# Patient Record
Sex: FEMALE | Race: WHITE | NOT HISPANIC OR LATINO
[De-identification: names, ages, dates, MRNs, and addresses within clinical notes are randomized per-mention and may not be internally consistent; named-entity substitution may affect disease eponyms.]

---

## 2017-04-12 ENCOUNTER — MEDICATION RENEWAL (OUTPATIENT)
Age: 82
End: 2017-04-12

## 2017-04-27 ENCOUNTER — APPOINTMENT (OUTPATIENT)
Dept: INTERNAL MEDICINE | Facility: CLINIC | Age: 82
End: 2017-04-27

## 2017-04-28 ENCOUNTER — INPATIENT (INPATIENT)
Facility: HOSPITAL | Age: 82
LOS: 2 days | Discharge: HOME | DRG: 690 | End: 2017-05-01
Attending: FAMILY MEDICINE | Admitting: HOSPITALIST
Payer: MEDICARE

## 2017-04-28 ENCOUNTER — APPOINTMENT (OUTPATIENT)
Dept: INTERNAL MEDICINE | Facility: CLINIC | Age: 82
End: 2017-04-28

## 2017-04-28 VITALS
OXYGEN SATURATION: 100 % | DIASTOLIC BLOOD PRESSURE: 68 MMHG | RESPIRATION RATE: 16 BRPM | WEIGHT: 119.93 LBS | TEMPERATURE: 97 F | SYSTOLIC BLOOD PRESSURE: 120 MMHG | HEART RATE: 88 BPM

## 2017-04-28 LAB
ALBUMIN SERPL ELPH-MCNC: 3.4 G/DL — SIGNIFICANT CHANGE UP (ref 3.3–5)
ALP SERPL-CCNC: 94 U/L — SIGNIFICANT CHANGE UP (ref 40–120)
ALT FLD-CCNC: 17 U/L — SIGNIFICANT CHANGE UP (ref 12–78)
AMYLASE P1 CFR SERPL: 71 U/L — SIGNIFICANT CHANGE UP (ref 25–115)
ANION GAP SERPL CALC-SCNC: 11 MMOL/L — SIGNIFICANT CHANGE UP (ref 5–17)
APPEARANCE UR: ABNORMAL
AST SERPL-CCNC: 19 U/L — SIGNIFICANT CHANGE UP (ref 15–37)
BASOPHILS # BLD AUTO: 0.1 K/UL — SIGNIFICANT CHANGE UP (ref 0–0.2)
BASOPHILS NFR BLD AUTO: 0.9 % — SIGNIFICANT CHANGE UP (ref 0–2)
BILIRUB SERPL-MCNC: 0.6 MG/DL — SIGNIFICANT CHANGE UP (ref 0.2–1.2)
BILIRUB UR-MCNC: ABNORMAL
BUN SERPL-MCNC: 44 MG/DL — HIGH (ref 7–23)
CALCIUM SERPL-MCNC: 9.3 MG/DL — SIGNIFICANT CHANGE UP (ref 8.5–10.1)
CHLORIDE SERPL-SCNC: 100 MMOL/L — SIGNIFICANT CHANGE UP (ref 96–108)
CO2 SERPL-SCNC: 23 MMOL/L — SIGNIFICANT CHANGE UP (ref 22–31)
COLOR SPEC: YELLOW — SIGNIFICANT CHANGE UP
CREAT SERPL-MCNC: 2.6 MG/DL — HIGH (ref 0.5–1.3)
DIFF PNL FLD: ABNORMAL
EOSINOPHIL # BLD AUTO: 0 K/UL — SIGNIFICANT CHANGE UP (ref 0–0.5)
EOSINOPHIL NFR BLD AUTO: 0.3 % — SIGNIFICANT CHANGE UP (ref 0–6)
GLUCOSE SERPL-MCNC: 117 MG/DL — HIGH (ref 70–99)
GLUCOSE UR QL: NEGATIVE — SIGNIFICANT CHANGE UP
HCT VFR BLD CALC: 45.7 % — HIGH (ref 34.5–45)
HGB BLD-MCNC: 14.8 G/DL — SIGNIFICANT CHANGE UP (ref 11.5–15.5)
KETONES UR-MCNC: ABNORMAL
LEUKOCYTE ESTERASE UR-ACNC: ABNORMAL
LIDOCAIN IGE QN: 220 U/L — SIGNIFICANT CHANGE UP (ref 73–393)
LYMPHOCYTES # BLD AUTO: 2.1 K/UL — SIGNIFICANT CHANGE UP (ref 1–3.3)
LYMPHOCYTES # BLD AUTO: 28.4 % — SIGNIFICANT CHANGE UP (ref 13–44)
MCHC RBC-ENTMCNC: 31.5 PG — SIGNIFICANT CHANGE UP (ref 27–34)
MCHC RBC-ENTMCNC: 32.4 GM/DL — SIGNIFICANT CHANGE UP (ref 32–36)
MCV RBC AUTO: 97.5 FL — SIGNIFICANT CHANGE UP (ref 80–100)
MONOCYTES # BLD AUTO: 0.6 K/UL — SIGNIFICANT CHANGE UP (ref 0–0.9)
MONOCYTES NFR BLD AUTO: 8.4 % — SIGNIFICANT CHANGE UP (ref 1–9)
NEUTROPHILS # BLD AUTO: 4.6 K/UL — SIGNIFICANT CHANGE UP (ref 1.8–7.4)
NEUTROPHILS NFR BLD AUTO: 62 % — SIGNIFICANT CHANGE UP (ref 43–77)
NITRITE UR-MCNC: NEGATIVE — SIGNIFICANT CHANGE UP
PH UR: 6.5 — SIGNIFICANT CHANGE UP (ref 5–8)
PLATELET # BLD AUTO: 197 K/UL — SIGNIFICANT CHANGE UP (ref 150–400)
POTASSIUM SERPL-MCNC: 5.8 MMOL/L — HIGH (ref 3.5–5.3)
POTASSIUM SERPL-SCNC: 5.8 MMOL/L — HIGH (ref 3.5–5.3)
PROT SERPL-MCNC: 6.8 G/DL — SIGNIFICANT CHANGE UP (ref 6–8.3)
PROT UR-MCNC: 25 MG/DL
RBC # BLD: 4.69 M/UL — SIGNIFICANT CHANGE UP (ref 3.8–5.2)
RBC # FLD: 12.7 % — SIGNIFICANT CHANGE UP (ref 10.3–14.5)
SODIUM SERPL-SCNC: 134 MMOL/L — LOW (ref 135–145)
SP GR SPEC: 1.01 — SIGNIFICANT CHANGE UP (ref 1.01–1.02)
TSH SERPL-MCNC: 2.22 UIU/ML — SIGNIFICANT CHANGE UP (ref 0.36–3.74)
UROBILINOGEN FLD QL: NEGATIVE — SIGNIFICANT CHANGE UP
WBC # BLD: 7.5 K/UL — SIGNIFICANT CHANGE UP (ref 3.8–10.5)
WBC # FLD AUTO: 7.5 K/UL — SIGNIFICANT CHANGE UP (ref 3.8–10.5)

## 2017-04-28 PROCEDURE — 72125 CT NECK SPINE W/O DYE: CPT | Mod: 26

## 2017-04-28 PROCEDURE — 70450 CT HEAD/BRAIN W/O DYE: CPT | Mod: 26

## 2017-04-28 PROCEDURE — 72131 CT LUMBAR SPINE W/O DYE: CPT | Mod: 26

## 2017-04-28 PROCEDURE — 72128 CT CHEST SPINE W/O DYE: CPT | Mod: 26

## 2017-04-28 RX ORDER — SODIUM CHLORIDE 9 MG/ML
3 INJECTION INTRAMUSCULAR; INTRAVENOUS; SUBCUTANEOUS ONCE
Qty: 0 | Refills: 0 | Status: COMPLETED | OUTPATIENT
Start: 2017-04-28 | End: 2017-04-28

## 2017-04-28 RX ORDER — SODIUM CHLORIDE 9 MG/ML
1000 INJECTION INTRAMUSCULAR; INTRAVENOUS; SUBCUTANEOUS ONCE
Qty: 0 | Refills: 0 | Status: COMPLETED | OUTPATIENT
Start: 2017-04-28 | End: 2017-04-28

## 2017-04-28 RX ORDER — CEFTRIAXONE 500 MG/1
1 INJECTION, POWDER, FOR SOLUTION INTRAMUSCULAR; INTRAVENOUS ONCE
Qty: 0 | Refills: 0 | Status: COMPLETED | OUTPATIENT
Start: 2017-04-28 | End: 2017-04-28

## 2017-04-28 RX ADMIN — SODIUM CHLORIDE 1000 MILLILITER(S): 9 INJECTION INTRAMUSCULAR; INTRAVENOUS; SUBCUTANEOUS at 23:14

## 2017-04-28 RX ADMIN — SODIUM CHLORIDE 3 MILLILITER(S): 9 INJECTION INTRAMUSCULAR; INTRAVENOUS; SUBCUTANEOUS at 21:08

## 2017-04-28 NOTE — ED ADULT NURSE NOTE - OBJECTIVE STATEMENT
Pt alert, Disoriented, labs drawn and sent, brought to ED by family for ams, advised on plan of care/hand hygiene, verbalized understanding, awaiting dispo.

## 2017-04-28 NOTE — ED ADULT TRIAGE NOTE - CHIEF COMPLAINT QUOTE
pt fell one week ago and c/o lower back pain  pt from PMD's office MD requests CT to r/o bleed, blood work to check Na+. and was hypotensive at office 90/60 wants a CBC pt fell one week ago and c/o lower back pain  pt from PMD's office MD requests CT to r/o bleed, blood work to check Na+. and was hypotensive at office 90/60 wants a CBC pt able to ambulate without difficulty, no contusions noted, pt normotensive in triage

## 2017-04-28 NOTE — ED ADULT NURSE NOTE - PMH
Arthritis    Damian esophagus    GERD (gastroesophageal reflux disease)    HTN - Hypertension    Hypercholesterolemia    Low sodium levels    TIA (transient ischemic attack)

## 2017-04-28 NOTE — ED ADULT NURSE NOTE - CHIEF COMPLAINT QUOTE
pt fell one week ago and c/o upper mid  back pain  pt from PMD's office MD requests CT to r/o bleed, blood work to check Na+. and was hypotensive at office 90/60 wants a CBC

## 2017-04-29 VITALS
OXYGEN SATURATION: 94 % | HEART RATE: 84 BPM | RESPIRATION RATE: 14 BRPM | DIASTOLIC BLOOD PRESSURE: 60 MMHG | SYSTOLIC BLOOD PRESSURE: 90 MMHG

## 2017-04-29 DIAGNOSIS — N39.0 URINARY TRACT INFECTION, SITE NOT SPECIFIED: ICD-10-CM

## 2017-04-29 DIAGNOSIS — R41.82 ALTERED MENTAL STATUS, UNSPECIFIED: ICD-10-CM

## 2017-04-29 DIAGNOSIS — I10 ESSENTIAL (PRIMARY) HYPERTENSION: ICD-10-CM

## 2017-04-29 DIAGNOSIS — Z29.9 ENCOUNTER FOR PROPHYLACTIC MEASURES, UNSPECIFIED: ICD-10-CM

## 2017-04-29 DIAGNOSIS — E03.9 HYPOTHYROIDISM, UNSPECIFIED: ICD-10-CM

## 2017-04-29 DIAGNOSIS — G45.9 TRANSIENT CEREBRAL ISCHEMIC ATTACK, UNSPECIFIED: ICD-10-CM

## 2017-04-29 DIAGNOSIS — N28.9 DISORDER OF KIDNEY AND URETER, UNSPECIFIED: ICD-10-CM

## 2017-04-29 LAB
ALBUMIN SERPL ELPH-MCNC: 3 G/DL — LOW (ref 3.3–5)
ALP SERPL-CCNC: 85 U/L — SIGNIFICANT CHANGE UP (ref 40–120)
ALT FLD-CCNC: 15 U/L — SIGNIFICANT CHANGE UP (ref 12–78)
ANION GAP SERPL CALC-SCNC: 9 MMOL/L — SIGNIFICANT CHANGE UP (ref 5–17)
AST SERPL-CCNC: 15 U/L — SIGNIFICANT CHANGE UP (ref 15–37)
BILIRUB SERPL-MCNC: 0.5 MG/DL — SIGNIFICANT CHANGE UP (ref 0.2–1.2)
BUN SERPL-MCNC: 41 MG/DL — HIGH (ref 7–23)
CALCIUM SERPL-MCNC: 8.6 MG/DL — SIGNIFICANT CHANGE UP (ref 8.5–10.1)
CHLORIDE SERPL-SCNC: 104 MMOL/L — SIGNIFICANT CHANGE UP (ref 96–108)
CO2 SERPL-SCNC: 25 MMOL/L — SIGNIFICANT CHANGE UP (ref 22–31)
CREAT SERPL-MCNC: 2.2 MG/DL — HIGH (ref 0.5–1.3)
GLUCOSE SERPL-MCNC: 121 MG/DL — HIGH (ref 70–99)
POTASSIUM SERPL-MCNC: 4.8 MMOL/L — SIGNIFICANT CHANGE UP (ref 3.5–5.3)
POTASSIUM SERPL-SCNC: 4.8 MMOL/L — SIGNIFICANT CHANGE UP (ref 3.5–5.3)
PROT SERPL-MCNC: 6.1 G/DL — SIGNIFICANT CHANGE UP (ref 6–8.3)
SODIUM SERPL-SCNC: 138 MMOL/L — SIGNIFICANT CHANGE UP (ref 135–145)

## 2017-04-29 PROCEDURE — 71010: CPT | Mod: 26

## 2017-04-29 PROCEDURE — 76775 US EXAM ABDO BACK WALL LIM: CPT | Mod: 26

## 2017-04-29 PROCEDURE — 93010 ELECTROCARDIOGRAM REPORT: CPT

## 2017-04-29 PROCEDURE — 99223 1ST HOSP IP/OBS HIGH 75: CPT | Mod: AI,GC

## 2017-04-29 PROCEDURE — 99285 EMERGENCY DEPT VISIT HI MDM: CPT

## 2017-04-29 RX ORDER — SODIUM CHLORIDE 9 MG/ML
1000 INJECTION INTRAMUSCULAR; INTRAVENOUS; SUBCUTANEOUS
Qty: 0 | Refills: 0 | Status: DISCONTINUED | OUTPATIENT
Start: 2017-04-29 | End: 2017-04-30

## 2017-04-29 RX ORDER — METOPROLOL TARTRATE 50 MG
12.5 TABLET ORAL DAILY
Qty: 0 | Refills: 0 | Status: DISCONTINUED | OUTPATIENT
Start: 2017-04-29 | End: 2017-05-01

## 2017-04-29 RX ORDER — FUROSEMIDE 40 MG
0 TABLET ORAL
Qty: 0 | Refills: 0 | COMMUNITY

## 2017-04-29 RX ORDER — LEVOTHYROXINE SODIUM 125 MCG
75 TABLET ORAL DAILY
Qty: 0 | Refills: 0 | Status: DISCONTINUED | OUTPATIENT
Start: 2017-04-29 | End: 2017-05-01

## 2017-04-29 RX ORDER — PANTOPRAZOLE SODIUM 20 MG/1
40 TABLET, DELAYED RELEASE ORAL DAILY
Qty: 0 | Refills: 0 | Status: DISCONTINUED | OUTPATIENT
Start: 2017-04-29 | End: 2017-05-01

## 2017-04-29 RX ORDER — LOSARTAN POTASSIUM 100 MG/1
0 TABLET, FILM COATED ORAL
Qty: 0 | Refills: 0 | COMMUNITY

## 2017-04-29 RX ORDER — EZETIMIBE 10 MG/1
0 TABLET ORAL
Qty: 0 | Refills: 0 | COMMUNITY

## 2017-04-29 RX ORDER — ACETAMINOPHEN 500 MG
650 TABLET ORAL EVERY 6 HOURS
Qty: 0 | Refills: 0 | Status: DISCONTINUED | OUTPATIENT
Start: 2017-04-29 | End: 2017-05-01

## 2017-04-29 RX ORDER — HEPARIN SODIUM 5000 [USP'U]/ML
5000 INJECTION INTRAVENOUS; SUBCUTANEOUS EVERY 12 HOURS
Qty: 0 | Refills: 0 | Status: DISCONTINUED | OUTPATIENT
Start: 2017-04-29 | End: 2017-05-01

## 2017-04-29 RX ORDER — LEVOTHYROXINE SODIUM 125 MCG
0 TABLET ORAL
Qty: 0 | Refills: 0 | COMMUNITY

## 2017-04-29 RX ORDER — CEFTRIAXONE 500 MG/1
1 INJECTION, POWDER, FOR SOLUTION INTRAMUSCULAR; INTRAVENOUS EVERY 24 HOURS
Qty: 0 | Refills: 0 | Status: DISCONTINUED | OUTPATIENT
Start: 2017-04-29 | End: 2017-04-30

## 2017-04-29 RX ORDER — SODIUM CHLORIDE 9 MG/ML
1000 INJECTION INTRAMUSCULAR; INTRAVENOUS; SUBCUTANEOUS
Qty: 0 | Refills: 0 | Status: DISCONTINUED | OUTPATIENT
Start: 2017-04-29 | End: 2017-04-29

## 2017-04-29 RX ADMIN — PANTOPRAZOLE SODIUM 40 MILLIGRAM(S): 20 TABLET, DELAYED RELEASE ORAL at 22:47

## 2017-04-29 RX ADMIN — HEPARIN SODIUM 5000 UNIT(S): 5000 INJECTION INTRAVENOUS; SUBCUTANEOUS at 18:23

## 2017-04-29 RX ADMIN — CEFTRIAXONE 100 GRAM(S): 500 INJECTION, POWDER, FOR SOLUTION INTRAMUSCULAR; INTRAVENOUS at 01:21

## 2017-04-29 RX ADMIN — SODIUM CHLORIDE 75 MILLILITER(S): 9 INJECTION INTRAMUSCULAR; INTRAVENOUS; SUBCUTANEOUS at 07:45

## 2017-04-29 RX ADMIN — HEPARIN SODIUM 5000 UNIT(S): 5000 INJECTION INTRAVENOUS; SUBCUTANEOUS at 07:45

## 2017-04-29 NOTE — PROGRESS NOTE ADULT - PROBLEM SELECTOR PLAN 3
-SARA with Cr improving with IVF (baseline Cr 0.9)  -Renal US to no obstruction  -Consulted Nephro Dr. Carranza-to see pt in AM-f/u recs

## 2017-04-29 NOTE — H&P ADULT - PROBLEM SELECTOR PLAN 3
-SARA with Cr improving with IVF (baseline Cr 0.9)  -f/u Renal US to r/o obstruction  -Consulted Nephro Dr. Carranza-to see pt in AM-f/u recs

## 2017-04-29 NOTE — ED PROVIDER NOTE - OBJECTIVE STATEMENT
Pt is a 89 yo female who presents to the ED with a cc of low back pain and increased confusion.  Pt reports that she suffered a mechanical fall approx 1 week ago.  The events surrounding the fall are unclear.  Daughter reports that pt suffers from chronic back pain.  Just prior to the fall her PMD had placed her on Tramadol.  She is unsure if this is what lead to the fall.  Pt reports that she does remember that she fell on her stone floor and struck her back and head.  Pt cannot recall why she fell.  She did not seek medical attention at the time.  Today they followed up with her PMD for continued back pain and increasing confusion.  In the office the pt BP was noted to be 90/60 per report and she was more confused then normal.  Pt was sent to the ED for further elv.  Does have a history of hyponatremia in the past.

## 2017-04-29 NOTE — H&P ADULT - PROBLEM SELECTOR PLAN 1
-possible likley 2/2 UTI  -CT head negative  -cont IVF NS @ 75cc/hour x 12 hours-reassess after for fluid status  -fall precautions  -f/u AM labs

## 2017-04-29 NOTE — PROGRESS NOTE ADULT - SUBJECTIVE AND OBJECTIVE BOX
Patient is a 89y old  Female who presents with a chief complaint of Change in mental status/uti/renal insufficiency (2017 08:09)      HPI:  This is a 89 y/o female with PMHx HTN, HLD, TIA, GERD, Barretts esophagus, arthritis p/w AMS, confusion s/p fall 1 week ago. Pt was sent by her PCP Dr. Mendoza who noticed she was obviously confused. History limited as patient is A+Ox3 but a poor historian. History obtained from ED attending Dr. George. Pt had a fall last week and was taken off certain pain meds such as tramadol and motrin. Per pt's daughter, pt was on the floor, saw she urinated on the floor and she is not normally incontinent. Pt denies seizure history. Pt denies LOC, HA, CP, SOB, abd pain or ext numb/tingling. Pt denies fevers/chills. Pt admits to dark urine. Pt denies hematuria/dysuria. Pt baseline Cr 0.9 per daughter , pt alverto sec to multi drug induce / dehydration , was on Lasix and  losartan in home.        03:05)      INTERVAL HPI/OVERNIGHT EVENTS:  T(C): 36.2, Max: 37.1 (- @ 00:16)  HR: 86 (77 - 88)  BP: 156/80 (120/68 - 156/80)  RR: 16 (16 - 16)  SpO2: 97% (97% - 100%)  Wt(kg): --  I&O's Summary    I & Os for current day (as of 2017 13:20)  =============================================  IN: 690 ml / OUT: 0 ml / NET: 690 ml      REVIEW OF SYSTEMS:  CONSTITUTIONAL: weakness   EYES: No eye pain,   ENMT:  difficult  hearing present ,   NECK: No pain or stiffness  RESPIRATORY: No cough, wheezing, chills or hemoptysis; No shortness of breath  CARDIOVASCULAR: No chest pain, palpitations, dizziness, or leg swelling  GASTROINTESTINAL: No abdominal or epigastric pain.  GENITOURINARY: No dysuria, frequency, hematuria,   NEUROLOGICAL: No headaches,   SKIN: No itching, burning, rashes, or lesions   LYMPH NODES: No enlarged glands  MUSCULOSKELETAL: back pain       PHYSICAL EXAM:  GENERAL: NAD, weakness present   HEAD:  Atraumatic, Normocephalic  EYES: EOMI, PERRLA,   ENMT:; Moist mucous membranes present  NECK: Supple,   NERVOUS SYSTEM:  Alert & Oriented X2 ,   CHEST/LUNG: Clear to percussion bilaterally; No rales, rhonchi,  HEART: Regular rate and rhythm , no tachy ;   ABDOMEN: Soft, Nontender, Nondistended; Bowel sounds present  EXTREMITIES:  2+ Peripheral Pulses, no tenderness   SKIN: No rashes or lesions    MEDICATIONS  (STANDING):  heparin  Injectable 5000Unit(s) SubCutaneous every 12 hours  sodium chloride 0.9%. 1000milliLiter(s) IV Continuous <Continuous>  cefTRIAXone   IVPB 1Gram(s) IV Intermittent every 24 hours    MEDICATIONS  (PRN):  acetaminophen   Tablet. 650milliGRAM(s) Oral every 6 hours PRN Mild Pain (1 - 3)      LABS:                        14.8   7.5   )-----------( 197      ( 2017 21:28 )             45.7         138  |  104  |  41<H>  ----------------------------<  121<H>  4.8   |  25  |  2.20<H>    Ca    8.6      2017 02:27    TPro  6.1  /  Alb  3.0<L>  /  TBili  0.5  /  DBili  x   /  AST  15  /  ALT  15  /  AlkPhos  85        Urinalysis Basic - ( 2017 23:39 )    Color: Yellow / Appearance: Slightly Turbid / S.010 / pH: x  Gluc: x / Ketone: Trace  / Bili: Small / Urobili: Negative   Blood: x / Protein: 25 mg/dL / Nitrite: Negative   Leuk Esterase: Moderate / RBC: 0-2 /HPF / WBC >50   Sq Epi: x / Non Sq Epi: Few / Bacteria: TNTC              RADIOLOGY & ADDITIONAL TESTS:    Imaging Personally Reviewed: xray chest no infiltrate , renal sono no hydronephrosis .      Advance Directives: full code

## 2017-04-29 NOTE — PROGRESS NOTE ADULT - ASSESSMENT
This is a 89 y/o female with PMHx HTN, HLD, TIA, GERD, Barretts esophagus, arthritis p/w AMS, confusion s/p fall 1 week ago a/w AMS, UTI and SARA.

## 2017-04-29 NOTE — H&P ADULT - NSHPLABSRESULTS_GEN_ALL_CORE
14.8   7.5   )-----------( 197      ( 2017 21:28 )             45.7     2017 02:27    138    |  104    |  41     ----------------------------<  121    4.8     |  25     |  2.20     Ca    8.6        2017 02:27    TPro  6.1    /  Alb  3.0    /  TBili  0.5    /  DBili  x      /  AST  15     /  ALT  15     /  AlkPhos  85     2017 02:27    LIVER FUNCTIONS - ( 2017 02:27 )  Alb: 3.0 g/dL / Pro: 6.1 g/dL / ALK PHOS: 85 U/L / ALT: 15 U/L / AST: 15 U/L / GGT: x             CAPILLARY BLOOD GLUCOSE        Urinalysis Basic - ( 2017 23:39 )    Color: Yellow / Appearance: Slightly Turbid / S.010 / pH: x  Gluc: x / Ketone: Trace  / Bili: Small / Urobili: Negative   Blood: x / Protein: 25 mg/dL / Nitrite: Negative   Leuk Esterase: Moderate / RBC: 0-2 /HPF / WBC >50   Sq Epi: x / Non Sq Epi: Few / Bacteria: TNTC

## 2017-04-29 NOTE — H&P ADULT - ASSESSMENT
89yo F with PMHx HTN, HLD, TIA, GERD, Barretts esophagus, arthritis presented to ED This is a 89 y/o female with PMHx HTN, HLD, TIA, GERD, Barretts esophagus, arthritis p/w AMS, confusion s/p fall 1 week ago a/w AMS, UTI and SARA.

## 2017-04-29 NOTE — H&P ADULT - HISTORY OF PRESENT ILLNESS
89yo F with PMHx HTN, HLD, TIA, GERD, Barretts esophagus, arthritis presented to ED       In the ED patient received 1L NS, 1 dose rocephin. CBC, CMP, lipase, amylase, TSH, UA urine culture, renal US, CT head/c-spine, CT thoracic spine, CT lumbar spine, CXR and EKG ordered.   Significant for Cr 2.2  UA: moderate leuk esterase    Imaging:  CT Head/Cervical Spine: Impression: Head CT: Motion degraded study. No acute intracranial hemorrhage, extra axial fluid collection or displaced skull fracture, given the extent of artifact. If clinically indicated and when patient is able to cooperate, a short-term follow-up or an MRI may be obtained for further evaluation.     Cervical spine CT: Motion degraded study. Diffuse osteopenia without obvious fracture or traumatic malalignment in the cervical spine, given the extent of artifact. If clinically indicated, an MRI may be obtained for further evaluation. Multilevel degenerative changes of the cervical spine.  Heterogeneous thyroid gland, which can be further characterized on a nonemergent ultrasound.    CT Thoracic/Lumbar Spine: Impression: Osteopenia. Stable age-indeterminate T8 superior endplate compression fracture. No new fracture or traumatic malalignment in the thoracic or lumbar spine. If there is continued clinical suspicion for acute fracture, an MRI may be obtained for further evaluation.     CXR:     Renal US pending.    Vitals in ED:   /78 HR 77 T 98.7 RR 16 O2 Sat 100%    Patient admitted to [facility]. This is a 89 y/o female with PMHx HTN, HLD, TIA, GERD, Barretts esophagus, arthritis p/w AMS, confusion s/p fall 1 week ago. Pt was sent by her PCP Dr. Mendoza who noticed she was obviously confused. History limited as patient is A+Ox3 but a poor historian. History obtained from ED attending Dr. George. Pt had a fall last week and was taken off certain pain meds such as tramadol and motrin. Per pt's daughter, pt was on the floor, saw she urinated on the floor and she is not normally incontinent. Pt denies seizure history. Pt denies LOC, HA, CP, SOB, abd pain or ext numb/tingling. Pt denies fevers/chills. Pt admits to dark urine. Pt denies hematuria/dysuria. Pt baseline Cr 0.9 per daughter. Unable to get a medication list from patient.       In the ED, VSS. Labs significant for initial Cr of 2.6, K+ 5.8, and UA+. In the ED, patient received 1L NS, Rocephin 1g IV x1. Renal US pending. CT head negative. CT cervical, thoracic, lumbar spine negative for acute fx.    Imaging:  CT Head/Cervical Spine: Impression: Head CT: Motion degraded study. No acute intracranial hemorrhage, extra axial fluid collection or displaced skull fracture, given the extent of artifact. If clinically indicated and when patient is able to cooperate, a short-term follow-up or an MRI may be obtained for further evaluation.     Cervical spine CT: Motion degraded study. Diffuse osteopenia without obvious fracture or traumatic malalignment in the cervical spine, given the extent of artifact. If clinically indicated, an MRI may be obtained for further evaluation. Multilevel degenerative changes of the cervical spine.  Heterogeneous thyroid gland, which can be further characterized on a nonemergent ultrasound.    CT Thoracic/Lumbar Spine: Impression: Osteopenia. Stable age-indeterminate T8 superior endplate compression fracture. No new fracture or traumatic malalignment in the thoracic or lumbar spine. If there is continued clinical suspicion for acute fracture, an MRI may be obtained for further evaluation.

## 2017-04-29 NOTE — ED PROVIDER NOTE - CONDUCTED A DETAILED DISCUSSION WITH PATIENT AND/OR GUARDIAN REGARDING, MDM
return to ED if symptoms worsen, persist or questions arise/need to admit/lab results/radiology results

## 2017-04-29 NOTE — ED PROVIDER NOTE - CARE PLAN
Principal Discharge DX:	Change in mental status  Instructions for follow-up, activity and diet:	admit  Secondary Diagnosis:	UTI (urinary tract infection)  Secondary Diagnosis:	Renal insufficiency

## 2017-04-29 NOTE — ED PROVIDER NOTE - MEDICAL DECISION MAKING DETAILS
cbc, cmp, amylase, lipase, TSH, UA, urine culture, EKG, chest x-ray, CT head, cervical spine, thoracic spine, and lumbar spine, observation

## 2017-04-30 DIAGNOSIS — I10 ESSENTIAL (PRIMARY) HYPERTENSION: ICD-10-CM

## 2017-04-30 DIAGNOSIS — N17.9 ACUTE KIDNEY FAILURE, UNSPECIFIED: ICD-10-CM

## 2017-04-30 DIAGNOSIS — Z71.89 OTHER SPECIFIED COUNSELING: ICD-10-CM

## 2017-04-30 DIAGNOSIS — N39.0 URINARY TRACT INFECTION, SITE NOT SPECIFIED: ICD-10-CM

## 2017-04-30 LAB
ANION GAP SERPL CALC-SCNC: 9 MMOL/L — SIGNIFICANT CHANGE UP (ref 5–17)
BASOPHILS # BLD AUTO: 0.1 K/UL — SIGNIFICANT CHANGE UP (ref 0–0.2)
BASOPHILS NFR BLD AUTO: 1.4 % — SIGNIFICANT CHANGE UP (ref 0–2)
BUN SERPL-MCNC: 26 MG/DL — HIGH (ref 7–23)
CALCIUM SERPL-MCNC: 9.2 MG/DL — SIGNIFICANT CHANGE UP (ref 8.5–10.1)
CHLORIDE SERPL-SCNC: 101 MMOL/L — SIGNIFICANT CHANGE UP (ref 96–108)
CO2 SERPL-SCNC: 24 MMOL/L — SIGNIFICANT CHANGE UP (ref 22–31)
CREAT SERPL-MCNC: 1.5 MG/DL — HIGH (ref 0.5–1.3)
EOSINOPHIL # BLD AUTO: 0.1 K/UL — SIGNIFICANT CHANGE UP (ref 0–0.5)
EOSINOPHIL NFR BLD AUTO: 1.1 % — SIGNIFICANT CHANGE UP (ref 0–6)
GLUCOSE SERPL-MCNC: 111 MG/DL — HIGH (ref 70–99)
HCT VFR BLD CALC: 47 % — HIGH (ref 34.5–45)
HGB BLD-MCNC: 14.7 G/DL — SIGNIFICANT CHANGE UP (ref 11.5–15.5)
LYMPHOCYTES # BLD AUTO: 1.6 K/UL — SIGNIFICANT CHANGE UP (ref 1–3.3)
LYMPHOCYTES # BLD AUTO: 28.3 % — SIGNIFICANT CHANGE UP (ref 13–44)
MCHC RBC-ENTMCNC: 30.4 PG — SIGNIFICANT CHANGE UP (ref 27–34)
MCHC RBC-ENTMCNC: 31.3 GM/DL — LOW (ref 32–36)
MCV RBC AUTO: 97 FL — SIGNIFICANT CHANGE UP (ref 80–100)
MONOCYTES # BLD AUTO: 0.5 K/UL — SIGNIFICANT CHANGE UP (ref 0–0.9)
MONOCYTES NFR BLD AUTO: 8.2 % — SIGNIFICANT CHANGE UP (ref 1–9)
NEUTROPHILS # BLD AUTO: 3.5 K/UL — SIGNIFICANT CHANGE UP (ref 1.8–7.4)
NEUTROPHILS NFR BLD AUTO: 61 % — SIGNIFICANT CHANGE UP (ref 43–77)
PLATELET # BLD AUTO: 197 K/UL — SIGNIFICANT CHANGE UP (ref 150–400)
POTASSIUM SERPL-MCNC: 5.1 MMOL/L — SIGNIFICANT CHANGE UP (ref 3.5–5.3)
POTASSIUM SERPL-SCNC: 5.1 MMOL/L — SIGNIFICANT CHANGE UP (ref 3.5–5.3)
RBC # BLD: 4.84 M/UL — SIGNIFICANT CHANGE UP (ref 3.8–5.2)
RBC # FLD: 12.3 % — SIGNIFICANT CHANGE UP (ref 10.3–14.5)
SODIUM SERPL-SCNC: 134 MMOL/L — LOW (ref 135–145)
WBC # BLD: 5.8 K/UL — SIGNIFICANT CHANGE UP (ref 3.8–10.5)
WBC # FLD AUTO: 5.8 K/UL — SIGNIFICANT CHANGE UP (ref 3.8–10.5)

## 2017-04-30 PROCEDURE — 99233 SBSQ HOSP IP/OBS HIGH 50: CPT | Mod: GC

## 2017-04-30 RX ORDER — CIPROFLOXACIN LACTATE 400MG/40ML
500 VIAL (ML) INTRAVENOUS ONCE
Qty: 0 | Refills: 0 | Status: COMPLETED | OUTPATIENT
Start: 2017-04-30 | End: 2017-04-30

## 2017-04-30 RX ADMIN — CEFTRIAXONE 100 GRAM(S): 500 INJECTION, POWDER, FOR SOLUTION INTRAMUSCULAR; INTRAVENOUS at 00:44

## 2017-04-30 RX ADMIN — Medication 75 MICROGRAM(S): at 05:29

## 2017-04-30 RX ADMIN — Medication 500 MILLIGRAM(S): at 22:59

## 2017-04-30 RX ADMIN — HEPARIN SODIUM 5000 UNIT(S): 5000 INJECTION INTRAVENOUS; SUBCUTANEOUS at 05:29

## 2017-04-30 RX ADMIN — Medication 12.5 MILLIGRAM(S): at 05:29

## 2017-04-30 RX ADMIN — SODIUM CHLORIDE 65 MILLILITER(S): 9 INJECTION INTRAMUSCULAR; INTRAVENOUS; SUBCUTANEOUS at 00:44

## 2017-04-30 RX ADMIN — PANTOPRAZOLE SODIUM 40 MILLIGRAM(S): 20 TABLET, DELAYED RELEASE ORAL at 12:51

## 2017-04-30 RX ADMIN — HEPARIN SODIUM 5000 UNIT(S): 5000 INJECTION INTRAVENOUS; SUBCUTANEOUS at 17:47

## 2017-04-30 NOTE — PROGRESS NOTE ADULT - ASSESSMENT
This is a 89 y/o female with PMHx HTN, HLD, TIA, GERD, Barretts esophagus, arthritis p/w AMS, confusion s/p fall 1 week ago a/w AMS, UTI and SARA / dehydration  and meds induce .

## 2017-04-30 NOTE — PROGRESS NOTE ADULT - SUBJECTIVE AND OBJECTIVE BOX
cc/o weakness     HPI:  This is a 89 y/o female with PMHx HTN, HLD, TIA, GERD, Barretts esophagus, arthritis p/w AMS, confusion s/p fall 1 week ago. Pt was sent by her PCP Dr. Mendoza who noticed she was obviously confused. . . Pt admits dehydrated / alverto   meds induce and sec low po intake ,  with  uti cause of ams possible this time .     INTERVAL HPI/OVERNIGHT EVENTS:  T(C): 36.9, Max: 36.9 (04-30 @ 14:17)  HR: 66 (66 - 79)  BP: 165/80 (160/80 - 187/93)  RR: 17 (16 - 17)  SpO2: 99% (95% - 99%)  Wt(kg): --  I&O's Summary  I & Os for 24h ending 2017 07:00  =============================================  IN: 1980 ml / OUT: 0 ml / NET: 1980 ml    I & Os for current day (as of 2017 15:06)  =============================================  IN: 240 ml / OUT: 0 ml / NET: 240 ml      REVIEW OF SYSTEMS:  CONSTITUTIONAL: No fever, present  fatigue  EYES: No eye pain,   ENMT:  No difficulty hearing,  NECK: No pain or stiffness  BREASTS: No pain,   RESPIRATORY: No cough, wheezing, chills or hemoptysis; No shortness of breath  CARDIOVASCULAR: No chest pain, palpitations, dizziness, or leg swelling  GASTROINTESTINAL: No abdominal or epigastric pain. No nausea, vomiting  GENITOURINARY: No dysuria, frequency, hematuria, or incontinence  NEUROLOGICAL: No headaches,   SKIN: No itching, burning, rashes, or lesions   LYMPH NODES: No enlarged glands  MUSCULOSKELETAL: No joint pain or swelling; No muscle, back, or extremity pain      PHYSICAL EXAM:  GENERAL: NAD, weakness   HEAD:  Atraumatic, Normocephalic  EYES: EOMI, PERRLA,  ENMT:  Moist mucous membranes,   NECK: Supple, No JVD,   NERVOUS SYSTEM:  Alert & Oriented X2 , unsteady gait   CHEST/LUNG: Clear to percussion bilaterally; No rales, rhonchi, wheezing, or rubs  HEART: Regular rate and rhythm; No murmurs,  ABDOMEN: Soft, Nontender, Nondistended; Bowel sounds present  EXTREMITIES:  2+ Peripheral Pulses, No clubbing, cyanosis, or edema  gu intact     MEDICATIONS  (STANDING):  heparin  Injectable 5000Unit(s) SubCutaneous every 12 hours  cefTRIAXone   IVPB 1Gram(s) IV Intermittent every 24 hours  levothyroxine 75MICROGram(s) Oral daily  metoprolol 12.5milliGRAM(s) Oral daily  sodium chloride 0.9%. 1000milliLiter(s) IV Continuous <Continuous>  pantoprazole    Tablet 40milliGRAM(s) Oral daily    MEDICATIONS  (PRN):  acetaminophen   Tablet. 650milliGRAM(s) Oral every 6 hours PRN Mild Pain (1 - 3)      LABS:                        14.8   7.5   )-----------( 197      ( 2017 21:28 )             45.7         138  |  104  |  41<H>  ----------------------------<  121<H>  4.8   |  25  |  2.20<H>    Ca    8.6      2017 02:27    TPro  6.1  /  Alb  3.0<L>  /  TBili  0.5  /  DBili  x   /  AST  15  /  ALT  15  /  AlkPhos  85        Urinalysis Basic - ( 2017 23:39 )    Color: Yellow / Appearance: Slightly Turbid / S.010 / pH: x  Gluc: x / Ketone: Trace  / Bili: Small / Urobili: Negative   Blood: x / Protein: 25 mg/dL / Nitrite: Negative   Leuk Esterase: Moderate / RBC: 0-2 /HPF / WBC >50   Sq Epi: x / Non Sq Epi: Few / Bacteria: TNTC      CAPILLARY BLOOD GLUCOSE       @ 10:27   >100,000 CFU/ml Escherichia coli  --  --          RADIOLOGY & ADDITIONAL TESTS:    Imaging Personally Reviewed:  no new one       Advance Directives: full code       Palliative Care: pending consult

## 2017-05-01 ENCOUNTER — TRANSCRIPTION ENCOUNTER (OUTPATIENT)
Age: 82
End: 2017-05-01

## 2017-05-01 VITALS
DIASTOLIC BLOOD PRESSURE: 63 MMHG | RESPIRATION RATE: 16 BRPM | HEART RATE: 74 BPM | SYSTOLIC BLOOD PRESSURE: 126 MMHG | OXYGEN SATURATION: 100 % | TEMPERATURE: 97 F

## 2017-05-01 PROCEDURE — 70450 CT HEAD/BRAIN W/O DYE: CPT

## 2017-05-01 PROCEDURE — 87186 SC STD MICRODIL/AGAR DIL: CPT

## 2017-05-01 PROCEDURE — 81001 URINALYSIS AUTO W/SCOPE: CPT

## 2017-05-01 PROCEDURE — 71045 X-RAY EXAM CHEST 1 VIEW: CPT

## 2017-05-01 PROCEDURE — 72128 CT CHEST SPINE W/O DYE: CPT

## 2017-05-01 PROCEDURE — 80053 COMPREHEN METABOLIC PANEL: CPT

## 2017-05-01 PROCEDURE — 84443 ASSAY THYROID STIM HORMONE: CPT

## 2017-05-01 PROCEDURE — 82150 ASSAY OF AMYLASE: CPT

## 2017-05-01 PROCEDURE — 83690 ASSAY OF LIPASE: CPT

## 2017-05-01 PROCEDURE — 97162 PT EVAL MOD COMPLEX 30 MIN: CPT

## 2017-05-01 PROCEDURE — 85027 COMPLETE CBC AUTOMATED: CPT

## 2017-05-01 PROCEDURE — 96372 THER/PROPH/DIAG INJ SC/IM: CPT | Mod: XU

## 2017-05-01 PROCEDURE — 96365 THER/PROPH/DIAG IV INF INIT: CPT

## 2017-05-01 PROCEDURE — 99239 HOSP IP/OBS DSCHRG MGMT >30: CPT

## 2017-05-01 PROCEDURE — 87086 URINE CULTURE/COLONY COUNT: CPT

## 2017-05-01 PROCEDURE — 76775 US EXAM ABDO BACK WALL LIM: CPT

## 2017-05-01 PROCEDURE — 93005 ELECTROCARDIOGRAM TRACING: CPT

## 2017-05-01 PROCEDURE — 80048 BASIC METABOLIC PNL TOTAL CA: CPT

## 2017-05-01 PROCEDURE — 72125 CT NECK SPINE W/O DYE: CPT

## 2017-05-01 PROCEDURE — 99285 EMERGENCY DEPT VISIT HI MDM: CPT | Mod: 25

## 2017-05-01 PROCEDURE — 72131 CT LUMBAR SPINE W/O DYE: CPT

## 2017-05-01 RX ORDER — METOPROLOL TARTRATE 50 MG
0.5 TABLET ORAL
Qty: 15 | Refills: 0 | OUTPATIENT
Start: 2017-05-01 | End: 2017-05-31

## 2017-05-01 RX ORDER — LOSARTAN POTASSIUM 100 MG/1
50 TABLET, FILM COATED ORAL
Qty: 0 | Refills: 0 | COMMUNITY

## 2017-05-01 RX ORDER — ACETAMINOPHEN 500 MG
2 TABLET ORAL
Qty: 0 | Refills: 0 | COMMUNITY
Start: 2017-05-01

## 2017-05-01 RX ORDER — CIPROFLOXACIN LACTATE 400MG/40ML
1 VIAL (ML) INTRAVENOUS
Qty: 5 | Refills: 0 | OUTPATIENT
Start: 2017-05-01 | End: 2017-05-06

## 2017-05-01 RX ORDER — EZETIMIBE 10 MG/1
10 TABLET ORAL
Qty: 0 | Refills: 0 | COMMUNITY

## 2017-05-01 RX ORDER — PANTOPRAZOLE SODIUM 20 MG/1
1 TABLET, DELAYED RELEASE ORAL
Qty: 0 | Refills: 0 | COMMUNITY
Start: 2017-05-01

## 2017-05-01 RX ORDER — HYDRALAZINE HCL 50 MG
25 TABLET ORAL EVERY 12 HOURS
Qty: 0 | Refills: 0 | Status: DISCONTINUED | OUTPATIENT
Start: 2017-05-01 | End: 2017-05-01

## 2017-05-01 RX ORDER — FUROSEMIDE 40 MG
40 TABLET ORAL
Qty: 0 | Refills: 0 | COMMUNITY

## 2017-05-01 RX ADMIN — HEPARIN SODIUM 5000 UNIT(S): 5000 INJECTION INTRAVENOUS; SUBCUTANEOUS at 06:14

## 2017-05-01 RX ADMIN — Medication 75 MICROGRAM(S): at 06:14

## 2017-05-01 RX ADMIN — Medication 12.5 MILLIGRAM(S): at 06:14

## 2017-05-01 RX ADMIN — PANTOPRAZOLE SODIUM 40 MILLIGRAM(S): 20 TABLET, DELAYED RELEASE ORAL at 11:09

## 2017-05-01 NOTE — PHYSICAL THERAPY INITIAL EVALUATION ADULT - CRITERIA FOR SKILLED THERAPEUTIC INTERVENTIONS
risk reduction/prevention/therapy frequency/rehab potential/predicted duration of therapy intervention/functional limitations in following categories/anticipated discharge recommendation/impairments found

## 2017-05-01 NOTE — PHYSICAL THERAPY INITIAL EVALUATION ADULT - ADDITIONAL COMMENTS
Patient lives at home alone.  Patient states her daughter lives with her but according to SW this is not true.  Patient has multiple stairs to negotiate within the home with bilateral rails.  Patient has a rolling walker if needed.

## 2017-05-01 NOTE — PROGRESS NOTE ADULT - PROBLEM SELECTOR PLAN 2
High BP. Add hydralazine 25mg bid. Monitor BP trend. Titrate BP meds as needed. Salt restriction. Avoid ARB / ACEI for now. Hold discharge until BP better controlled.

## 2017-05-01 NOTE — DISCHARGE NOTE ADULT - PLAN OF CARE
resolving continue cipro 250 daily for 5 more days hold arb and lasix  start metoprolol 12.5 BID Start metoprolol 12.5 bid stable continue home regimen sec to uti resolved resolving/ ecoli resolved hold arb and lasix  start metoprolol 12.5 mg daily Start metoprolol 12.5 mg daily/ fu bp pmd office

## 2017-05-01 NOTE — PROGRESS NOTE ADULT - ATTENDING COMMENTS
see above plan and management /   uti ecoli cont rocephin cause of ams resolving , alverto sec to meds induce was on home Losartan and lasix  and dehydration on gentle iv fluid  .fu repeat electrolyte. t8 compression fracture age indeterminata  sec to osteopenia hx fall 1 wk ago will order pt evaluation / pt hold on zetia sec to low appetite .palliative care consult for advance age.
see above plan and care agree with it/ dc home seen by pt evaluation no need rehab / on po abx . pt high risk of fall pt will need continuos home supervision.
see above plan and management / alverto sec to meds induce was on home Losartan and lasix  and dehydration on gentle iv fluid  .fu repeat electrolyte. t8 compression fracture age indeterminata  sec to osteopenia hx fall 1 wk ago will order pt evaluation / pt hold on zetia sec to low appetite .palliative care consult for advance age.

## 2017-05-01 NOTE — DISCHARGE NOTE ADULT - CARE PLAN
Principal Discharge DX:	UTI (urinary tract infection)  Secondary Diagnosis:	Renal insufficiency  Secondary Diagnosis:	Hypertension  Secondary Diagnosis:	Hypothyroid  Secondary Diagnosis:	Change in mental status Principal Discharge DX:	UTI (urinary tract infection)  Goal:	resolving  Instructions for follow-up, activity and diet:	continue cipro 250 daily for 5 more days  Secondary Diagnosis:	Renal insufficiency  Goal:	resolving  Instructions for follow-up, activity and diet:	hold arb and lasix  start metoprolol 12.5 BID  Secondary Diagnosis:	Hypertension  Instructions for follow-up, activity and diet:	Start metoprolol 12.5 bid  Secondary Diagnosis:	Hypothyroid  Goal:	stable  Instructions for follow-up, activity and diet:	continue home regimen  Secondary Diagnosis:	Change in mental status  Instructions for follow-up, activity and diet:	continue home regimen Principal Discharge DX:	UTI (urinary tract infection)  Goal:	resolving/ ecoli  Instructions for follow-up, activity and diet:	continue cipro 250 daily for 5 more days  Secondary Diagnosis:	Renal insufficiency  Goal:	resolved  Instructions for follow-up, activity and diet:	hold arb and lasix  start metoprolol 12.5 mg daily  Secondary Diagnosis:	Hypertension  Instructions for follow-up, activity and diet:	Start metoprolol 12.5 mg daily/ fu bp pmd office  Secondary Diagnosis:	Hypothyroid  Goal:	stable  Instructions for follow-up, activity and diet:	continue home regimen  Secondary Diagnosis:	Change in mental status  Goal:	sec to uti resolved  Instructions for follow-up, activity and diet:	continue home regimen

## 2017-05-01 NOTE — DISCHARGE NOTE ADULT - HOSPITAL COURSE
This is a 89 y/o female with PMHx HTN, HLD, TIA, GERD, Barretts esophagus, arthritis p/w AMS, confusion s/p fall 1 week ago. Pt was sent by her PCP Dr. Mendoza who noticed she was obviously confused. Pt admitted with dehydration and sara 2/2 to home meds (losartan and lasix) and decreased po intake as well as with UTI which is the possible cause of the AMS. Urine culture showed 100,000 CFU/ml Escherichia coli. CT head was negative. Pt recieved 1 dose of ceftriaxone 1g was X2, cipro 500 X 1, and IVF before pt refused IV access. Pt seen by physical therapy - no home pt needed. SARA improved after home meds of lasix and losartan were held and pt had increased PO intake. Nephro, Dr. Carranza, was consulted and reccomended aovided ARB/ACEI for now. Renal US to no obstruction, no hydro. Pt was started on metoprolol 12.5 BID since home meds were held 2/2 SARA. Pts asa was held 2/2 to SARA which has noqw resolved so ASA is restarted. Hypothyroid was controlled with synthroid, TSH 2.    Pt was seen and evaluated on day of discharge and pt is medically optimized to go.     More than 30 min was spent on this note. This is a 89 y/o female with PMHx HTN, HLD, TIA, GERD, Barretts esophagus, arthritis p/w AMS, confusion s/p fall 1 week ago. Pt was sent by her PCP Dr. Mendoza who noticed she was obviously confused. Pt admitted with dehydration  and sara 2/2 to home meds (losartan and lasix) and decreased po intake as well as with UTI which is the possible cause of the AMS. Urine culture showed 100,000 CFU/ml Escherichia coli. CT head was negative. Pt recieved 1 dose of ceftriaxone 1g was X2, cipro 500 X 1, and IVF before pt refused IV access. Pt seen by physical therapy - no home pt needed. SARA improved after home meds of lasix and losartan were held and pt had increased PO intake and iv hydration Nephro, Dr. Carranza, was consulted and recommended avoided ARB/ACEI for now. Renal US to no obstruction, no hydro. Pt was started on metoprolol 12.5 daily  since home meds were held 2/2 SARA. Pts asa hx tia / no zetia sec to muscle weakness and low po intake .Hypothyroid was controlled with synthroid, TSH wnl, mild hyponatremia sec to dehydration resolved. pt will need home supervision no home pt need / pt also have osteopenia cause of t8 endplate age ind ermine compression fracture cont tylenols for pain.  ams sec to uti resolved.    Pt was seen and evaluated on day of discharge and pt is medically optimized to go. pmd to fu bp if need to increase bb fu in 1wk / fu dr carranza renal 1 wk fu electrolyte and cr out pt.    More than 30 min was spent on this note. This is a 89 y/o female with PMHx HTN, HLD, TIA, GERD, Barretts esophagus, arthritis p/w AMS, confusion s/p fall 1 week ago. Pt was sent by her PCP Dr. Mendoza who noticed she was obviously confused. Pt admitted with dehydration  and sara 2/2 to home meds (losartan and lasix) and decreased po intake as well as with UTI which is the possible cause of the AMS. Urine culture showed 100,000 CFU/ml Escherichia coli. CT head was negative. Pt recieved 1 dose of ceftriaxone 1g was X2, cipro 500 X 1, and IVF before pt refused IV access. Pt seen by physical therapy - no home pt needed. SARA improved after home meds of lasix and losartan were held and pt had increased PO intake and iv hydration Nephro, Dr. Carranza, was consulted and recommended avoided ARB/ACEI for now. Renal US to no obstruction, no hydro. Pt was started on metoprolol 12.5 daily  since home meds were held 2/2 SARA. Pts asa hx tia / no zetia sec to muscle weakness and low po intake .Hypothyroid was controlled with synthroid, TSH wnl, mild hyponatremia sec to dehydration resolved. pt will need home supervision no home pt need / pt also have osteopenia cause of t8 endplate age ind ermine compression fracture cont tylenols for pain.  ams sec to uti resolved.    Pt was seen and evaluated on day of discharge and pt is medically optimized to go. pmd to fu bp if need to increase bb fu in 1wk / fu dr carranza renal 1 wk fu electrolyte and cr out pt.    More than 30 min was spent on this note. pt pmd dr garcia stroke [phone] called office for dc plan/ daughter explained pt need continuos supervision as high risk of fall with advance age / daughter refusing domenic this time.

## 2017-05-01 NOTE — CONSULT NOTE ADULT - SUBJECTIVE AND OBJECTIVE BOX
HPI:  This is a 89 y/o female with PMHx HTN, HLD, TIA, GERD, Barretts esophagus, arthritis p/w AMS, confusion s/p fall 1 week ago. Pt was sent by her PCP Dr. Mendoza who noticed she was obviously confused and with a low BP. Patient's daughter said that patient usually has accelerated HTN. Pt had a fall last week and was taken off certain pain meds such as tramadol and motrin. Per pt's daughter, pt was on the floor, saw she urinated on the floor and she is not normally incontinent. Pt denies seizure history. Pt denies LOC, HA, CP, SOB, abd pain or ext numb/tingling. Pt denies fevers/chills. Pt admits to dark urine. Pt denies hematuria/dysuria. Pt baseline Cr 0.9 per daughter. Patient was on losartan, lasix, aciphex and ASA prior to coming in to the hospital.    Daughter states that patient lives alone in a split level home. She is independent with ADLs. She walks without any assistive devices. Her memory is relatively intact except for occasionally problems with recent events.      In the ED, VSS. Labs significant for initial Cr of 2.6, K+ 5.8, and UA+. In the ED, patient received 1L NS, Rocephin 1g IV x1. CT head negative. CT cervical, thoracic, lumbar spine negative for acute fx.        PAST MEDICAL & SURGICAL HISTORY:  Low sodium levels  TIA (transient ischemic attack)  Arthritis  Hypercholesterolemia  HTN - Hypertension  GERD (gastroesophageal reflux disease)  Damian esophagus  Cataract  S/P laparoscopic cholecystectomy  H/O carotid endarterectomy  H/O: : x4       SOCIAL HISTORY:                                     Admitted from:  home         FAMILY HISTORY: n/c      MEDICATIONS  (STANDING):  heparin  Injectable 5000Unit(s) SubCutaneous every 12 hours  cefTRIAXone   IVPB 1Gram(s) IV Intermittent every 24 hours  levothyroxine 75MICROGram(s) Oral daily  metoprolol 12.5milliGRAM(s) Oral daily  sodium chloride 0.9%. 1000milliLiter(s) IV Continuous <Continuous>  pantoprazole    Tablet 40milliGRAM(s) Oral daily    MEDICATIONS  (PRN):  acetaminophen   Tablet. 650milliGRAM(s) Oral every 6 hours PRN Mild Pain (1 - 3)      Allergies    No Known Allergies    Intolerances          ADVANCE DIRECTIVES:  [ ] YES [ x] NO   DNR [ ] YES [x ] NO  Completed on:                     MOLST  [ ] YES [x ] NO   Completed on:  Living Will  [ x] YES [ ] NO   Completed on:    Surrogate/HCP/Guardian: [x ] YES [ ] NO   Mary Mcfadden (daughter)                    Phone#:      Baseline ADLs (prior to admission):  Independent [ ] moderately [x ] fully   Dependent   [ ] moderately [ ]fully    Karnofsky Performance Score/Palliative Performance Status Version 2:     Score90%   Fully ambulatory  Normal activity & work with some evidence of disease  Full self-care  Normal intake  Fully conscious      Review of Systems: Reviewed                     Negative: no h/a, n/v/d, dysuria, chills, dizziness, cp, galindo/sob                     Positive: sometimes feels unsteady while walking  All others negative    Dyspnea: 0  Nausea/Vomiting: N  Anxiety:  N  Depression: N  Fatigue: N  Loss of appetite: N    Pain: NONE            Character-            Duration-            Factors-            Frequency-            Location-            Severity-    Vital Signs Last 24 Hrs  T(C): 36.9, Max: 36.9 ( @ 14:17)  T(F): 98.4, Max: 98.4 ( @ 14:17)  HR: 66 (66 - 79)  BP: 165/80 (160/80 - 187/93)  BP(mean): --  RR: 17 (16 - 17)  SpO2: 99% (95% - 99%)      General: alert  oriented x _3___    HEENT: normal                     Lungs:             CTA B/L       CV: S1S2 RRR    GI: normal,  soft,  NT,  BS+         MSK: normal, no weakness, trace edema             ambulatory      Skin:  no rash        LABS:                        14.8   7.5   )-----------( 197      ( 2017 21:28 )             45.7         138  |  104  |  41<H>  ----------------------------<  121<H>  4.8   |  25  |  2.20<H>    Ca    8.6      2017 02:27    TPro  6.1  /  Alb  3.0<L>  /  TBili  0.5  /  DBili  x   /  AST  15  /  ALT  15  /  AlkPhos  85        Urinalysis Basic - ( 2017 23:39 )    Color: Yellow / Appearance: Slightly Turbid / S.010 / pH: x  Gluc: x / Ketone: Trace  / Bili: Small / Urobili: Negative   Blood: x / Protein: 25 mg/dL / Nitrite: Negative   Leuk Esterase: Moderate / RBC: 0-2 /HPF / WBC >50   Sq Epi: x / Non Sq Epi: Few / Bacteria: [facility]    Culture Results:   >100,000 CFU/ml Escherichia coli (04.29.17 @ 10:27)        I&O's Summary  I & Os for 24h ending 2017 07:00  =============================================  IN: 1980 ml / OUT: 0 ml / NET: 1980 ml    I & Os for current day (as of 2017 14:49)  =============================================  IN: 240 ml / OUT: 0 ml / NET: 240 ml      RADIOLOGY & ADDITIONAL STUDIES:    Imaging:  CT Head/Cervical Spine: Impression: Head CT: Motion degraded study. No acute intracranial hemorrhage, extra axial fluid collection or displaced skull fracture, given the extent of artifact. If clinically indicated and when patient is able to cooperate, a short-term follow-up or an MRI may be obtained for further evaluation.     Cervical spine CT: Motion degraded study. Diffuse osteopenia without obvious fracture or traumatic malalignment in the cervical spine, given the extent of artifact. If clinically indicated, an MRI may be obtained for further evaluation. Multilevel degenerative changes of the cervical spine.  Heterogeneous thyroid gland, which can be further characterized on a nonemergent ultrasound.    CT Thoracic/Lumbar Spine: Impression: Osteopenia. Stable age-indeterminate T8 superior endplate compression fracture. No new fracture or traumatic malalignment in the thoracic or lumbar spine. If there is continued clinical suspicion for acute fracture, an MRI may be obtained for further evaluation. (2017 03:05)    Renal u/s: no hydro
Patient is a 89y old  Female who presents with a chief complaint of Change in mental status/uti/renal insufficiency (2017 08:09)    HPI:  This is a 89 y/o female with PMHx HTN, HLD, TIA, GERD, Barretts esophagus, arthritis p/w AMS, confusion s/p fall 1 week ago. Pt was sent by her PCP Dr. Mendoza who noticed she was obviously confused. History limited as patient is A+Ox3 but a poor historian. History obtained from ED attending Dr. George. Pt had a fall last week and was taken off certain pain meds such as tramadol and motrin. Per pt's daughter, pt was on the floor, saw she urinated on the floor and she is not normally incontinent. Pt denies seizure history. Pt denies LOC, HA, CP, SOB, abd pain or ext numb/tingling. Pt denies fevers/chills. Pt admits to dark urine. Pt denies hematuria/dysuria. Pt baseline Cr 0.9 per daughter. Unable to get a medication list from patient.       In the ED, VSS. Labs significant for initial Cr of 2.6, K+ 5.8, and UA+. In the ED, patient received 1L NS, Rocephin 1g IV x1. Renal US pending. CT head negative. CT cervical, thoracic, lumbar spine negative for acute fx.    Imaging:  CT Head/Cervical Spine: Impression: Head CT: Motion degraded study. No acute intracranial hemorrhage, extra axial fluid collection or displaced skull fracture, given the extent of artifact. If clinically indicated and when patient is able to cooperate, a short-term follow-up or an MRI may be obtained for further evaluation.     Cervical spine CT: Motion degraded study. Diffuse osteopenia without obvious fracture or traumatic malalignment in the cervical spine, given the extent of artifact. If clinically indicated, an MRI may be obtained for further evaluation. Multilevel degenerative changes of the cervical spine.  Heterogeneous thyroid gland, which can be further characterized on a nonemergent ultrasound.    CT Thoracic/Lumbar Spine: Impression: Osteopenia. Stable age-indeterminate T8 superior endplate compression fracture. No new fracture or traumatic malalignment in the thoracic or lumbar spine. If there is continued clinical suspicion for acute fracture, an MRI may be obtained for further evaluation. (2017 03:05)    PAST MEDICAL & SURGICAL HISTORY:  Low sodium levels  TIA (transient ischemic attack)  Arthritis  Hypercholesterolemia  HTN - Hypertension  GERD (gastroesophageal reflux disease)  Damian esophagus  Cataract  S/P laparoscopic cholecystectomy  H/O carotid endarterectomy  H/O: : x4    FAMILY HISTORY:    No Known Allergies    MEDICATIONS  (STANDING):  heparin  Injectable 5000Unit(s) SubCutaneous every 12 hours  cefTRIAXone   IVPB 1Gram(s) IV Intermittent every 24 hours  levothyroxine 75MICROGram(s) Oral daily  metoprolol 12.5milliGRAM(s) Oral daily  sodium chloride 0.9%. 1000milliLiter(s) IV Continuous <Continuous>    MEDICATIONS  (PRN):  acetaminophen   Tablet. 650milliGRAM(s) Oral every 6 hours PRN Mild Pain (1 - 3)    Vital Signs Last 24 Hrs  T(C): 36.7, Max: 37.1 ( @ 00:16)  T(F): 98.1, Max: 98.7 ( @ 00:16)  HR: 85 (77 - 88)  BP: 156/84 (120/68 - 156/84)  BP(mean): --  RR: 16 (16 - 16)  SpO2: 98% (97% - 100%)    CAPILLARY BLOOD GLUCOSE    PHYSICAL EXAM:      T(C): 36.7, Max: 37.1 ( @ 00:16)  HR: 85 (77 - 88)  BP: 156/84 (120/68 - 156/84)  RR: 16 (16 - 16)  SpO2: 98% (97% - 100%)  Wt(kg): --  Respiratory: clear anteriorly, decreased BS at bases  Cardiovascular: S1 S2  Gastrointestinal: soft NT ND +BS  Extremities:   tr edema                  138  |  104  |  41<H>  ----------------------------<  121<H>  4.8   |  25  |  2.20<H>    Ca    8.6      2017 02:27    TPro  6.1  /  Alb  3.0<L>  /  TBili  0.5  /  DBili  x   /  AST  15  /  ALT  15  /  AlkPhos  85                            14.8   7.5   )-----------( 197      ( 2017 21:28 )             45.7     Urinalysis Basic - ( 2017 23:39 )    Color: Yellow / Appearance: Slightly Turbid / S.010 / pH: x  Gluc: x / Ketone: Trace  / Bili: Small / Urobili: Negative   Blood: x / Protein: 25 mg/dL / Nitrite: Negative   Leuk Esterase: Moderate / RBC: 0-2 /HPF / WBC >50   Sq Epi: x / Non Sq Epi: Few / Bacteria: TNTC            Assessment and Plan    SARA, CKD; pre renal azotemia, UTI.  IV Abx; follow urine eosinophils.   Will follow.
Patient is a 89y old  Female who presents with a chief complaint of lab work and Md office (01 May 2017 10:10)      Subjective: Patient seen and examined at bedside. No acute events overnight.     PAIN: 0  DYSPNEA: n  NAUS/VOM: n	  SECRETIONS: n  AGITATION: n    OTHER REVIEW OF SYSTEMS:  negative    T(C): 36.3, Max: 37.1 (04-30 @ 22:38)  HR: 74 (71 - 82)  BP: 126/63 (102/60 - 172/92)  RR: 16 (16 - 17)  SpO2: 100% (99% - 100%)  Wt(kg): --  GENERAL:  NAD  HEENT:  NC/AT EOMI PERRL  NECK: supple no JVD  CVS:  +S1 S2 RRR  RESP: CTA B/L  GI:  soft NT/ND +BS  : no suprapubic tenderness  MUSC:  no lower extremity edema  NEURO:  AAOX3, no focal motor or sensory deficits   PSYCH:  Alert, Calm, Cooperative   SKIN:  Warm, moist, no rashes   LYMPH: normal     MEDS REVIEWED	          OPIATE NAÏVE (Y/N)    No Known Allergies      LABS REVIEWED:                        14.4   4.8   )-----------( 164      ( 01 May 2017 08:20 )             45.7     05-01    136  |  103  |  22  ----------------------------<  88  4.6   |  22  |  1.40<H>    Ca    9.3      01 May 2017 08:20        IMAGING REVIEWED    ADVANCED DIRECTIVES:     FULL CODE         PSYCHOSOCIAL-SPIRITUAL ASSESSMENT:    ___Reviewed     _x__Care  plan unchanged     ___Care plan adjusted as below    GOALS OF CARE DISCUSSION  	__x_Palliative care info/counseling provided	    ___Family meeting  	_x__Advanced Directives addressed	    _x__See previous Palliative Medicine Note    AGENCY CHOICE DISCUSSED:   ___HOSPICE   ___CALVARY  ___OTHER:

## 2017-05-01 NOTE — PROGRESS NOTE ADULT - PROBLEM SELECTOR PLAN 4
home meds held 2/2 SARA  pt was started on metoprolol 12.5 but BP still elevated, will start hydralazine 25mg bid as per Nephro recc home meds held sec to low bp 1st   pt was started on metoprolol 12.5 controlled on it.

## 2017-05-01 NOTE — CONSULT NOTE ADULT - PROBLEM SELECTOR RECOMMENDATION 4
likely secondary to lasix, losartan, sepsis  improving  con't IVF  serial labs
likely secondary to lasix, losartan, sepsis  improving  con't IVF  serial labs

## 2017-05-01 NOTE — DISCHARGE NOTE ADULT - MEDICATION SUMMARY - MEDICATIONS TO STOP TAKING
I will STOP taking the medications listed below when I get home from the hospital:    losartan  -- 50 milligram(s) by mouth once a day    furosemide  -- 40 milligram(s) by mouth once a day

## 2017-05-01 NOTE — PROGRESS NOTE ADULT - SUBJECTIVE AND OBJECTIVE BOX
cc/o weakness     HPI:  This is a 89 y/o female with PMHx HTN, HLD, TIA, GERD, Barretts esophagus, arthritis p/w AMS, confusion s/p fall 1 week ago. Pt was sent by her PCP Dr. Mendoza who noticed she was obviously confused. Pt admitted with dehydration and alverto 2/2 to home meds and decreased po intake as well as with UTI which is the possible cause of the AMS. Urine culture showed 100,000 CFU/ml Escherichia coli, pt refused IV for IV abx. Will talk to ID and start on oral abx.  Pt had no events overnight.              Vital Signs Last 24 Hrs  T(C): 36.7, Max: 37.1 (04-30 @ 22:38)  T(F): 98, Max: 98.8 (04-30 @ 22:38)  HR: 74 (66 - 82)  BP: 172/92 (165/80 - 172/92)  BP(mean): --  RR: 17 (16 - 17)  SpO2: 99% (99% - 99%)    I&O's Detail    I & Os for current day (as of 01 May 2017 10:22)  =============================================  IN:    Oral Fluid: 240 ml    Total IN: 240 ml  ---------------------------------------------  OUT:    Total OUT: 0 ml  ---------------------------------------------  Total NET: 240 ml      REVIEW OF SYSTEMS:  CONSTITUTIONAL: No fever, present  fatigue  EYES: No eye pain,   ENMT:  No difficulty hearing,  NECK: No pain or stiffness  BREASTS: No pain,   RESPIRATORY: No cough, wheezing, chills or hemoptysis; No shortness of breath  CARDIOVASCULAR: No chest pain, palpitations, dizziness, or leg swelling  GASTROINTESTINAL: No abdominal or epigastric pain. No nausea, vomiting  GENITOURINARY: No dysuria, frequency, hematuria, or incontinence  NEUROLOGICAL: No headaches,   SKIN: No itching, burning, rashes, or lesions   LYMPH NODES: No enlarged glands  MUSCULOSKELETAL: No joint pain or swelling; No muscle, back, or extremity pain      PHYSICAL EXAM:  GENERAL: NAD, weakness   HEAD:  Atraumatic, Normocephalic  EYES: EOMI, PERRLA,  ENMT:  Moist mucous membranes,   NECK: Supple, No JVD,   NERVOUS SYSTEM:  Alert & Oriented X2 , unsteady gait   CHEST/LUNG: Clear to percussion bilaterally; No rales, rhonchi, wheezing, or rubs  HEART: Regular rate and rhythm; No murmurs,  ABDOMEN: Soft, Nontender, Nondistended; Bowel sounds present  EXTREMITIES:  2+ Peripheral Pulses, No clubbing, cyanosis, or edema  gu intact     MEDICATIONS  (STANDING):  heparin  Injectable 5000Unit(s) SubCutaneous every 12 hours  levothyroxine 75MICROGram(s) Oral daily  metoprolol 12.5milliGRAM(s) Oral daily  pantoprazole    Tablet 40milliGRAM(s) Oral daily  hydrALAZINE 25milliGRAM(s) Oral every 12 hours    MEDICATIONS  (PRN):  acetaminophen   Tablet. 650milliGRAM(s) Oral every 6 hours PRN Mild Pain (1 - 3)    LABS:                                      14.4   4.8   )-----------( 164      ( 01 May 2017 08:20 )             45.7     05-01    136  |  103  |  22  ----------------------------<  88  4.6   |  22  |  1.40<H>    Ca    9.3      01 May 2017 08:20      CAPILLARY BLOOD GLUCOSE      04-29 @ 10:27   >100,000 CFU/ml Escherichia coli  --  --          RADIOLOGY & ADDITIONAL TESTS:    Imaging Personally Reviewed:      Renal US - No hydronephrosis.      Advance Directives: full code       Palliative Care: pending consult cc/o weakness     HPI:  This is a 89 y/o female with PMHx HTN, HLD, TIA, GERD, Barretts esophagus, arthritis p/w AMS, confusion s/p fall 1 week ago. Pt was sent by her PCP Dr. Mendoza who noticed she was obviously confused. Pt admitted with dehydration and alverto 2/2 to home meds and decreased po intake as well as with UTI which is the possible cause of the AMS. Urine culture showed 100,000 CFU/ml Escherichia coli, pt refused IV for IV abx on po abx   Pt had no events overnight. ams sec to uti resolved.             Vital Signs Last 24 Hrs  T(C): 36.7, Max: 37.1 (04-30 @ 22:38)  T(F): 98, Max: 98.8 (04-30 @ 22:38)  HR: 74 (66 - 82)  BP: 172/92 (165/80 - 172/92)  BP(mean): --  RR: 17 (16 - 17)  SpO2: 99% (99% - 99%)    I&O's Detail    I & Os for current day (as of 01 May 2017 10:22)  =============================================  IN:    Oral Fluid: 240 ml    Total IN: 240 ml  ---------------------------------------------  OUT:    Total OUT: 0 ml  ---------------------------------------------  Total NET: 240 ml      REVIEW OF SYSTEMS:  CONSTITUTIONAL: No fever, present  fatigue  EYES: No eye pain,   ENMT:  No difficulty hearing,  NECK: No pain or stiffness  BREASTS: No pain,   RESPIRATORY: No cough, wheezing, chills or hemoptysis; No shortness of breath  CARDIOVASCULAR: No chest pain, palpitations, dizziness, or leg swelling  GASTROINTESTINAL: No abdominal or epigastric pain. No nausea, vomiting  GENITOURINARY: No dysuria, frequency, hematuria,   NEUROLOGICAL: No headaches,   SKIN: No itching, burning, rashes, or lesion  MUSCULOSKELETAL: No joint pain or swelling; No muscle, back, or extremity pain      PHYSICAL EXAM:  GENERAL: NAD, weakness   HEAD:  Atraumatic, Normocephalic  EYES: EOMI, PERRLA,  ENMT:  Moist mucous membranes,   NECK: Supple, ,   NERVOUS SYSTEM:  Alert & Oriented X2 , no confusion , motor intact bl low ext  CHEST/LUNG: Clear to percussion bilaterally; No rales, rhonchi, wheezing, or rubs  HEART: Regular rate and rhythm; No murmurs,  ABDOMEN: Soft, Nontender, Nondistended; Bowel sounds present  EXTREMITIES:  2+ Peripheral Pulses, No clubbing, cyanosis, or edema  gu intact     MEDICATIONS  (STANDING):  heparin  Injectable 5000Unit(s) SubCutaneous every 12 hours  levothyroxine 75MICROGram(s) Oral daily  metoprolol 12.5milliGRAM(s) Oral daily  pantoprazole    Tablet 40milliGRAM(s) Oral daily  hydrALAZINE 25milliGRAM(s) Oral every 12 hours    MEDICATIONS  (PRN):  acetaminophen   Tablet. 650milliGRAM(s) Oral every 6 hours PRN Mild Pain (1 - 3)    LABS:                                      14.4   4.8   )-----------( 164      ( 01 May 2017 08:20 )             45.7     05-01    136  |  103  |  22  ----------------------------<  88  4.6   |  22  |  1.40<H>    Ca    9.3      01 May 2017 08:20      CAPILLARY BLOOD GLUCOSE      04-29 @ 10:27   >100,000 CFU/ml Escherichia coli  --  --          RADIOLOGY & ADDITIONAL TESTS:    Imaging Personally Reviewed:      Renal US - No hydronephrosis.      Advance Directives: full code       Palliative Care: pending consult

## 2017-05-01 NOTE — DISCHARGE NOTE ADULT - MEDICATION SUMMARY - MEDICATIONS TO TAKE
I will START or STAY ON the medications listed below when I get home from the hospital:    aspirin 81 mg oral tablet  -- 1 tab(s) by mouth once a day  -- Indication: For Hx tia    acetaminophen 325 mg oral tablet  -- 2 tab(s) by mouth every 8 hours, As Needed -pain/ back  -- Indication: For back pain/ compression frcature    Metoprolol Succinate ER 25 mg oral tablet, extended release  -- 0.5 tab(s) by mouth once a day MDD:1  -- It is very important that you take or use this exactly as directed.  Do not skip doses or discontinue unless directed by your doctor.  May cause drowsiness.  Alcohol may intensify this effect.  Use care when operating dangerous machinery.  Some non-prescription drugs may aggravate your condition.  Read all labels carefully.  If a warning appears, check with your doctor before taking.  Swallow whole.  Do not crush.  Take with food or milk.  This drug may impair the ability to drive or operate machinery.  Use care until you become familiar with its effects.    -- Indication: For Htn    RABEprazole 20 mg oral tablet, extended release  -- 2 tab(s) by mouth once a day  -- Indication: For Gi prophy     Cipro 250 mg oral tablet  -- 1 tab(s) by mouth once a day  -- Avoid prolonged or excessive exposure to direct and/or artificial sunlight while taking this medication.  Check with your doctor before becoming pregnant.  Do not take dairy products, antacids, or iron preparations within one hour of this medication.  Finish all this medication unless otherwise directed by prescriber.  Medication should be taken with plenty of water.    -- Indication: For UTI ecoli     levothyroxine  -- 75 milligram(s) by mouth once a day  -- Indication: For Hypothyrodism    Vitamin D3 1000 intl units oral tablet  -- 1 tab(s) by mouth once a day  -- Indication: For Need for prophylactic measure

## 2017-05-01 NOTE — PROGRESS NOTE ADULT - ASSESSMENT
This is a 89 y/o female with PMHx HTN, HLD, TIA, GERD, Barretts esophagus, arthritis p/w AMS, confusion s/p fall 1 week ago a/w AMS, UTI and SARA / dehydration  and meds induce . This is a 89 y/o female with PMHx HTN, HLD, TIA, GERD, Barretts esophagus, arthritis p/w AMS, confusion s/p fall 1 week ago a/w AMS, UTI ecoli and SARA / dehydration  and meds induce . post fall/ compression fract age ind ermine t8 endplate  sec to osteopenia / no need rehab

## 2017-05-01 NOTE — PROGRESS NOTE ADULT - SUBJECTIVE AND OBJECTIVE BOX
Patient seen in follow up for SARA. Improving renal function. Daughter at bedside.     PAST MEDICAL HISTORY:  Low sodium levels  TIA (transient ischemic attack)  Arthritis  Hypercholesterolemia  HTN - Hypertension  GERD (gastroesophageal reflux disease)  Damian esophagus    MEDICATIONS  (STANDING):  heparin  Injectable 5000Unit(s) SubCutaneous every 12 hours  levothyroxine 75MICROGram(s) Oral daily  metoprolol 12.5milliGRAM(s) Oral daily  pantoprazole    Tablet 40milliGRAM(s) Oral daily    MEDICATIONS  (PRN):  acetaminophen   Tablet. 650milliGRAM(s) Oral every 6 hours PRN Mild Pain (1 - 3)    T(C): 36.7, Max: 37.1 (04-30 @ 22:38)  HR: 71 (66 - 82)  BP: 172/92 (160/80 - 187/93)  RR: 17 (16 - 17)  SpO2: 99% (95% - 99%)  Wt(kg): --  I&O's Detail    I & Os for current day (as of 01 May 2017 09:32)  =============================================  IN:    Oral Fluid: 240 ml    Total IN: 240 ml  ---------------------------------------------  OUT:    Total OUT: 0 ml  ---------------------------------------------  Total NET: 240 ml      PHYSICAL EXAM:  General: NAD  Respiratory: b/l air entry  Cardiovascular: S1 S2  Gastrointestinal: soft  Extremities: no edema                           14.4   4.8   )-----------( 164      ( 01 May 2017 08:20 )             45.7     05-01    136  |  103  |  22  ----------------------------<  88  4.6   |  22  |  1.40<H>    Ca    9.3      01 May 2017 08:20        Sodium, Serum: 136 (05-01 @ 08:20)  Sodium, Serum: 134 (04-30 @ 15:50)  Sodium, Serum: 138 (04-29 @ 02:27)  Sodium, Serum: 134 (04-28 @ 21:28)    Creatinine, Serum: 1.40 (05-01 @ 08:20)  Creatinine, Serum: 1.50 (04-30 @ 15:50)  Creatinine, Serum: 2.20 (04-29 @ 02:27)  Creatinine, Serum: 2.60 (04-28 @ 21:28)    Potassium, Serum: 4.6 (05-01 @ 08:20)  Potassium, Serum: 5.1 (04-30 @ 15:50)  Potassium, Serum: 4.8 (04-29 @ 02:27)  Potassium, Serum: 5.8 (04-28 @ 21:28)    Hemoglobin: 14.4 (05-01 @ 08:20)  Hemoglobin: 14.7 (04-30 @ 15:50)  Hemoglobin: 14.8 (04-28 @ 21:28)

## 2017-05-01 NOTE — DISCHARGE NOTE ADULT - PATIENT PORTAL LINK FT
“You can access the [facility] Patient Portal, offered by [facility], by registering with the following website: [email or web address]”

## 2017-05-01 NOTE — DISCHARGE NOTE ADULT - NS AS DC STROKE ED MATERIALS
Stroke Warning Signs and Symptoms/Call 911 for Stroke/Prescribed Medications/Advanced age is a risk factor for Strokes/Stroke Education Booklet/Need for Followup After Discharge/Risk Factors for Stroke Advanced age is a risk factor for Strokes

## 2017-05-01 NOTE — PROGRESS NOTE ADULT - PROBLEM SELECTOR PLAN 1
-possiblly 2/2 UTI  -CT head negative  -IVF stopped as pt refused IV -possiblly 2/2 UTI ecoli   -CT head negative  -IVF stopped as pt refused IV

## 2017-05-01 NOTE — PROGRESS NOTE ADULT - PROBLEM SELECTOR PROBLEM 2
UTI (urinary tract infection)
Hypertension

## 2017-05-01 NOTE — DISCHARGE NOTE ADULT - CARE PROVIDER_API CALL
Dr Tin  Phone: (   )    -  Fax: (   )    - Joshua Carranza), Medicine  [address] [place] [address]  Phone: [phone]  Fax: [phone]

## 2017-05-01 NOTE — PROGRESS NOTE ADULT - PROBLEM SELECTOR PLAN 1
Prerenal azotemia / ARB rx.   Improving renal function. Avoid nephrotoxic meds as possible.   Will follow lytes and renal function trend.

## 2017-05-01 NOTE — CONSULT NOTE ADULT - ASSESSMENT
This is a 89 F comes s/p fall, change in mental status, SARA, UTI
This is a 89 F comes s/p fall, change in mental status, SARA, UTI

## 2017-05-01 NOTE — CONSULT NOTE ADULT - PROBLEM SELECTOR PROBLEM 3
Urinary tract infection without hematuria, site unspecified
Urinary tract infection without hematuria, site unspecified

## 2017-05-01 NOTE — PROGRESS NOTE ADULT - PROBLEM SELECTOR PLAN 3
-SARA with Cr improving with IVF (baseline Cr 0.9) currently 1.4 from 1.5 yesterday  -Renal US to no obstruction, no hydro   - encouraged increased PO intake as pt refused IV  -Nephro Dr. Carranza- [facility] to add hydralazine 25mg bid. Salt restriction. Avoid ARB / ACEI for now. Hold discharge until BP better controlled. -SARA with Cr improving with IVF (baseline Cr 0.9) currently 1.4 from 1.5 yesterday  -Renal US to no obstruction, no hydro   - encouraged increased PO intake as pt refused IV  -Nephro Dr. Carranza- . Avoid ARB / ACEI for now.

## 2017-05-01 NOTE — PROGRESS NOTE ADULT - PROBLEM SELECTOR PLAN 2
- ecoli uti - pt was started on Rocephin 1g IV q24 hours as pt refused IV abx  - will speak to ID and start oral abx  -f/u UCx - ecoli uti - pt was started on Rocephin 1g IV q24 hours as pt refused IV abx change to po sens to cipro

## 2017-05-01 NOTE — DISCHARGE NOTE ADULT - ADDITIONAL INSTRUCTIONS
hold arb and lasix  start metoprolol 12.5 BID  continue cipro 250 daily for 5 more days  F/u with PMD within the week hold arb and lasix  start metoprolol 12.5 daily / increase dose if need with fu pmd office with in 1wk after checking bp , avoid all nephrotoxic meds.  continue cipro 250 daily for 5 more days  F/u with PMD within the week / fu dr yuan in 1wk for repeat bmp/electrolyte. hold arb and lasix  start metoprolol 12.5 daily / increase dose if need with fu pmd office dr pako garcia [phone]  with in 1wk after checking bp , avoid all nephrotoxic meds.  continue cipro 250 daily for 5 more days  F/u with PMD within the 1week dr pako garcia  / fu dr yuan in 1wk for repeat bmp/electrolyte.

## 2017-05-01 NOTE — CONSULT NOTE ADULT - PROBLEM SELECTOR RECOMMENDATION 9
Spoke to daughter and patient at length yesterday. Patient has never expressed to her regarding her wishes for end-of-life care. I went over DNR/DNI and artifical nutrition. Patient and the daughter do not want to make any decisions now. They will discuss it when patient has fully recovered.
Spoke to daughter and patient at length. Since patient did not have her hearing aides, she had some difficulty understanding even though she was A & O x 3. Daughter says that patient is almost fully independent (x doesn't drive). Patient has never expressed to her regarding her wishes for end-of-life care. I went over DNR/DNI and artifical nutrition. Patient and the daughter do not want to make any decisions now. They will discuss it when patient has fully recovered. Will order PT to assess gait and risk for falls.

## 2017-05-03 ENCOUNTER — APPOINTMENT (OUTPATIENT)
Dept: INTERNAL MEDICINE | Facility: CLINIC | Age: 82
End: 2017-05-03

## 2017-05-03 DIAGNOSIS — Z87.440 PERSONAL HISTORY OF URINARY (TRACT) INFECTIONS: ICD-10-CM

## 2017-05-03 DIAGNOSIS — R41.82 ALTERED MENTAL STATUS, UNSPECIFIED: ICD-10-CM

## 2017-05-03 PROBLEM — E87.1 HYPO-OSMOLALITY AND HYPONATREMIA: Chronic | Status: ACTIVE | Noted: 2017-04-28

## 2017-05-04 VITALS — HEART RATE: 76 BPM | DIASTOLIC BLOOD PRESSURE: 80 MMHG | SYSTOLIC BLOOD PRESSURE: 126 MMHG

## 2017-05-04 PROBLEM — R41.82 ALTERED MENTAL STATUS: Status: RESOLVED | Noted: 2017-04-29 | Resolved: 2017-05-04

## 2017-05-05 DIAGNOSIS — Z91.81 HISTORY OF FALLING: ICD-10-CM

## 2017-05-05 DIAGNOSIS — M19.90 UNSPECIFIED OSTEOARTHRITIS, UNSPECIFIED SITE: ICD-10-CM

## 2017-05-05 DIAGNOSIS — I10 ESSENTIAL (PRIMARY) HYPERTENSION: ICD-10-CM

## 2017-05-05 DIAGNOSIS — N39.0 URINARY TRACT INFECTION, SITE NOT SPECIFIED: ICD-10-CM

## 2017-05-05 DIAGNOSIS — B96.20 UNSPECIFIED ESCHERICHIA COLI [E. COLI] AS THE CAUSE OF DISEASES CLASSIFIED ELSEWHERE: ICD-10-CM

## 2017-05-05 DIAGNOSIS — K21.9 GASTRO-ESOPHAGEAL REFLUX DISEASE WITHOUT ESOPHAGITIS: ICD-10-CM

## 2017-05-05 DIAGNOSIS — N17.9 ACUTE KIDNEY FAILURE, UNSPECIFIED: ICD-10-CM

## 2017-05-05 DIAGNOSIS — E87.1 HYPO-OSMOLALITY AND HYPONATREMIA: ICD-10-CM

## 2017-05-05 DIAGNOSIS — E78.5 HYPERLIPIDEMIA, UNSPECIFIED: ICD-10-CM

## 2017-05-05 DIAGNOSIS — K22.70 BARRETT'S ESOPHAGUS WITHOUT DYSPLASIA: ICD-10-CM

## 2017-05-05 DIAGNOSIS — E86.0 DEHYDRATION: ICD-10-CM

## 2017-05-05 DIAGNOSIS — M85.88 OTHER SPECIFIED DISORDERS OF BONE DENSITY AND STRUCTURE, OTHER SITE: ICD-10-CM

## 2017-05-21 ENCOUNTER — MEDICATION RENEWAL (OUTPATIENT)
Age: 82
End: 2017-05-21

## 2017-06-21 ENCOUNTER — MEDICATION RENEWAL (OUTPATIENT)
Age: 82
End: 2017-06-21

## 2017-06-22 ENCOUNTER — MED ADMIN CHARGE (OUTPATIENT)
Age: 82
End: 2017-06-22

## 2017-07-06 ENCOUNTER — CLINICAL ADVICE (OUTPATIENT)
Age: 82
End: 2017-07-06

## 2017-07-08 ENCOUNTER — OUTPATIENT (OUTPATIENT)
Dept: OUTPATIENT SERVICES | Facility: HOSPITAL | Age: 82
LOS: 1 days | End: 2017-07-08
Payer: MEDICARE

## 2017-07-08 DIAGNOSIS — R41.3 OTHER AMNESIA: ICD-10-CM

## 2017-07-08 DIAGNOSIS — Z00.00 ENCOUNTER FOR GENERAL ADULT MEDICAL EXAMINATION WITHOUT ABNORMAL FINDINGS: ICD-10-CM

## 2017-07-08 PROCEDURE — 85027 COMPLETE CBC AUTOMATED: CPT

## 2017-07-08 PROCEDURE — 84443 ASSAY THYROID STIM HORMONE: CPT

## 2017-07-08 PROCEDURE — 82607 VITAMIN B-12: CPT

## 2017-07-08 PROCEDURE — 85652 RBC SED RATE AUTOMATED: CPT

## 2017-07-08 PROCEDURE — 80053 COMPREHEN METABOLIC PANEL: CPT

## 2017-07-08 PROCEDURE — 86618 LYME DISEASE ANTIBODY: CPT

## 2017-07-12 ENCOUNTER — APPOINTMENT (OUTPATIENT)
Dept: INTERNAL MEDICINE | Facility: CLINIC | Age: 82
End: 2017-07-12

## 2017-07-13 ENCOUNTER — MED ADMIN CHARGE (OUTPATIENT)
Age: 82
End: 2017-07-13

## 2017-07-13 RX ORDER — CYANOCOBALAMIN 1000 UG/ML
1000 INJECTION INTRAMUSCULAR; SUBCUTANEOUS
Qty: 0 | Refills: 0 | Status: COMPLETED | OUTPATIENT
Start: 2017-07-13 | End: 2017-07-13

## 2017-07-13 RX ADMIN — CYANOCOBALAMIN 0 MCG/ML: 1000 INJECTION INTRAMUSCULAR; SUBCUTANEOUS at 00:00

## 2017-07-17 ENCOUNTER — MESSAGE (OUTPATIENT)
Age: 82
End: 2017-07-17

## 2017-07-19 ENCOUNTER — APPOINTMENT (OUTPATIENT)
Dept: GASTROENTEROLOGY | Facility: CLINIC | Age: 82
End: 2017-07-19

## 2017-07-19 VITALS
OXYGEN SATURATION: 96 % | SYSTOLIC BLOOD PRESSURE: 145 MMHG | HEART RATE: 85 BPM | HEIGHT: 61 IN | BODY MASS INDEX: 23.79 KG/M2 | WEIGHT: 126 LBS | DIASTOLIC BLOOD PRESSURE: 80 MMHG | TEMPERATURE: 98.4 F

## 2017-07-26 ENCOUNTER — MED ADMIN CHARGE (OUTPATIENT)
Age: 82
End: 2017-07-26

## 2017-07-26 ENCOUNTER — APPOINTMENT (OUTPATIENT)
Dept: INTERNAL MEDICINE | Facility: CLINIC | Age: 82
End: 2017-07-26

## 2017-07-26 RX ORDER — CYANOCOBALAMIN 1000 UG/ML
1000 INJECTION INTRAMUSCULAR; SUBCUTANEOUS
Qty: 0 | Refills: 0 | Status: COMPLETED | OUTPATIENT
Start: 2017-07-26

## 2017-07-26 RX ADMIN — Medication 0 MCG/ML: at 00:00

## 2017-08-02 ENCOUNTER — APPOINTMENT (OUTPATIENT)
Dept: INTERNAL MEDICINE | Facility: CLINIC | Age: 82
End: 2017-08-02
Payer: MEDICARE

## 2017-08-02 PROCEDURE — 96372 THER/PROPH/DIAG INJ SC/IM: CPT

## 2017-08-02 RX ADMIN — Medication 0 MCG/ML: at 00:00

## 2017-08-03 RX ORDER — CYANOCOBALAMIN 1000 UG/ML
1000 INJECTION INTRAMUSCULAR; SUBCUTANEOUS
Qty: 0 | Refills: 0 | Status: COMPLETED | OUTPATIENT
Start: 2017-08-02

## 2017-09-06 ENCOUNTER — APPOINTMENT (OUTPATIENT)
Dept: INTERNAL MEDICINE | Facility: CLINIC | Age: 82
End: 2017-09-06
Payer: MEDICARE

## 2017-09-06 PROCEDURE — 96372 THER/PROPH/DIAG INJ SC/IM: CPT

## 2017-09-06 RX ORDER — CYANOCOBALAMIN 1000 UG/ML
1000 INJECTION INTRAMUSCULAR; SUBCUTANEOUS
Qty: 0 | Refills: 0 | Status: COMPLETED | OUTPATIENT
Start: 2017-09-06

## 2017-09-06 RX ADMIN — CYANOCOBALAMIN 0 MCG/ML: 1000 INJECTION INTRAMUSCULAR; SUBCUTANEOUS at 00:00

## 2017-10-09 ENCOUNTER — APPOINTMENT (OUTPATIENT)
Dept: INTERNAL MEDICINE | Facility: CLINIC | Age: 82
End: 2017-10-09
Payer: MEDICARE

## 2017-10-09 PROCEDURE — 96372 THER/PROPH/DIAG INJ SC/IM: CPT

## 2017-10-09 RX ORDER — CYANOCOBALAMIN 1000 UG/ML
1000 INJECTION INTRAMUSCULAR; SUBCUTANEOUS
Qty: 0 | Refills: 0 | Status: COMPLETED | OUTPATIENT
Start: 2017-10-09

## 2017-10-09 RX ADMIN — CYANOCOBALAMIN 0 MCG/ML: 1000 INJECTION INTRAMUSCULAR; SUBCUTANEOUS at 00:00

## 2017-10-23 ENCOUNTER — APPOINTMENT (OUTPATIENT)
Dept: GASTROENTEROLOGY | Facility: CLINIC | Age: 82
End: 2017-10-23
Payer: MEDICARE

## 2017-10-23 VITALS
OXYGEN SATURATION: 98 % | DIASTOLIC BLOOD PRESSURE: 98 MMHG | RESPIRATION RATE: 15 BRPM | SYSTOLIC BLOOD PRESSURE: 140 MMHG | BODY MASS INDEX: 23.22 KG/M2 | TEMPERATURE: 97.9 F | HEIGHT: 61 IN | HEART RATE: 65 BPM | WEIGHT: 123 LBS

## 2017-10-23 PROCEDURE — 99213 OFFICE O/P EST LOW 20 MIN: CPT

## 2017-10-25 ENCOUNTER — APPOINTMENT (OUTPATIENT)
Dept: INTERNAL MEDICINE | Facility: CLINIC | Age: 82
End: 2017-10-25
Payer: MEDICARE

## 2017-10-25 PROCEDURE — 99213 OFFICE O/P EST LOW 20 MIN: CPT | Mod: 25

## 2017-10-25 PROCEDURE — 90662 IIV NO PRSV INCREASED AG IM: CPT

## 2017-10-25 PROCEDURE — G0008: CPT

## 2017-10-26 ENCOUNTER — EMERGENCY (EMERGENCY)
Facility: HOSPITAL | Age: 82
LOS: 1 days | Discharge: HOME | End: 2017-10-26
Attending: EMERGENCY MEDICINE | Admitting: EMERGENCY MEDICINE
Payer: MEDICARE

## 2017-10-26 ENCOUNTER — OTHER (OUTPATIENT)
Age: 82
End: 2017-10-26

## 2017-10-26 VITALS — SYSTOLIC BLOOD PRESSURE: 148 MMHG | DIASTOLIC BLOOD PRESSURE: 72 MMHG | RESPIRATION RATE: 14 BRPM | HEART RATE: 70 BPM

## 2017-10-26 VITALS
OXYGEN SATURATION: 97 % | TEMPERATURE: 98 F | WEIGHT: 125 LBS | SYSTOLIC BLOOD PRESSURE: 184 MMHG | HEIGHT: 61 IN | DIASTOLIC BLOOD PRESSURE: 83 MMHG | RESPIRATION RATE: 14 BRPM | HEART RATE: 76 BPM

## 2017-10-26 VITALS
RESPIRATION RATE: 15 BRPM | HEART RATE: 68 BPM | SYSTOLIC BLOOD PRESSURE: 182 MMHG | OXYGEN SATURATION: 98 % | TEMPERATURE: 98 F | DIASTOLIC BLOOD PRESSURE: 90 MMHG

## 2017-10-26 DIAGNOSIS — Z90.49 ACQUIRED ABSENCE OF OTHER SPECIFIED PARTS OF DIGESTIVE TRACT: ICD-10-CM

## 2017-10-26 DIAGNOSIS — E87.5 HYPERKALEMIA: ICD-10-CM

## 2017-10-26 DIAGNOSIS — E78.00 PURE HYPERCHOLESTEROLEMIA, UNSPECIFIED: ICD-10-CM

## 2017-10-26 DIAGNOSIS — Z86.73 PERSONAL HISTORY OF TRANSIENT ISCHEMIC ATTACK (TIA), AND CEREBRAL INFARCTION WITHOUT RESIDUAL DEFICITS: ICD-10-CM

## 2017-10-26 DIAGNOSIS — K21.9 GASTRO-ESOPHAGEAL REFLUX DISEASE WITHOUT ESOPHAGITIS: ICD-10-CM

## 2017-10-26 DIAGNOSIS — I10 ESSENTIAL (PRIMARY) HYPERTENSION: ICD-10-CM

## 2017-10-26 DIAGNOSIS — Z79.82 LONG TERM (CURRENT) USE OF ASPIRIN: ICD-10-CM

## 2017-10-26 LAB
ALBUMIN SERPL ELPH-MCNC: 4.3 G/DL
ALP BLD-CCNC: 106 U/L
ALT SERPL-CCNC: 11 U/L
ANION GAP SERPL CALC-SCNC: 10 MMOL/L — SIGNIFICANT CHANGE UP (ref 5–17)
ANION GAP SERPL CALC-SCNC: 17 MMOL/L
AST SERPL-CCNC: 16 U/L
BILIRUB SERPL-MCNC: 0.2 MG/DL
BUN SERPL-MCNC: 30 MG/DL — HIGH (ref 7–23)
BUN SERPL-MCNC: 31 MG/DL
CALCIUM SERPL-MCNC: 10.3 MG/DL
CALCIUM SERPL-MCNC: 8.9 MG/DL — SIGNIFICANT CHANGE UP (ref 8.5–10.1)
CHLORIDE SERPL-SCNC: 104 MMOL/L
CHLORIDE SERPL-SCNC: 107 MMOL/L — SIGNIFICANT CHANGE UP (ref 96–108)
CO2 SERPL-SCNC: 22 MMOL/L
CO2 SERPL-SCNC: 23 MMOL/L — SIGNIFICANT CHANGE UP (ref 22–31)
CREAT SERPL-MCNC: 1.51 MG/DL
CREAT SERPL-MCNC: 1.7 MG/DL — HIGH (ref 0.5–1.3)
FOLATE SERPL-MCNC: 10 NG/ML
GLUCOSE SERPL-MCNC: 113 MG/DL
GLUCOSE SERPL-MCNC: 89 MG/DL — SIGNIFICANT CHANGE UP (ref 70–99)
POTASSIUM SERPL-MCNC: 4.3 MMOL/L — SIGNIFICANT CHANGE UP (ref 3.5–5.3)
POTASSIUM SERPL-SCNC: 4.3 MMOL/L — SIGNIFICANT CHANGE UP (ref 3.5–5.3)
POTASSIUM SERPL-SCNC: 6.6 MMOL/L
PROT SERPL-MCNC: 7.3 G/DL
SODIUM SERPL-SCNC: 140 MMOL/L — SIGNIFICANT CHANGE UP (ref 135–145)
SODIUM SERPL-SCNC: 143 MMOL/L
T4 FREE SERPL-MCNC: 1.6 NG/DL
TSH SERPL-ACNC: 1.47 UIU/ML
VIT B12 SERPL-MCNC: 562 PG/ML

## 2017-10-26 PROCEDURE — 99283 EMERGENCY DEPT VISIT LOW MDM: CPT

## 2017-10-26 PROCEDURE — 36000 PLACE NEEDLE IN VEIN: CPT

## 2017-10-26 PROCEDURE — 80048 BASIC METABOLIC PNL TOTAL CA: CPT

## 2017-10-26 PROCEDURE — 36415 COLL VENOUS BLD VENIPUNCTURE: CPT

## 2017-10-26 PROCEDURE — 99284 EMERGENCY DEPT VISIT MOD MDM: CPT | Mod: 25

## 2017-10-26 RX ORDER — FUROSEMIDE 20 MG/1
20 TABLET ORAL DAILY
Qty: 5 | Refills: 0 | Status: ACTIVE | COMMUNITY
Start: 2017-10-26 | End: 1900-01-01

## 2017-10-26 NOTE — ED ADULT NURSE NOTE - OBJECTIVE STATEMENT
Pt received alert and oriented x 3, [place], ambulatory for abnormal lab result. Pt stated went in for a regular check up at PMD, received a call from PMD office stating high wn0dghdeal of 6.6. Pt denies any chest pain or any discomfort. Pt denies any palpitations, dizziness, headache, ab pain, nausea, vomiting, sob. No acute s/s of distress.

## 2017-10-26 NOTE — ED ADULT TRIAGE NOTE - CHIEF COMPLAINT QUOTE
"She had blood work done at Dr. Neil' office at 1730 yesterday, the covering doctor called last night and said to bring her to the ER for a high potassium level"

## 2017-10-26 NOTE — ED PROVIDER NOTE - MEDICAL DECISION MAKING DETAILS
Allergic reaction requiring evaluation and meds Elevated serum potassium requiring evaluation and recheck of K+

## 2017-10-26 NOTE — ED PROVIDER NOTE - CARE PLAN
Principal Discharge DX:	Allergic reaction, initial encounter Principal Discharge DX:	Essential hypertension

## 2017-10-26 NOTE — ED PROVIDER NOTE - OBJECTIVE STATEMENT
89 yo white female, feeling well, had routine phlebotomy last evening at PMDs office, told to come here this morning for elevated potassium result. Patient feels well at this time.

## 2017-10-30 ENCOUNTER — RX RENEWAL (OUTPATIENT)
Age: 82
End: 2017-10-30

## 2017-12-08 ENCOUNTER — APPOINTMENT (OUTPATIENT)
Dept: INTERNAL MEDICINE | Facility: CLINIC | Age: 82
End: 2017-12-08
Payer: MEDICARE

## 2017-12-08 PROCEDURE — 96372 THER/PROPH/DIAG INJ SC/IM: CPT

## 2017-12-08 RX ORDER — CYANOCOBALAMIN 1000 UG/ML
1000 INJECTION INTRAMUSCULAR; SUBCUTANEOUS
Qty: 0 | Refills: 0 | Status: COMPLETED | OUTPATIENT
Start: 2017-12-08

## 2017-12-08 RX ADMIN — CYANOCOBALAMIN 0 MCG/ML: 1000 INJECTION INTRAMUSCULAR; SUBCUTANEOUS at 00:00

## 2018-01-05 ENCOUNTER — APPOINTMENT (OUTPATIENT)
Dept: INTERNAL MEDICINE | Facility: CLINIC | Age: 83
End: 2018-01-05

## 2018-01-30 NOTE — PATIENT PROFILE ADULT. - SPIRITUAL ADVISOR NOTIFIED, PROFILE
Subjective:       Patient ID: Elzbieta Davis is a 53 y.o. female.    Chief Complaint: Sore Throat; Otalgia; and Cough    New to me but seen previously in clinic by a fellow provider.         Sinusitis   This is a new problem. The current episode started 1 to 4 weeks ago (~10 days). The problem has been gradually worsening since onset. The maximum temperature recorded prior to her arrival was 100.4 - 100.9 F. The fever has been present for 3 to 4 days. The pain is moderate. Associated symptoms include chills, congestion, coughing, diaphoresis, ear pain, a hoarse voice, sinus pressure, sneezing, a sore throat and swollen glands. Pertinent negatives include no headaches, neck pain or shortness of breath. Past treatments include sitting up, lying down and acetaminophen. The treatment provided mild relief.     Review of Systems   Constitutional: Positive for chills, diaphoresis and fatigue. Negative for activity change, appetite change, fever and unexpected weight change.   HENT: Positive for congestion, ear pain, hoarse voice, postnasal drip, rhinorrhea, sinus pain, sinus pressure, sneezing, sore throat and voice change. Negative for ear discharge, tinnitus and trouble swallowing.    Respiratory: Positive for cough. Negative for chest tightness, shortness of breath and wheezing.    Cardiovascular: Negative for chest pain and palpitations.   Gastrointestinal: Negative for abdominal pain, constipation, diarrhea, nausea and vomiting.   Genitourinary: Negative for difficulty urinating, dysuria and menstrual problem.   Musculoskeletal: Negative for arthralgias, back pain, myalgias and neck pain.   Skin: Negative for rash.   Allergic/Immunologic: Positive for environmental allergies.   Neurological: Negative for headaches.   All other systems reviewed and are negative.      Objective:      Physical Exam   Constitutional: She is oriented to person, place, and time. Vital signs are normal. She appears well-developed and  well-nourished. She is active and cooperative.   HENT:   Head: Normocephalic and atraumatic.   Right Ear: External ear and ear canal normal. Tympanic membrane is injected and bulging.   Left Ear: External ear and ear canal normal. Tympanic membrane is injected and scarred.   Nose: Mucosal edema and rhinorrhea present. No sinus tenderness or nasal deformity. Right sinus exhibits maxillary sinus tenderness. Right sinus exhibits no frontal sinus tenderness. Left sinus exhibits maxillary sinus tenderness. Left sinus exhibits no frontal sinus tenderness.   Mouth/Throat: Uvula is midline and mucous membranes are normal. No uvula swelling. Oropharyngeal exudate (small punctate) and posterior oropharyngeal erythema present. No posterior oropharyngeal edema or tonsillar abscesses.   Eyes: Conjunctivae and lids are normal. Pupils are equal, round, and reactive to light.   Neck: Trachea normal and normal range of motion. No tracheal tenderness, no spinous process tenderness and no muscular tenderness present. No neck rigidity. No tracheal deviation present.   Hoarseness   Cardiovascular: Normal rate, regular rhythm, normal heart sounds and intact distal pulses.    Pulmonary/Chest: Effort normal and breath sounds normal. No stridor. She has no wheezes. She has no rales.   Abdominal: Soft. Bowel sounds are normal. There is no tenderness.   Lymphadenopathy:        Head (right side): Tonsillar adenopathy present.        Head (left side): Tonsillar adenopathy present.     She has cervical adenopathy.        Right: No supraclavicular adenopathy present.        Left: No supraclavicular adenopathy present.   Neurological: She is alert and oriented to person, place, and time. No cranial nerve deficit.   Skin: Skin is warm, dry and intact. No rash noted.   Psychiatric: She has a normal mood and affect. Her speech is normal and behavior is normal. Judgment and thought content normal. Cognition and memory are normal.   Nursing note and  vitals reviewed.      Assessment:       1. Laryngopharyngitis    2. Bacterial sinusitis    3. Essential hypertension    4. Tobacco use        Plan:       1. Laryngopharyngitis  Symptomatic therapy suggested: rest, increase fluids, gargle prn for sore throat, apply heat to sinuses prn, use mist of vaporizer prn, avoid aspirin and NSAID's, caffeine and decongestants and call prn if symptoms persist or worsen. Call or return to clinic prn if these symptoms worsen or fail to improve as anticipated.    - methylPREDNISolone acetate injection 40 mg; Inject 1 mL (40 mg total) into the muscle one time.  - promethazine-dextromethorphan (PROMETHAZINE-DM) 6.25-15 mg/5 mL Syrp; Take 5 mLs by mouth every 4 to 6 hours as needed.  Dispense: 240 mL; Refill: 2  - doxycycline (MONODOX) 100 MG capsule; Take 1 capsule (100 mg total) by mouth every 12 (twelve) hours.  Dispense: 14 capsule; Refill: 0    2. Bacterial sinusitis  Same as above  - methylPREDNISolone acetate injection 40 mg; Inject 1 mL (40 mg total) into the muscle one time.  - promethazine-dextromethorphan (PROMETHAZINE-DM) 6.25-15 mg/5 mL Syrp; Take 5 mLs by mouth every 4 to 6 hours as needed.  Dispense: 240 mL; Refill: 2  - doxycycline (MONODOX) 100 MG capsule; Take 1 capsule (100 mg total) by mouth every 12 (twelve) hours.  Dispense: 14 capsule; Refill: 0    3. Essential hypertension  Noncompliant with medication. Has not taken Lotrel in days. Asymptomatic. Neurovascularly intact.     Medication compliance was discussed with the patient.   Medication side effects were discussed.  The patient was instructed on using exercise frequently to reduce blood pressure.  Thirty to forty-five minutes of brisk walking three to four times a week is often helpful to lower your blood pressure.  Monitor blood pressures at home and to record the values in a log.  The patient was instructed to monitor weight closely and to try to keep it as close to ideal body weight as possible.  Reduce  salt intake to less than 2 grams per day.  Do not add salt to food at the table.  Reduce or get rid of salt used in cooking.  Limit processed and fast foods.  Read package labels for amount of salt (soduim) in foods.  Losing weight, even just 10 pounds, of can decrease blood pressure.    4. Tobacco use  Ready to quit: No  Counseling given: Yes  refused smoking cessation    CHAYITO Sheriff, FNP-C  Family/Internal Medicine  [facility]            no

## 2018-02-01 ENCOUNTER — MEDICATION RENEWAL (OUTPATIENT)
Age: 83
End: 2018-02-01

## 2018-02-14 ENCOUNTER — APPOINTMENT (OUTPATIENT)
Dept: INTERNAL MEDICINE | Facility: CLINIC | Age: 83
End: 2018-02-14
Payer: MEDICARE

## 2018-02-14 DIAGNOSIS — H61.20 IMPACTED CERUMEN, UNSPECIFIED EAR: ICD-10-CM

## 2018-02-14 DIAGNOSIS — R41.3 OTHER AMNESIA: ICD-10-CM

## 2018-02-14 PROCEDURE — 99213 OFFICE O/P EST LOW 20 MIN: CPT | Mod: 25

## 2018-02-14 PROCEDURE — 96372 THER/PROPH/DIAG INJ SC/IM: CPT

## 2018-02-15 VITALS — DIASTOLIC BLOOD PRESSURE: 82 MMHG | SYSTOLIC BLOOD PRESSURE: 150 MMHG | RESPIRATION RATE: 14 BRPM | HEART RATE: 74 BPM

## 2018-02-15 PROBLEM — H61.20 IMPACTED CERUMEN: Status: ACTIVE | Noted: 2018-02-15

## 2018-02-15 RX ORDER — CYANOCOBALAMIN 1000 UG/ML
1000 INJECTION INTRAMUSCULAR; SUBCUTANEOUS
Qty: 0 | Refills: 0 | Status: COMPLETED | OUTPATIENT
Start: 2018-02-15

## 2018-02-15 RX ADMIN — CYANOCOBALAMIN 0 MCG/ML: 1000 INJECTION INTRAMUSCULAR; SUBCUTANEOUS at 00:00

## 2018-04-23 ENCOUNTER — APPOINTMENT (OUTPATIENT)
Dept: GASTROENTEROLOGY | Facility: CLINIC | Age: 83
End: 2018-04-23
Payer: MEDICARE

## 2018-04-23 VITALS
RESPIRATION RATE: 15 BRPM | TEMPERATURE: 97.6 F | BODY MASS INDEX: 23.41 KG/M2 | HEIGHT: 61 IN | SYSTOLIC BLOOD PRESSURE: 140 MMHG | OXYGEN SATURATION: 97 % | DIASTOLIC BLOOD PRESSURE: 82 MMHG | WEIGHT: 124 LBS | HEART RATE: 61 BPM

## 2018-04-23 DIAGNOSIS — K21.9 GASTRO-ESOPHAGEAL REFLUX DISEASE W/OUT ESOPHAGITIS: ICD-10-CM

## 2018-04-23 PROCEDURE — 99213 OFFICE O/P EST LOW 20 MIN: CPT

## 2018-06-15 ENCOUNTER — APPOINTMENT (OUTPATIENT)
Dept: INTERNAL MEDICINE | Facility: CLINIC | Age: 83
End: 2018-06-15

## 2018-07-05 ENCOUNTER — APPOINTMENT (OUTPATIENT)
Dept: INTERNAL MEDICINE | Facility: CLINIC | Age: 83
End: 2018-07-05
Payer: MEDICARE

## 2018-07-05 PROCEDURE — 99214 OFFICE O/P EST MOD 30 MIN: CPT | Mod: 25

## 2018-07-05 PROCEDURE — 96372 THER/PROPH/DIAG INJ SC/IM: CPT

## 2018-07-05 RX ORDER — CYANOCOBALAMIN 1000 UG/ML
1000 INJECTION INTRAMUSCULAR; SUBCUTANEOUS
Qty: 0 | Refills: 0 | Status: COMPLETED | OUTPATIENT
Start: 2018-07-05

## 2018-07-05 RX ORDER — HALOPERIDOL 0.5 MG/1
0.5 TABLET ORAL AT BEDTIME
Qty: 10 | Refills: 5 | Status: ACTIVE | COMMUNITY
Start: 2018-07-05 | End: 1900-01-01

## 2018-07-05 RX ORDER — CIPROFLOXACIN HYDROCHLORIDE 500 MG/1
500 TABLET, FILM COATED ORAL
Qty: 14 | Refills: 5 | Status: ACTIVE | COMMUNITY
Start: 2017-06-21 | End: 1900-01-01

## 2018-07-05 RX ADMIN — Medication 0 MCG/ML: at 00:00

## 2018-07-07 VITALS — DIASTOLIC BLOOD PRESSURE: 60 MMHG | SYSTOLIC BLOOD PRESSURE: 110 MMHG | HEART RATE: 74 BPM | RESPIRATION RATE: 14 BRPM

## 2018-07-07 RX ORDER — METOPROLOL SUCCINATE 25 MG/1
25 TABLET, EXTENDED RELEASE ORAL
Qty: 90 | Refills: 3 | Status: DISCONTINUED | COMMUNITY
Start: 2017-05-21 | End: 2018-07-07

## 2018-07-27 ENCOUNTER — APPOINTMENT (OUTPATIENT)
Dept: GERIATRICS | Facility: CLINIC | Age: 83
End: 2018-07-27
Payer: MEDICARE

## 2018-07-27 VITALS
HEART RATE: 82 BPM | SYSTOLIC BLOOD PRESSURE: 130 MMHG | TEMPERATURE: 98.1 F | WEIGHT: 115.2 LBS | BODY MASS INDEX: 21.77 KG/M2 | DIASTOLIC BLOOD PRESSURE: 70 MMHG | OXYGEN SATURATION: 95 %

## 2018-07-27 DIAGNOSIS — F03.91 UNSPECIFIED DEMENTIA WITH BEHAVIORAL DISTURBANCE: ICD-10-CM

## 2018-07-27 DIAGNOSIS — E03.9 HYPOTHYROIDISM, UNSPECIFIED: ICD-10-CM

## 2018-07-27 DIAGNOSIS — E53.8 DEFICIENCY OF OTHER SPECIFIED B GROUP VITAMINS: ICD-10-CM

## 2018-07-27 DIAGNOSIS — E78.5 HYPERLIPIDEMIA, UNSPECIFIED: ICD-10-CM

## 2018-07-27 DIAGNOSIS — I73.9 PERIPHERAL VASCULAR DISEASE, UNSPECIFIED: ICD-10-CM

## 2018-07-27 DIAGNOSIS — I10 ESSENTIAL (PRIMARY) HYPERTENSION: ICD-10-CM

## 2018-07-27 PROCEDURE — 99205 OFFICE O/P NEW HI 60 MIN: CPT | Mod: GC

## 2018-08-02 ENCOUNTER — MEDICATION RENEWAL (OUTPATIENT)
Age: 83
End: 2018-08-02

## 2018-08-02 RX ORDER — OXYBUTYNIN CHLORIDE 5 MG/1
5 TABLET ORAL TWICE DAILY
Qty: 60 | Refills: 5 | Status: ACTIVE | COMMUNITY
Start: 2018-08-02 | End: 1900-01-01

## 2018-08-10 ENCOUNTER — INPATIENT (INPATIENT)
Facility: HOSPITAL | Age: 83
LOS: 4 days | Discharge: NURSING, REHAB OR LONG-TERM CARE | DRG: 871 | End: 2018-08-15
Attending: HOSPITALIST | Admitting: HOSPITALIST
Payer: MEDICARE

## 2018-08-10 VITALS — WEIGHT: 149.91 LBS | HEART RATE: 153 BPM | OXYGEN SATURATION: 94 % | TEMPERATURE: 102 F | RESPIRATION RATE: 20 BRPM

## 2018-08-10 DIAGNOSIS — A41.9 SEPSIS, UNSPECIFIED ORGANISM: ICD-10-CM

## 2018-08-10 DIAGNOSIS — Z29.9 ENCOUNTER FOR PROPHYLACTIC MEASURES, UNSPECIFIED: ICD-10-CM

## 2018-08-10 DIAGNOSIS — N39.0 URINARY TRACT INFECTION, SITE NOT SPECIFIED: ICD-10-CM

## 2018-08-10 DIAGNOSIS — E03.9 HYPOTHYROIDISM, UNSPECIFIED: ICD-10-CM

## 2018-08-10 DIAGNOSIS — Z71.89 OTHER SPECIFIED COUNSELING: ICD-10-CM

## 2018-08-10 DIAGNOSIS — E87.1 HYPO-OSMOLALITY AND HYPONATREMIA: ICD-10-CM

## 2018-08-10 DIAGNOSIS — K21.9 GASTRO-ESOPHAGEAL REFLUX DISEASE WITHOUT ESOPHAGITIS: ICD-10-CM

## 2018-08-10 DIAGNOSIS — R41.82 ALTERED MENTAL STATUS, UNSPECIFIED: ICD-10-CM

## 2018-08-10 DIAGNOSIS — N17.9 ACUTE KIDNEY FAILURE, UNSPECIFIED: ICD-10-CM

## 2018-08-10 DIAGNOSIS — H26.9 UNSPECIFIED CATARACT: Chronic | ICD-10-CM

## 2018-08-10 DIAGNOSIS — Z90.49 ACQUIRED ABSENCE OF OTHER SPECIFIED PARTS OF DIGESTIVE TRACT: Chronic | ICD-10-CM

## 2018-08-10 DIAGNOSIS — I25.10 ATHEROSCLEROTIC HEART DISEASE OF NATIVE CORONARY ARTERY WITHOUT ANGINA PECTORIS: ICD-10-CM

## 2018-08-10 LAB
ALBUMIN SERPL ELPH-MCNC: 2.8 G/DL — LOW (ref 3.3–5)
ALP SERPL-CCNC: 88 U/L — SIGNIFICANT CHANGE UP (ref 40–120)
ALT FLD-CCNC: 11 U/L — LOW (ref 12–78)
ANION GAP SERPL CALC-SCNC: 11 MMOL/L — SIGNIFICANT CHANGE UP (ref 5–17)
APPEARANCE UR: CLEAR — SIGNIFICANT CHANGE UP
APTT BLD: 24.2 SEC — LOW (ref 27.5–37.4)
AST SERPL-CCNC: 15 U/L — SIGNIFICANT CHANGE UP (ref 15–37)
BASOPHILS # BLD AUTO: 0 K/UL — SIGNIFICANT CHANGE UP (ref 0–0.2)
BASOPHILS NFR BLD AUTO: 0 % — SIGNIFICANT CHANGE UP (ref 0–2)
BILIRUB SERPL-MCNC: 0.8 MG/DL — SIGNIFICANT CHANGE UP (ref 0.2–1.2)
BILIRUB UR-MCNC: NEGATIVE — SIGNIFICANT CHANGE UP
BUN SERPL-MCNC: 23 MG/DL — SIGNIFICANT CHANGE UP (ref 7–23)
CALCIUM SERPL-MCNC: 8.5 MG/DL — SIGNIFICANT CHANGE UP (ref 8.5–10.1)
CHLORIDE SERPL-SCNC: 96 MMOL/L — SIGNIFICANT CHANGE UP (ref 96–108)
CO2 SERPL-SCNC: 24 MMOL/L — SIGNIFICANT CHANGE UP (ref 22–31)
COLOR SPEC: YELLOW — SIGNIFICANT CHANGE UP
CREAT SERPL-MCNC: 1.4 MG/DL — HIGH (ref 0.5–1.3)
DIFF PNL FLD: ABNORMAL
EOSINOPHIL # BLD AUTO: 0 K/UL — SIGNIFICANT CHANGE UP (ref 0–0.5)
EOSINOPHIL NFR BLD AUTO: 0 % — SIGNIFICANT CHANGE UP (ref 0–6)
GLUCOSE SERPL-MCNC: 149 MG/DL — HIGH (ref 70–99)
GLUCOSE UR QL: NEGATIVE — SIGNIFICANT CHANGE UP
HCT VFR BLD CALC: 34.8 % — SIGNIFICANT CHANGE UP (ref 34.5–45)
HGB BLD-MCNC: 11.3 G/DL — LOW (ref 11.5–15.5)
INR BLD: 1.01 RATIO — SIGNIFICANT CHANGE UP (ref 0.88–1.16)
KETONES UR-MCNC: NEGATIVE — SIGNIFICANT CHANGE UP
LACTATE SERPL-SCNC: 1.2 MMOL/L — SIGNIFICANT CHANGE UP (ref 0.7–2)
LACTATE SERPL-SCNC: 2.5 MMOL/L — HIGH (ref 0.7–2)
LEUKOCYTE ESTERASE UR-ACNC: NEGATIVE — SIGNIFICANT CHANGE UP
LYMPHOCYTES # BLD AUTO: 0.75 K/UL — LOW (ref 1–3.3)
LYMPHOCYTES # BLD AUTO: 6 % — LOW (ref 13–44)
MCHC RBC-ENTMCNC: 26.5 PG — LOW (ref 27–34)
MCHC RBC-ENTMCNC: 32.5 GM/DL — SIGNIFICANT CHANGE UP (ref 32–36)
MCV RBC AUTO: 81.5 FL — SIGNIFICANT CHANGE UP (ref 80–100)
MONOCYTES # BLD AUTO: 0.5 K/UL — SIGNIFICANT CHANGE UP (ref 0–0.9)
MONOCYTES NFR BLD AUTO: 4 % — SIGNIFICANT CHANGE UP (ref 2–14)
NEUTROPHILS # BLD AUTO: 11.19 K/UL — HIGH (ref 1.8–7.4)
NEUTROPHILS NFR BLD AUTO: 90 % — HIGH (ref 43–77)
NITRITE UR-MCNC: POSITIVE
PH UR: 5 — SIGNIFICANT CHANGE UP (ref 5–8)
PLATELET # BLD AUTO: 180 K/UL — SIGNIFICANT CHANGE UP (ref 150–400)
POTASSIUM SERPL-MCNC: 3.9 MMOL/L — SIGNIFICANT CHANGE UP (ref 3.5–5.3)
POTASSIUM SERPL-SCNC: 3.9 MMOL/L — SIGNIFICANT CHANGE UP (ref 3.5–5.3)
PROT SERPL-MCNC: 7.2 G/DL — SIGNIFICANT CHANGE UP (ref 6–8.3)
PROT UR-MCNC: 75 MG/DL
PROTHROM AB SERPL-ACNC: 11 SEC — SIGNIFICANT CHANGE UP (ref 9.8–12.7)
RAPID RVP RESULT: SIGNIFICANT CHANGE UP
RBC # BLD: 4.27 M/UL — SIGNIFICANT CHANGE UP (ref 3.8–5.2)
RBC # FLD: 16.9 % — HIGH (ref 10.3–14.5)
SODIUM SERPL-SCNC: 131 MMOL/L — LOW (ref 135–145)
SP GR SPEC: 1.01 — SIGNIFICANT CHANGE UP (ref 1.01–1.02)
UROBILINOGEN FLD QL: NEGATIVE — SIGNIFICANT CHANGE UP
WBC # BLD: 12.43 K/UL — HIGH (ref 3.8–10.5)
WBC # FLD AUTO: 12.43 K/UL — HIGH (ref 3.8–10.5)

## 2018-08-10 PROCEDURE — 70450 CT HEAD/BRAIN W/O DYE: CPT | Mod: 26

## 2018-08-10 PROCEDURE — 99285 EMERGENCY DEPT VISIT HI MDM: CPT

## 2018-08-10 PROCEDURE — 99223 1ST HOSP IP/OBS HIGH 75: CPT | Mod: GC,AI

## 2018-08-10 PROCEDURE — 71045 X-RAY EXAM CHEST 1 VIEW: CPT | Mod: 26

## 2018-08-10 PROCEDURE — 93010 ELECTROCARDIOGRAM REPORT: CPT

## 2018-08-10 RX ORDER — HEPARIN SODIUM 5000 [USP'U]/ML
5000 INJECTION INTRAVENOUS; SUBCUTANEOUS EVERY 12 HOURS
Qty: 0 | Refills: 0 | Status: DISCONTINUED | OUTPATIENT
Start: 2018-08-10 | End: 2018-08-10

## 2018-08-10 RX ORDER — CEFTRIAXONE 500 MG/1
1 INJECTION, POWDER, FOR SOLUTION INTRAMUSCULAR; INTRAVENOUS ONCE
Qty: 0 | Refills: 0 | Status: COMPLETED | OUTPATIENT
Start: 2018-08-10 | End: 2018-08-10

## 2018-08-10 RX ORDER — LEVOTHYROXINE SODIUM 125 MCG
37.5 TABLET ORAL AT BEDTIME
Qty: 0 | Refills: 0 | Status: DISCONTINUED | OUTPATIENT
Start: 2018-08-10 | End: 2018-08-15

## 2018-08-10 RX ORDER — ACETAMINOPHEN 500 MG
650 TABLET ORAL ONCE
Qty: 0 | Refills: 0 | Status: COMPLETED | OUTPATIENT
Start: 2018-08-10 | End: 2018-08-10

## 2018-08-10 RX ORDER — VANCOMYCIN HCL 1 G
1000 VIAL (EA) INTRAVENOUS ONCE
Qty: 0 | Refills: 0 | Status: COMPLETED | OUTPATIENT
Start: 2018-08-10 | End: 2018-08-10

## 2018-08-10 RX ORDER — CEFTRIAXONE 500 MG/1
1 INJECTION, POWDER, FOR SOLUTION INTRAMUSCULAR; INTRAVENOUS EVERY 24 HOURS
Qty: 0 | Refills: 0 | Status: DISCONTINUED | OUTPATIENT
Start: 2018-08-11 | End: 2018-08-11

## 2018-08-10 RX ORDER — SODIUM CHLORIDE 9 MG/ML
1100 INJECTION INTRAMUSCULAR; INTRAVENOUS; SUBCUTANEOUS ONCE
Qty: 0 | Refills: 0 | Status: COMPLETED | OUTPATIENT
Start: 2018-08-10 | End: 2018-08-10

## 2018-08-10 RX ORDER — SODIUM CHLORIDE 9 MG/ML
1000 INJECTION INTRAMUSCULAR; INTRAVENOUS; SUBCUTANEOUS ONCE
Qty: 0 | Refills: 0 | Status: COMPLETED | OUTPATIENT
Start: 2018-08-10 | End: 2018-08-10

## 2018-08-10 RX ORDER — SODIUM CHLORIDE 9 MG/ML
1000 INJECTION INTRAMUSCULAR; INTRAVENOUS; SUBCUTANEOUS
Qty: 0 | Refills: 0 | Status: DISCONTINUED | OUTPATIENT
Start: 2018-08-10 | End: 2018-08-11

## 2018-08-10 RX ORDER — ACETAMINOPHEN 500 MG
650 TABLET ORAL EVERY 6 HOURS
Qty: 0 | Refills: 0 | Status: DISCONTINUED | OUTPATIENT
Start: 2018-08-10 | End: 2018-08-15

## 2018-08-10 RX ADMIN — Medication 650 MILLIGRAM(S): at 13:25

## 2018-08-10 RX ADMIN — SODIUM CHLORIDE 1100 MILLILITER(S): 9 INJECTION INTRAMUSCULAR; INTRAVENOUS; SUBCUTANEOUS at 15:30

## 2018-08-10 RX ADMIN — CEFTRIAXONE 100 GRAM(S): 500 INJECTION, POWDER, FOR SOLUTION INTRAMUSCULAR; INTRAVENOUS at 13:50

## 2018-08-10 RX ADMIN — CEFTRIAXONE 1 GRAM(S): 500 INJECTION, POWDER, FOR SOLUTION INTRAMUSCULAR; INTRAVENOUS at 14:20

## 2018-08-10 RX ADMIN — SODIUM CHLORIDE 1000 MILLILITER(S): 9 INJECTION INTRAMUSCULAR; INTRAVENOUS; SUBCUTANEOUS at 14:25

## 2018-08-10 RX ADMIN — Medication 1000 MILLIGRAM(S): at 18:30

## 2018-08-10 RX ADMIN — Medication 250 MILLIGRAM(S): at 14:25

## 2018-08-10 RX ADMIN — SODIUM CHLORIDE 1000 MILLILITER(S): 9 INJECTION INTRAMUSCULAR; INTRAVENOUS; SUBCUTANEOUS at 13:50

## 2018-08-10 RX ADMIN — SODIUM CHLORIDE 1100 MILLILITER(S): 9 INJECTION INTRAMUSCULAR; INTRAVENOUS; SUBCUTANEOUS at 14:25

## 2018-08-10 NOTE — H&P ADULT - PMH
Arthritis    Damian esophagus    CAD (coronary artery disease)    Dementia    GERD (gastroesophageal reflux disease)    High cholesterol    Hypertension    Hypothyroid    TIA (transient ischemic attack)

## 2018-08-10 NOTE — ED ADULT NURSE NOTE - OBJECTIVE STATEMENT
Pt to ED via ambulance sent from urgent care for rapid heart rate.  Pt is extremely agitated and confused, pulling off all medical equipment, mumbling incoherently.  After family arrived, they stated pt has been coughing and weak for about a week and they brought her to urgent care.  At urgent care she was found to be in rapid afib and sent to ED.  Family states she has had runny nose, cough, weakness and increased confusion.

## 2018-08-10 NOTE — H&P ADULT - ASSESSMENT
89F with PMH of advanced dementia, HTN, HLD, TIA, GERD, Barretts esophagus, CAD s/p (2 stents; 2002, 2012) admitted for sepsis 2/2 UTI, AMS, SARA.

## 2018-08-10 NOTE — H&P ADULT - NSHPPHYSICALEXAM_GEN_ALL_CORE
Weight (kg): 68 (08-10 @ 13:20)  Vital Signs Last 24 Hrs  T(C): 38.7 (10 Aug 2018 13:20), Max: 38.7 (10 Aug 2018 13:20)  T(F): 101.7 (10 Aug 2018 13:20), Max: 101.7 (10 Aug 2018 13:20)  HR: 93 (10 Aug 2018 14:30) (93 - 153)  BP: 144/70 (10 Aug 2018 14:30) (144/70 - 159/91)  BP(mean): --  RR: 18 (10 Aug 2018 14:30) (17 - 20)  SpO2: 97% (10 Aug 2018 14:30) (94% - 97%)  [ X] room air   [ ] 02    PHYSICAL EXAM: difficult to obtain proper physical as patient pushing me away  GENERAL:  agitated elderly female, moaning, pulling lines  HEAD:  AT/NC  ENMT: EOMI, PERRL, moist mucus membranes  NECK:  supple  NERVOUS SYSTEM:  Alert, doesn't follow simple commands, doesn't respond to simple questions, moaning, grossly moves all extremities  CHEST/LUNG: poor inspiratory effort, grossly CTA B/L anteriorly, no wheezing  HEART:  Regular rate and rhythm, No murmurs, rubs, or gallops  ABDOMEN:  soft, suprapubic tenderness to palpation, nondistended, positive bowel sounds    EXTREMITIES: No clubbing, cyanosis or edema   SKIN: mild venous stasis skin changes

## 2018-08-10 NOTE — ED PROVIDER NOTE - ATTENDING CONTRIBUTION TO CARE
89 y/o F brought in by EMS from  for AMS, tachycardia r/o Sepsis.  pt with sepsis secondary to UTI.  abx, IVFs, monitor admit

## 2018-08-10 NOTE — H&P ADULT - NSHPSOCIALHISTORY_GEN_ALL_CORE
Lives with: daughters   unable to obtain social hx given mental status    Advanced Directives: HCP: Daughter Mary, DNR/DNI

## 2018-08-10 NOTE — ED PROVIDER NOTE - PROGRESS NOTE DETAILS
without intervention will continue to evaluate  without intervention will continue to evaluate. HR 97, pt stable. daughter que at bedside advised pt has had cough for 3 days with discolored sputum (unknown). HR 97, pt stable. daughter que at bedside advised pt has had cough for 3 days with discolored sputum (unknown). pt has hx of uti with ams lactate elevated, will re-eval after fluids. consulted hospitalist dr reyes who will admit pt. discussed admission with daughter who agrees. I have re-evaluated the patient's fluid status and reviewed the vital sign after the fluid bolus.  Clinical evaluation demonstrates an appropriate response to fluid resuscitation.  demented, sleeping SKIN: warm, dry cap refill < 2 seconds RESP: LUNGS CTABL CARDIAC: S1S2. NO MURMURS

## 2018-08-10 NOTE — ED PROVIDER NOTE - OBJECTIVE STATEMENT
pt is a 89yo female bib ems with only known pmhx of hypothyroid with AMS. [place] [facility] [place] no family at bedside at this time. pt is a 89yo female bib ems with only known pmhx of hypothyroid with AMS. [place] [facility] [place] no family at bedside at this time.  pmd: spokes

## 2018-08-10 NOTE — ED ADULT NURSE NOTE - NSIMPLEMENTINTERV_GEN_ALL_ED
Implemented All Fall with Harm Risk Interventions:  [place] to call system. Call bell, personal items and telephone within reach. Instruct patient to call for assistance. Room bathroom lighting operational. Non-slip footwear when patient is off stretcher. Physically safe environment: no spills, clutter or unnecessary equipment. Stretcher in lowest position, wheels locked, appropriate side rails in place. Provide visual cue, wrist band, yellow gown, etc. Monitor gait and stability. Monitor for mental status changes and reorient to person, place, and time. Review medications for side effects contributing to fall risk. Reinforce activity limits and safety measures with patient and family. Provide visual clues: red socks.

## 2018-08-10 NOTE — H&P ADULT - PROBLEM SELECTOR PLAN 5
Daughter reports mild CKD hx, unaware of Cr baseline. Likely due to dehydration  -s/p 2100mL NS bolus in ED, start IVF 75cc/hr x 10 hours  -Follow up AM BUN/Cr

## 2018-08-10 NOTE — H&P ADULT - PROBLEM SELECTOR PLAN 4
likely due to dehydration  -s/p 2100mL NS bolus in ED, start IVF 75cc/hr x 10 hours  -Follow up AM labs

## 2018-08-10 NOTE — H&P ADULT - PROBLEM SELECTOR PLAN 3
multifactorial likely due to advanced dementia v sepsis  -Patient difficult to redirect and occasionally agitated, discussed with daughter need for constant observation and potential need for Haldol v Zyprexa, daughter aware and in agreement.  -Follow up CT head, pt with hx of recent fall multifactorial likely due to advanced dementia v sepsis  R/O encephalopathy  -Patient difficult to redirect and occasionally agitated, discussed with daughter need for constant observation and potential need for Haldol v Zyprexa, daughter aware and in agreement.  -Follow up CT head, pt with hx of recent fall

## 2018-08-10 NOTE — H&P ADULT - PROBLEM SELECTOR PLAN 6
chronic, stable  -Hold PPI  -Will hold oral medications until patient able to safely swallow as daughters report decreased appetite and difficulty swallowing/choking  -NPO until speech and swallow evaluation

## 2018-08-10 NOTE — H&P ADULT - PROBLEM SELECTOR PLAN 8
chronic, stable  -Start IV Synthroid 37.5 (2:1 Oral:IV form, patient takes oral Synthroid 75mcg at home) Likely will refuse medication given AMS. Plan to transition to oral medications as mental status improves

## 2018-08-10 NOTE — H&P ADULT - HISTORY OF PRESENT ILLNESS
89F with PMH of advanced dementia, HTN, HLD, TIA, GERD, Barretts esophagus, CAD s/p (2 stents; 2002, 2012) presents from Urgent Care. History obtained from Daughters, Mary and Carey, over the phone. Patient unable to provide any history; removed IV access and unable to respond to simple questions or provider her name. Per daughters, a few nights ago patient slipped and fell on a water bottle, EMS was called to lift patient back to bed. At the t a few days ago and daughter was unable to get 89F with PMH of advanced dementia, HTN, HLD, TIA, GERD, Barretts esophagus, CAD s/p (2 stents; , ) presents from Urgent Care. History obtained from Daughters, Mary and Carey, over the phone. Patient unable to provide any history; removed IV access and unable to respond to simple questions or provider her name. Per daughter, a few nights ago patient slipped and fell on a water bottle, EMS was called, family refused transfer to ED and patient was lifted back to bed. Daughter reports patient is constantly in the bathroom, "either sleeping or peeing." Daughters reports patient has hx of chronic UTIs "because she touches herself." Was recently prescribed Oxybutynin by PCP but only took medication for 4 days and discontinued on Tuesday because lack of improvement of symptoms of urgency. For the past three days, patient has had increased moaning (moans at baseline), sneezing, cough with productive sputum and not answering simple questions or following simple commands. Also reports patient has been sleeping a lot more. They went to an Urgent Care and report patient became severely agitated with elevated HR. Daughter reports patient has anxiety about hospitalization causing increased HR. At baseline patient is A&Ox2 (knows name, , location, surrounds, doesn't know year). Patient ambulates unsteady and tries to "run", refusing to use a cane or walker. Has two aides for 9 hours, 6 days ago. Of note, per daughter patient was recently admitted to [facility] on 2018 for subdural hematoma, and several rib fractures after a fall. Patient went to rehab and returned home 2018, and symptoms of dementia have worsened. Admit to decreased appetite and decreased fluid intake. Daughter, Mary, HCP, report patient is a DNR/DNI.     In the ED: rectal 101.7,  -> 104, /91, RR 20, SpO2 94% RA. WBC 12.43, lactate 2.5, Na 131, Cr 1.4, UA positive for nitrites. Chest Xray: no acute findings. EKG sinus tachycardia at 145. HR improved to 104 without intervention. Received IV Vancomycin x1, IV Rocephin x1, 2100mL NS bolus, Tylenol.

## 2018-08-10 NOTE — H&P ADULT - PROBLEM SELECTOR PLAN 1
Meets SIRS criteria with fever, tachycardia, elevated WBC, lactate 2.5, altered mental status, source likely UTI (UA positive nitrites)  -Admit to medicine  -s/p IV Rocephin x1, IV Vancomycin x1 in ED; continue IV Rocephin  -s/p 2100mL NS bolus in ED; patient pulled IV during transfusion.   -Follow up repeat lactate  -Start IVF maintenance at 75cc/hr x 10 hours. Monitor for fluid overload Meets SIRS criteria with fever, tachycardia, elevated WBC, lactate 2.5, altered mental status, source likely UTI (UA positive nitrites)  -Admit to medicine  -s/p IV Rocephin x1, IV Vancomycin x1 in ED; continue IV Rocephin  -s/p 2100mL NS bolus in ED; patient pulled IV during transfusion.   -Follow up repeat lactate  -Start IVF maintenance at 75cc/hr x 10 hours. Monitor for fluid overload  -Tylenol PRN for fever  -Follow up blood cultures x2, urine culture  -Follow up RVP Meets SIRS criteria with fever, tachycardia, elevated WBC, lactate 2.5, altered mental status, source likely UTI (UA positive nitrites)  -Admit to medicine  -s/p IV Rocephin x1, IV Vancomycin x1 in ED; no strong risk factors for MRSA. continue IV Rocephin  -s/p 2100mL NS bolus in ED; patient pulled IV during transfusion.   -Follow up repeat lactate  -Start IVF maintenance at 75cc/hr x 10 hours. Monitor for fluid overload  -Tylenol PRN for fever  -Follow up blood cultures x2, urine culture  -Follow up RVP

## 2018-08-10 NOTE — H&P ADULT - PROBLEM SELECTOR PLAN 10
IMPROVE VTE Individual Risk Assessment          RISK                                                          Points  [  ] Previous VTE                                                3  [  ] Thrombophilia                                             2  [  ] Lower limb paralysis                                   2        (unable to hold up >15 seconds)    [  ] Current Cancer                                             2         (within 6 months)  [  ] Immobilization > 24 hrs                              1  [  ] ICU/CCU stay > 24 hours                             1  [x  ] Age > 60                                                         1    IMPROVE VTE Score: 1  DVT prophylaxis: Lovenox 30mg daily (renal dosing)  Aspiration Precautions  Fall risk IMPROVE VTE Individual Risk Assessment          RISK                                                          Points  [  ] Previous VTE                                                3  [  ] Thrombophilia                                             2  [  ] Lower limb paralysis                                   2        (unable to hold up >15 seconds)    [  ] Current Cancer                                             2         (within 6 months)  [  ] Immobilization > 24 hrs                              1  [  ] ICU/CCU stay > 24 hours                             1  [x  ] Age > 60                                                         1    IMPROVE VTE Score: 1  DVT prophylaxis: Heparin SQ q 12  Physical Therapy once mental status improves, family requesting no rehab  Aspiration Precautions  Fall risk IMPROVE VTE Individual Risk Assessment          RISK                                                          Points  [  ] Previous VTE                                                3  [  ] Thrombophilia                                             2  [  ] Lower limb paralysis                                   2        (unable to hold up >15 seconds)    [  ] Current Cancer                                             2         (within 6 months)  [  ] Immobilization > 24 hrs                              1  [  ] ICU/CCU stay > 24 hours                             1  [x  ] Age > 60                                                         1    IMPROVE VTE Score: 1. Padua score 1  DVT prophylaxis: SCDs pending head CT  Physical Therapy once mental status improves, family requesting no rehab  Aspiration Precautions  Fall risk

## 2018-08-11 DIAGNOSIS — N10 ACUTE PYELONEPHRITIS: ICD-10-CM

## 2018-08-11 DIAGNOSIS — F03.90 UNSPECIFIED DEMENTIA WITHOUT BEHAVIORAL DISTURBANCE: ICD-10-CM

## 2018-08-11 DIAGNOSIS — R78.81 BACTEREMIA: ICD-10-CM

## 2018-08-11 LAB
-  STREPTOCOCCUS SP. (NOT GRP A, B OR S PNEUMONIAE): SIGNIFICANT CHANGE UP
ANION GAP SERPL CALC-SCNC: 12 MMOL/L — SIGNIFICANT CHANGE UP (ref 5–17)
ANION GAP SERPL CALC-SCNC: 8 MMOL/L — SIGNIFICANT CHANGE UP (ref 5–17)
BUN SERPL-MCNC: 18 MG/DL — SIGNIFICANT CHANGE UP (ref 7–23)
BUN SERPL-MCNC: 18 MG/DL — SIGNIFICANT CHANGE UP (ref 7–23)
CALCIUM SERPL-MCNC: 7.8 MG/DL — LOW (ref 8.5–10.1)
CALCIUM SERPL-MCNC: 8 MG/DL — LOW (ref 8.5–10.1)
CHLORIDE SERPL-SCNC: 103 MMOL/L — SIGNIFICANT CHANGE UP (ref 96–108)
CHLORIDE SERPL-SCNC: 103 MMOL/L — SIGNIFICANT CHANGE UP (ref 96–108)
CO2 SERPL-SCNC: 22 MMOL/L — SIGNIFICANT CHANGE UP (ref 22–31)
CO2 SERPL-SCNC: 24 MMOL/L — SIGNIFICANT CHANGE UP (ref 22–31)
CREAT SERPL-MCNC: 0.98 MG/DL — SIGNIFICANT CHANGE UP (ref 0.5–1.3)
CREAT SERPL-MCNC: 1.1 MG/DL — SIGNIFICANT CHANGE UP (ref 0.5–1.3)
GLUCOSE SERPL-MCNC: 102 MG/DL — HIGH (ref 70–99)
GLUCOSE SERPL-MCNC: 98 MG/DL — SIGNIFICANT CHANGE UP (ref 70–99)
GRAM STN FLD: SIGNIFICANT CHANGE UP
GRAM STN FLD: SIGNIFICANT CHANGE UP
HCT VFR BLD CALC: 31 % — LOW (ref 34.5–45)
HGB BLD-MCNC: 10.1 G/DL — LOW (ref 11.5–15.5)
MCHC RBC-ENTMCNC: 26.3 PG — LOW (ref 27–34)
MCHC RBC-ENTMCNC: 32.6 GM/DL — SIGNIFICANT CHANGE UP (ref 32–36)
MCV RBC AUTO: 80.7 FL — SIGNIFICANT CHANGE UP (ref 80–100)
METHOD TYPE: SIGNIFICANT CHANGE UP
NRBC # BLD: 0 /100 WBCS — SIGNIFICANT CHANGE UP (ref 0–0)
PLATELET # BLD AUTO: 155 K/UL — SIGNIFICANT CHANGE UP (ref 150–400)
POTASSIUM SERPL-MCNC: 3.2 MMOL/L — LOW (ref 3.5–5.3)
POTASSIUM SERPL-MCNC: 3.3 MMOL/L — LOW (ref 3.5–5.3)
POTASSIUM SERPL-SCNC: 3.2 MMOL/L — LOW (ref 3.5–5.3)
POTASSIUM SERPL-SCNC: 3.3 MMOL/L — LOW (ref 3.5–5.3)
RBC # BLD: 3.84 M/UL — SIGNIFICANT CHANGE UP (ref 3.8–5.2)
RBC # FLD: 16.4 % — HIGH (ref 10.3–14.5)
SODIUM SERPL-SCNC: 135 MMOL/L — SIGNIFICANT CHANGE UP (ref 135–145)
SODIUM SERPL-SCNC: 137 MMOL/L — SIGNIFICANT CHANGE UP (ref 135–145)
WBC # BLD: 9.03 K/UL — SIGNIFICANT CHANGE UP (ref 3.8–10.5)
WBC # FLD AUTO: 9.03 K/UL — SIGNIFICANT CHANGE UP (ref 3.8–10.5)

## 2018-08-11 PROCEDURE — 99223 1ST HOSP IP/OBS HIGH 75: CPT

## 2018-08-11 PROCEDURE — 99233 SBSQ HOSP IP/OBS HIGH 50: CPT

## 2018-08-11 PROCEDURE — 93010 ELECTROCARDIOGRAM REPORT: CPT

## 2018-08-11 RX ORDER — AMPICILLIN SODIUM AND SULBACTAM SODIUM 250; 125 MG/ML; MG/ML
INJECTION, POWDER, FOR SUSPENSION INTRAMUSCULAR; INTRAVENOUS
Qty: 0 | Refills: 0 | Status: DISCONTINUED | OUTPATIENT
Start: 2018-08-11 | End: 2018-08-13

## 2018-08-11 RX ORDER — METOPROLOL TARTRATE 50 MG
25 TABLET ORAL
Qty: 0 | Refills: 0 | Status: DISCONTINUED | OUTPATIENT
Start: 2018-08-11 | End: 2018-08-13

## 2018-08-11 RX ORDER — METOPROLOL TARTRATE 50 MG
5 TABLET ORAL ONCE
Qty: 0 | Refills: 0 | Status: COMPLETED | OUTPATIENT
Start: 2018-08-11 | End: 2018-08-11

## 2018-08-11 RX ORDER — POTASSIUM CHLORIDE 20 MEQ
10 PACKET (EA) ORAL
Qty: 0 | Refills: 0 | Status: COMPLETED | OUTPATIENT
Start: 2018-08-11 | End: 2018-08-11

## 2018-08-11 RX ORDER — SODIUM CHLORIDE 9 MG/ML
1000 INJECTION, SOLUTION INTRAVENOUS
Qty: 0 | Refills: 0 | Status: DISCONTINUED | OUTPATIENT
Start: 2018-08-11 | End: 2018-08-12

## 2018-08-11 RX ORDER — ENOXAPARIN SODIUM 100 MG/ML
70 INJECTION SUBCUTANEOUS EVERY 12 HOURS
Qty: 0 | Refills: 0 | Status: DISCONTINUED | OUTPATIENT
Start: 2018-08-11 | End: 2018-08-15

## 2018-08-11 RX ORDER — AMPICILLIN SODIUM AND SULBACTAM SODIUM 250; 125 MG/ML; MG/ML
3 INJECTION, POWDER, FOR SUSPENSION INTRAMUSCULAR; INTRAVENOUS EVERY 6 HOURS
Qty: 0 | Refills: 0 | Status: DISCONTINUED | OUTPATIENT
Start: 2018-08-11 | End: 2018-08-13

## 2018-08-11 RX ORDER — AMPICILLIN SODIUM AND SULBACTAM SODIUM 250; 125 MG/ML; MG/ML
3 INJECTION, POWDER, FOR SUSPENSION INTRAMUSCULAR; INTRAVENOUS ONCE
Qty: 0 | Refills: 0 | Status: COMPLETED | OUTPATIENT
Start: 2018-08-11 | End: 2018-08-11

## 2018-08-11 RX ADMIN — AMPICILLIN SODIUM AND SULBACTAM SODIUM 200 GRAM(S): 250; 125 INJECTION, POWDER, FOR SUSPENSION INTRAMUSCULAR; INTRAVENOUS at 13:18

## 2018-08-11 RX ADMIN — AMPICILLIN SODIUM AND SULBACTAM SODIUM 200 GRAM(S): 250; 125 INJECTION, POWDER, FOR SUSPENSION INTRAMUSCULAR; INTRAVENOUS at 18:22

## 2018-08-11 RX ADMIN — Medication 25 MILLIGRAM(S): at 17:19

## 2018-08-11 RX ADMIN — Medication 25 MILLIGRAM(S): at 05:52

## 2018-08-11 RX ADMIN — Medication 37.5 MICROGRAM(S): at 00:04

## 2018-08-11 RX ADMIN — AMPICILLIN SODIUM AND SULBACTAM SODIUM 200 GRAM(S): 250; 125 INJECTION, POWDER, FOR SUSPENSION INTRAMUSCULAR; INTRAVENOUS at 23:27

## 2018-08-11 RX ADMIN — ENOXAPARIN SODIUM 70 MILLIGRAM(S): 100 INJECTION SUBCUTANEOUS at 17:20

## 2018-08-11 RX ADMIN — Medication 650 MILLIGRAM(S): at 23:19

## 2018-08-11 RX ADMIN — SODIUM CHLORIDE 75 MILLILITER(S): 9 INJECTION INTRAMUSCULAR; INTRAVENOUS; SUBCUTANEOUS at 01:40

## 2018-08-11 RX ADMIN — Medication 100 MILLIEQUIVALENT(S): at 10:37

## 2018-08-11 RX ADMIN — Medication 100 MILLIEQUIVALENT(S): at 11:37

## 2018-08-11 RX ADMIN — Medication 100 MILLIEQUIVALENT(S): at 12:38

## 2018-08-11 RX ADMIN — Medication 5 MILLIGRAM(S): at 01:02

## 2018-08-11 RX ADMIN — Medication 5 MILLIGRAM(S): at 23:21

## 2018-08-11 RX ADMIN — SODIUM CHLORIDE 65 MILLILITER(S): 9 INJECTION, SOLUTION INTRAVENOUS at 10:36

## 2018-08-11 RX ADMIN — ENOXAPARIN SODIUM 70 MILLIGRAM(S): 100 INJECTION SUBCUTANEOUS at 05:51

## 2018-08-11 RX ADMIN — Medication 37.5 MICROGRAM(S): at 22:17

## 2018-08-11 NOTE — CONSULT NOTE ADULT - PROBLEM SELECTOR RECOMMENDATION 9
Presentation suggesting urinary source and with positive blood cultures and dropping wbc will adj abx to unasyn and further recs to follow based on micro ID

## 2018-08-11 NOTE — PROGRESS NOTE ADULT - ASSESSMENT
89F with PMH of advanced dementia, HTN, HLD, TIA, GERD, Barretts esophagus, CAD s/p (2 stents; 2002, 2012) admitted for sepsis 2/2 UTI, gram + bacteremia,  AMS likely encephalopathy,  SARA.

## 2018-08-11 NOTE — PROGRESS NOTE ADULT - PROBLEM SELECTOR PLAN 1
- source likely UTI (UA positive nitrites)  -s/p IV Rocephin x1, IV Vancomycin x1 in ED  - continue IV Rocephin  - Continue IV fluids   -Tylenol PRN for fever  -RVP negative  -Blood cultures +  -ID Dr. Raymond called

## 2018-08-11 NOTE — CONSULT NOTE ADULT - ASSESSMENT
89F with PMH of advanced dementia, HTN, HLD, TIA, GERD, Barretts esophagus, CAD s/p (2 stents; 2002, 2012) presents from Urgent Care with urinary symptoms and bacteremia now noted since admission
Ms. Maria is being treated for possible sepsis with broad-spectrum antibiotics. Her worsening mental status is suggested to be due to her metabolic derangement. Review of her telemetry reveals sinus rhythm with frequent atrial premature contractions. Review of her electrocardiography examination overnight does suggest short periods of atrial fibrillation possibly in part secondary to acute metabolic derangements. In any event conservative therapy is warranted and she is not likely to be a candidate for prolonged anticoagulation despite her elevated CHADS score.    Recommend    Continue telemetry  On full dose enoxaparin at present  Continue beta blocker therapy and follow heart rate and rhythm closely  Antibiotics per medicine  Echocardiography to assess for any underlying structural heart disease  Further management can be dependent on her clinical course

## 2018-08-11 NOTE — PROGRESS NOTE ADULT - ATTENDING COMMENTS
F/u ID consult  1:1 observation for safety  Palliative care for advance directives  Prognosis is guarded. F/u ID consult  1:1 observation for safety  Prognosis is guarded. F/u ID consult  1:1 observation for safety  Prognosis is guarded.  D/W with daughter Eliana at length. All questions answered

## 2018-08-11 NOTE — CHART NOTE - NSCHARTNOTEFT_GEN_A_CORE
Called by RN to assess patient w/ tachycardia.  Patient seen and examined at bedside.  Agitated, demented at baseline.  Patient -170's on monitor.    Unable to obtain ROS 2/2 patient dementia status.    Vital Signs Last 24 Hrs  T(C): 36.8 (10 Aug 2018 21:34), Max: 38.7 (10 Aug 2018 13:20)  T(F): 98.2 (10 Aug 2018 21:34), Max: 101.7 (10 Aug 2018 13:20)  HR: 126 (10 Aug 2018 21:34) (93 - 153)  BP: 169/99 (10 Aug 2018 21:34) (144/70 - 169/99)  RR: 18 (10 Aug 2018 20:00) (17 - 20)  SpO2: 97% (10 Aug 2018 20:00) (94% - 97%)    Physical Exam:  General: frail, elderly in acute distress  HEENT: Normocephallic Atraumatic, PERRLA, EOMI bl, moist mucous membranes   Neck: Supple, nontender, no mass  Neurology: AA&Ox0, CN II-XII grossly intact, sensation intact: unable to follow commands  Respiratory: Clear To Auscultation B/L, No Wheezes, rhonchi or rales  CV: tachycardic irregularly irregular Rhythm, +S1/S2, no murmurs, rubs or gallops  Abdominal: Soft, Non-Tender, Non-Distended +Bowel Soundsx4      A/P:  89F with PMH of advanced dementia, HTN, HLD, TIA, GERD, Barretts esophagus, CAD s/p (2 stents; 2002, 2012) presents from Urgent Care; admitted w/ sepsis, now in a. fib with RVR.  - stat EKG: a. fib.  new onset: /90  - Lopressor 5mg IVP stat: HR responded 80-90's  - Metoprolol Tartrate 25 mg PO q12  - Lovenox 70mg IVP q12h  - Cardio consult in AM corry/ [facility] Called by RN to assess patient w/ tachycardia.  Patient seen and examined at bedside.  Agitated, demented at baseline.  Patient -170's on monitor.    Unable to obtain ROS 2/2 patient dementia status.    Vital Signs Last 24 Hrs  T(C): 36.8 (10 Aug 2018 21:34), Max: 38.7 (10 Aug 2018 13:20)  T(F): 98.2 (10 Aug 2018 21:34), Max: 101.7 (10 Aug 2018 13:20)  HR: 126 (10 Aug 2018 21:34) (93 - 153)  BP: 169/99 (10 Aug 2018 21:34) (144/70 - 169/99)  RR: 18 (10 Aug 2018 20:00) (17 - 20)  SpO2: 97% (10 Aug 2018 20:00) (94% - 97%)    Physical Exam:  General: frail, elderly in acute distress  HEENT: Normocephallic Atraumatic, PERRLA, EOMI bl, moist mucous membranes   Neck: Supple, nontender, no mass  Neurology: AA&Ox0, CN II-XII grossly intact, sensation intact: unable to follow commands  Respiratory: Clear To Auscultation B/L, No Wheezes, rhonchi or rales  CV: tachycardic irregularly irregular Rhythm, +S1/S2, no murmurs, rubs or gallops  Abdominal: Soft, Non-Tender, Non-Distended +Bowel Soundsx4      A/P:  89F with PMH of advanced dementia, HTN, HLD, TIA, GERD, Barretts esophagus, CAD s/p (2 stents; 2002, 2012) presents from Urgent Care; admitted w/ sepsis, now in a. fib with RVR.  - stat EKG: a. fib.  new onset: /90  - Lopressor 5mg IVP stat: HR responded 80-90's  - Metoprolol Tartrate 25 mg PO q12  - Lovenox 70mg IVP q12h  - Cardio consult in AM w/ [facility]  - Plan discussed w/ Dr. Chin who agrees w/ above

## 2018-08-11 NOTE — PROGRESS NOTE ADULT - PROBLEM SELECTOR PLAN 3
multifactorial likely due to advanced dementia v sepsis  R/O encephalopathy  -Patient difficult to redirect and occasionally agitated, discussed with daughter need for constant observation and potential need for Haldol v Zyprexa, daughter aware and in agreement.  - CT head negative  -Neuro consult Dr. KELLY Mg called  -Might need Psych consult??  -1:1 observation for safety

## 2018-08-11 NOTE — CONSULT NOTE ADULT - SUBJECTIVE AND OBJECTIVE BOX
HPI:  89F with PMH of advanced dementia, HTN, HLD, TIA, GERD, Barretts esophagus, CAD s/p (2 stents; , ) presents from Urgent Care. History obtained from Daughters, Mary and Carey, over the phone. Patient unable to provide any history; removed IV access and unable to respond to simple questions or provider her name. Per daughter, a few nights ago patient slipped and fell on a water bottle, EMS was called, family refused transfer to ED and patient was lifted back to bed. Daughter reports patient is constantly in the bathroom, "either sleeping or peeing." Daughters reports patient has hx of chronic UTIs "because she touches herself." Was recently prescribed Oxybutynin by PCP but only took medication for 4 days and discontinued on Tuesday because lack of improvement of symptoms of urgency. For the past three days, patient has had increased moaning (moans at baseline), sneezing, cough with productive sputum and not answering simple questions or following simple commands. Also reports patient has been sleeping a lot more. They went to an Urgent Care and report patient became severely agitated with elevated HR. Daughter reports patient has anxiety about hospitalization causing increased HR. At baseline patient is A&Ox2 (knows name, , location, surrounds, doesn't know year). Patient ambulates unsteady and tries to "run", refusing to use a cane or walker. Has two aides for 9 hours, 6 days ago. Of note, per daughter patient was recently admitted to [facility] on 2018 for subdural hematoma, and several rib fractures after a fall. Patient went to rehab and returned home 2018, and symptoms of dementia have worsened. Admit to decreased appetite and decreased fluid intake. Daughter, Mary, HCP, report patient is a DNR/DNI.     In the ED: rectal 101.7,  -> 104, /91, RR 20, SpO2 94% RA. WBC 12.43, lactate 2.5, Na 131, Cr 1.4, UA positive for nitrites. Chest Xray: no acute findings. EKG sinus tachycardia at 145. HR improved to 104 without intervention. Received IV Vancomycin x1, IV Rocephin x1, 2100mL NS bolus, Tylenol. (10 Aug 2018 15:35)    We are called after report of positive blood cultures and when seen patient is still very limited verbally and does not expand on history.      PAST MEDICAL & SURGICAL HISTORY:  CAD (coronary artery disease)  TIA (transient ischemic attack)  GERD (gastroesophageal reflux disease)  Damian esophagus  Arthritis  Hypertension  High cholesterol  Dementia  Hypothyroid  Cataract  S/P cholecystectomy      Antimicrobials  cefTRIAXone   IVPB 1 Gram(s) IV Intermittent every 24 hours      Immunological      Other  acetaminophen  Suppository 650 milliGRAM(s) Rectal every 6 hours PRN  dextrose 5% + sodium chloride 0.9%. 1000 milliLiter(s) IV Continuous <Continuous>  enoxaparin Injectable 70 milliGRAM(s) SubCutaneous every 12 hours  levothyroxine Injectable 37.5 MICROGram(s) IV Push at bedtime  metoprolol tartrate 25 milliGRAM(s) Oral two times a day  potassium chloride  10 mEq/100 mL IVPB 10 milliEquivalent(s) IV Intermittent every 1 hour      Allergies    No Known Allergies    Intolerances    SOCIAL HISTORY: no toxic habits    FAMILY HISTORY:  No pertinent family history in first degree relatives      ROS:    limited    Vital Signs Last 24 Hrs  T(C): 37.4 (11 Aug 2018 07:52), Max: 38.7 (10 Aug 2018 13:20)  T(F): 99.4 (11 Aug 2018 07:52), Max: 101.7 (10 Aug 2018 13:20)  HR: 110 (11 Aug 2018 07:52) (93 - 153)  BP: 155/80 (11 Aug 2018 07:52) (144/70 - 169/99)  BP(mean): --  RR: 17 (11 Aug 2018 07:52) (17 - 20)  SpO2: 94% (11 Aug 2018 07:52) (94% - 97%)    PE:  WDWN in no distress  HEENT:  NC, PERRL, sclerae anicteric, conjunctivae clear, EOMI.  Sinuses nontender, no nasal exudate.  No buccal or pharyngeal lesions, erythema or exudate  Neck:  Supple, no adenopathy  Lungs:  No accessory muscle use, bilaterally clear to auscultation  Cor:  RRR, S1, S2, no murmur appreciated  Abd:  Symmetric, normoactive BS.  Soft, warm and tender in suprapubic area, no masses, guarding or rebound.  Liver and spleen not enlarged  Extrem:  No cyanosis or edema  Skin:  No rashes.  Neuro: limited    LABS:                        10.1   9.03  )-----------( 155      ( 11 Aug 2018 05:52 )             31.0       WBC Count: 9.03 K/uL (18 @ 05:52)  WBC Count: 12.43 K/uL (08-10-18 @ 13:54)          137  |  103  |  18  ----------------------------<  102<H>  3.2<L>   |  22  |  0.98    Ca    8.0<L>      11 Aug 2018 05:52    TPro  7.2  /  Alb  2.8<L>  /  TBili  0.8  /  DBili  x   /  AST  15  /  ALT  11<L>  /  AlkPhos  88  08-10      Creatinine, Serum: 0.98 mg/dL (18 @ 05:52)  Creatinine, Serum: 1.10 mg/dL (18 @ 02:15)  Creatinine, Serum: 1.40 mg/dL (08-10-18 @ 13:54)      Urinalysis Basic - ( 10 Aug 2018 13:54 )    Color: Yellow / Appearance: Clear / S.015 / pH: x  Gluc: x / Ketone: Negative  / Bili: Negative / Urobili: Negative   Blood: x / Protein: 75 mg/dL / Nitrite: Positive   Leuk Esterase: Negative / RBC: x / WBC 0-2   Sq Epi: x / Non Sq Epi: Few / Bacteria: TNTC        MICROBIOLOGY:  Culture Results:   Growth in aerobic bottle: Gram positive cocci in pairs          RADIOLOGY & ADDITIONAL STUDIES:    --< from: Xray Chest 1 View-PORTABLE IMMEDIATE (08.10.18 @ 14:32) >    EXAM:  XR CHEST PORTABLE IMMED 1V                            PROCEDURE DATE:  08/10/2018          INTERPRETATION:  Clinical information: Sepsis.    Technique: Frontal view of the chest.    Comparison: None available.    Findings: There is suboptimal inspiratory result which causes crowding of   the vascular structures and limits evaluation of the lung parenchyma.    There is elevation of the right hemidiaphragm.    The lungs are grossly clear. The heart size is at the upper limits of   normal. There are mild multilevel degenerative changes of the thoracic   spine.    IMPRESSION: No acute findings.
[facility] Consultation    CHIEF COMPLAINT: Patient is a 89y old  Female who presents with a chief complaint of sepsis 2/2 UTI (10 Aug 2018 15:35)      HPI:  89F with PMH of advanced dementia, HTN, HLD, TIA, GERD, Barretts esophagus, CAD s/p (2 stents; , )  For the past three days, patient has had increased moaning (moans at baseline), sneezing, cough with productive sputum and not answering simple questions or following simple commands. Also reports patient has been sleeping a lot more. They went to an Urgent Care and report patient became severely agitated with elevated HR. Daughter reports patient has anxiety about hospitalization causing increased HR. At baseline patient is A&Ox2 (knows name, , location, surrounds, doesn't know year). Patient ambulates unsteady and tries to "run", refusing to use a cane or walker. Has two aides for 9 hours, 6 days ago. Of note, per daughter patient was recently admitted to [facility] on 2018 for subdural hematoma, and several rib fractures after a fall. Patient went to rehab and returned home 2018, and symptoms of dementia have worsened. Admit to decreased appetite and decreased fluid intake. Daughter, Mary, HCP, report patient is a DNR/DNI.     While on telemetry, the patient has been noted to have an irregular tachycardia at 150-160 beats per minute. She remains lethargic at baseline minimal arousability and not able to converse      PAST MEDICAL & SURGICAL HISTORY:  CAD (coronary artery disease)  TIA (transient ischemic attack)  GERD (gastroesophageal reflux disease)  Damian esophagus  Arthritis  Hypertension  High cholesterol  Dementia  Hypothyroid  Cataract  S/P cholecystectomy      SOCIAL HISTORY: no tob/etoh    FAMILY HISTORY: no CAD/CHF  No pertinent family history in first degree relatives      MEDICATIONS  (STANDING):  cefTRIAXone   IVPB 1 Gram(s) IV Intermittent every 24 hours  enoxaparin Injectable 70 milliGRAM(s) SubCutaneous every 12 hours  levothyroxine Injectable 37.5 MICROGram(s) IV Push at bedtime  metoprolol tartrate 25 milliGRAM(s) Oral two times a day  sodium chloride 0.9%. 1000 milliLiter(s) (75 mL/Hr) IV Continuous <Continuous>    MEDICATIONS  (PRN):  acetaminophen  Suppository 650 milliGRAM(s) Rectal every 6 hours PRN For Temp greater than 38 C (100.4 F)      Allergies    No Known Allergies    REVIEW OF SYSTEMS: unobtainable    VITAL SIGNS:   Vital Signs Last 24 Hrs  T(C): 37.4 (11 Aug 2018 07:52), Max: 38.7 (10 Aug 2018 13:20)  T(F): 99.4 (11 Aug 2018 07:52), Max: 101.7 (10 Aug 2018 13:20)  HR: 110 (11 Aug 2018 07:52) (93 - 153)  BP: 155/80 (11 Aug 2018 07:52) (144/70 - 169/99)  BP(mean): --  RR: 17 (11 Aug 2018 07:52) (17 - 20)  SpO2: 94% (11 Aug 2018 07:52) (94% - 97%)    I&O's Summary    10 Aug 2018 07:01  -  11 Aug 2018 07:00  --------------------------------------------------------  IN: 2000 mL / OUT: 0 mL / NET: 2000 mL        PHYSICAL EXAM:    Constitutional: NAD, frail  Eyes:  Pupils round, no lesions  ENMT: no exudate or erythema  Pulmonary:  Decreased breath sounds with rare rhonchi  Cardiovascular: PMI not palpable, tachy normal   S1 and S2, no murmurs, rubs, gallops or clicks  Gastrointestinal: Bowel Sounds present, soft, nontender.   Lymph: No peripheral edema. No cervical lymphadenopathy.  Neurological: grossly non focal  Skin: No rashes. Changes of chronic venous stasis. No cyanosis.  Psych:  cannot assess  Ext: no edema    LABS: All Labs Reviewed:                        10.1   9.03  )-----------( 155      ( 11 Aug 2018 05:52 )             31.0                         11.3   12.43 )-----------( 180      ( 10 Aug 2018 13:54 )             34.8     11 Aug 2018 05:52    137    |  103    |  18     ----------------------------<  102    3.2     |  22     |  0.98   11 Aug 2018 02:15    135    |  103    |  18     ----------------------------<  98     3.3     |  24     |  1.10   10 Aug 2018 13:54    131    |  96     |  23     ----------------------------<  149    3.9     |  24     |  1.40     Ca    8.0        11 Aug 2018 05:52  Ca    7.8        11 Aug 2018 02:15  Ca    8.5        10 Aug 2018 13:54    TPro  7.2    /  Alb  2.8    /  TBili  0.8    /  DBili  x      /  AST  15     /  ALT  11     /  AlkPhos  88     10 Aug 2018 13:54    PT/INR - ( 10 Aug 2018 13:54 )   PT: 11.0 sec;   INR: 1.01 ratio         PTT - ( 10 Aug 2018 13:54 )  PTT:24.2 sec          08-10 @ 13:54  TSH: 0.38    Tele: SR with APCs    < from: Xray Chest 1 View-PORTABLE IMMEDIATE (08.10.18 @ 14:32) >    EXAM:  XR CHEST PORTABLE IMMED 1V                            PROCEDURE DATE:  08/10/2018          INTERPRETATION:  Clinical information: Sepsis.    Technique: Frontal view of the chest.    Comparison: None available.    Findings: There is suboptimal inspiratory result which causes crowding of   the vascular structures and limits evaluation of the lung parenchyma.    There is elevation of the right hemidiaphragm.    The lungs are grossly clear. The heart size is at the upper limits of   normal. There are mild multilevel degenerative changes of the thoracic   spine.    IMPRESSION: No acute findings.                ADELA BURNETT M.D., ATTENDING RADIOLOGIST  This document has been electronically signed. Aug 10 2018  2:35PM                < end of copied text >

## 2018-08-11 NOTE — CONSULT NOTE ADULT - PROBLEM SELECTOR RECOMMENDATION 5
limiting history.    Thank you for consulting us and involving us in the management of this most interesting and challenging case.     We will follow along in the care of this patient.

## 2018-08-11 NOTE — PROGRESS NOTE ADULT - SUBJECTIVE AND OBJECTIVE BOX
Patient is a 89y old  Female who presents with a chief complaint of sepsis 2/2 UTI (10 Aug 2018 15:35)       INTERVAL HPI/OVERNIGHT EVENTS: Patient seen and examined at bedside. Lethargic, on 1:1 observation foe safety.     MEDICATIONS  (STANDING):  cefTRIAXone   IVPB 1 Gram(s) IV Intermittent every 24 hours  dextrose 5% + sodium chloride 0.9%. 1000 milliLiter(s) (65 mL/Hr) IV Continuous <Continuous>  enoxaparin Injectable 70 milliGRAM(s) SubCutaneous every 12 hours  levothyroxine Injectable 37.5 MICROGram(s) IV Push at bedtime  metoprolol tartrate 25 milliGRAM(s) Oral two times a day    MEDICATIONS  (PRN):  acetaminophen  Suppository 650 milliGRAM(s) Rectal every 6 hours PRN For Temp greater than 38 C (100.4 F)      Allergies    No Known Allergies    Intolerances        REVIEW OF SYSTEMS:  Unable to assess secondary to lethargy   Vital Signs Last 24 Hrs  T(C): 37.4 (11 Aug 2018 07:52), Max: 38.7 (10 Aug 2018 13:20)  T(F): 99.4 (11 Aug 2018 07:52), Max: 101.7 (10 Aug 2018 13:20)  HR: 110 (11 Aug 2018 07:52) (93 - 153)  BP: 155/80 (11 Aug 2018 07:52) (144/70 - 169/99)  BP(mean): --  RR: 17 (11 Aug 2018 07:52) (17 - 20)  SpO2: 94% (11 Aug 2018 07:52) (94% - 97%)    PHYSICAL EXAM:  GENERAL: NAD, Lethargic  HEAD:  Atraumatic, Normocephalic  EYES: EOMI, PERRLA, conjunctiva and sclera clear  ENMT: No tonsillar erythema, exudates, or enlargement; Moist mucous membranes  NECK: Supple, No JVD, Normal thyroid  NERVOUS SYSTEM:  Lethargic, able to be awakened, unable to assess properly secondary to lethargy   CHEST/LUNG: Clear to auscultation bilaterally; No rales, rhonchi, wheezing, or rubs  HEART: Regular rate and rhythm; No murmurs, rubs, or gallops  ABDOMEN: Soft, Nontender, Nondistended; Bowel sounds present  EXTREMITIES:  2+ Peripheral Pulses, No clubbing, cyanosis, or edema  LYMPH: No lymphadenopathy noted      LABS:                        10.1   9.03  )-----------( 155      ( 11 Aug 2018 05:52 )             31.0     11 Aug 2018 05:52    137    |  103    |  18     ----------------------------<  102    3.2     |  22     |  0.98     Ca    8.0        11 Aug 2018 05:52    TPro  7.2    /  Alb  2.8    /  TBili  0.8    /  DBili  x      /  AST  15     /  ALT  11     /  AlkPhos  88     10 Aug 2018 13:54    PT/INR - ( 10 Aug 2018 13:54 )   PT: 11.0 sec;   INR: 1.01 ratio         PTT - ( 10 Aug 2018 13:54 )  PTT:24.2 sec  CAPILLARY BLOOD GLUCOSE        BLOOD CULTURE  08-10 @ 19:17   Growth in aerobic bottle: Gram positive cocci in pairs  ***Blood Panel PCR results on this specimen are available  approximately 3 hours after the Gram stain result.***  Gram stain, PCR, and/or culture results may not always  correspond due to difference in methodologies.  ************************************************************  This PCR assay was performed using [employer].  The following targets are tested for: Enterococcus,  vancomycin resistant enterococci, Listeria monocytogenes,  coagulase negative staphylococci, S. aureus,  methicillin resistant S. aureus, Streptococcus agalactiae  (Group B), S. pneumoniae, S. pyogenes (Group A),  Acinetobacter baumannii, Enterobacter cloacae, E. coli,  Klebsiella oxytoca, K. pneumoniae, Proteus sp.,  Serratia marcescens, Haemophilus influenzae,  Neisseria meningitidis, Pseudomonas aeruginosa, Candida  albicans, C. glabrata, C krusei, C parapsilosis,  C. tropicalis and the KPC resistance gene.  "Due to technical problems, Proteus sp. will Not be reported as part of  the BCID panel until further notice"  --  --    RADIOLOGY & ADDITIONAL TESTS:    Imaging Personally Reviewed:  [ ] YES     Consultant(s) Notes Reviewed:      Care Discussed with Consultants/Other Providers:

## 2018-08-12 DIAGNOSIS — I48.91 UNSPECIFIED ATRIAL FIBRILLATION: ICD-10-CM

## 2018-08-12 LAB
-  AMIKACIN: SIGNIFICANT CHANGE UP
-  AMOXICILLIN/CLAVULANIC ACID: SIGNIFICANT CHANGE UP
-  AMPICILLIN/SULBACTAM: SIGNIFICANT CHANGE UP
-  AMPICILLIN: SIGNIFICANT CHANGE UP
-  AZTREONAM: SIGNIFICANT CHANGE UP
-  CEFAZOLIN: SIGNIFICANT CHANGE UP
-  CEFEPIME: SIGNIFICANT CHANGE UP
-  CEFOXITIN: SIGNIFICANT CHANGE UP
-  CEFTRIAXONE: SIGNIFICANT CHANGE UP
-  CIPROFLOXACIN: SIGNIFICANT CHANGE UP
-  ERTAPENEM: SIGNIFICANT CHANGE UP
-  GENTAMICIN: SIGNIFICANT CHANGE UP
-  IMIPENEM: SIGNIFICANT CHANGE UP
-  LEVOFLOXACIN: SIGNIFICANT CHANGE UP
-  MEROPENEM: SIGNIFICANT CHANGE UP
-  NITROFURANTOIN: SIGNIFICANT CHANGE UP
-  PIPERACILLIN/TAZOBACTAM: SIGNIFICANT CHANGE UP
-  TIGECYCLINE: SIGNIFICANT CHANGE UP
-  TOBRAMYCIN: SIGNIFICANT CHANGE UP
-  TRIMETHOPRIM/SULFAMETHOXAZOLE: SIGNIFICANT CHANGE UP
ANION GAP SERPL CALC-SCNC: 8 MMOL/L — SIGNIFICANT CHANGE UP (ref 5–17)
BUN SERPL-MCNC: 20 MG/DL — SIGNIFICANT CHANGE UP (ref 7–23)
CALCIUM SERPL-MCNC: 8 MG/DL — LOW (ref 8.5–10.1)
CHLORIDE SERPL-SCNC: 108 MMOL/L — SIGNIFICANT CHANGE UP (ref 96–108)
CO2 SERPL-SCNC: 23 MMOL/L — SIGNIFICANT CHANGE UP (ref 22–31)
CREAT SERPL-MCNC: 1.2 MG/DL — SIGNIFICANT CHANGE UP (ref 0.5–1.3)
CULTURE RESULTS: SIGNIFICANT CHANGE UP
CULTURE RESULTS: SIGNIFICANT CHANGE UP
GLUCOSE SERPL-MCNC: 112 MG/DL — HIGH (ref 70–99)
HCT VFR BLD CALC: 31 % — LOW (ref 34.5–45)
HGB BLD-MCNC: 9.9 G/DL — LOW (ref 11.5–15.5)
MAGNESIUM SERPL-MCNC: 1.9 MG/DL — SIGNIFICANT CHANGE UP (ref 1.6–2.6)
MCHC RBC-ENTMCNC: 26.3 PG — LOW (ref 27–34)
MCHC RBC-ENTMCNC: 31.9 GM/DL — LOW (ref 32–36)
MCV RBC AUTO: 82.2 FL — SIGNIFICANT CHANGE UP (ref 80–100)
METHOD TYPE: SIGNIFICANT CHANGE UP
NRBC # BLD: 0 /100 WBCS — SIGNIFICANT CHANGE UP (ref 0–0)
ORGANISM # SPEC MICROSCOPIC CNT: SIGNIFICANT CHANGE UP
PLATELET # BLD AUTO: 157 K/UL — SIGNIFICANT CHANGE UP (ref 150–400)
POTASSIUM SERPL-MCNC: 3.3 MMOL/L — LOW (ref 3.5–5.3)
POTASSIUM SERPL-SCNC: 3.3 MMOL/L — LOW (ref 3.5–5.3)
RBC # BLD: 3.77 M/UL — LOW (ref 3.8–5.2)
RBC # FLD: 16.8 % — HIGH (ref 10.3–14.5)
SODIUM SERPL-SCNC: 139 MMOL/L — SIGNIFICANT CHANGE UP (ref 135–145)
SPECIMEN SOURCE: SIGNIFICANT CHANGE UP
SPECIMEN SOURCE: SIGNIFICANT CHANGE UP
WBC # BLD: 8.48 K/UL — SIGNIFICANT CHANGE UP (ref 3.8–10.5)
WBC # FLD AUTO: 8.48 K/UL — SIGNIFICANT CHANGE UP (ref 3.8–10.5)

## 2018-08-12 PROCEDURE — 93010 ELECTROCARDIOGRAM REPORT: CPT

## 2018-08-12 PROCEDURE — 99233 SBSQ HOSP IP/OBS HIGH 50: CPT

## 2018-08-12 PROCEDURE — 99232 SBSQ HOSP IP/OBS MODERATE 35: CPT

## 2018-08-12 RX ORDER — MAGNESIUM SULFATE 500 MG/ML
1 VIAL (ML) INJECTION ONCE
Qty: 0 | Refills: 0 | Status: COMPLETED | OUTPATIENT
Start: 2018-08-12 | End: 2018-08-12

## 2018-08-12 RX ORDER — METOPROLOL TARTRATE 50 MG
2.5 TABLET ORAL ONCE
Qty: 0 | Refills: 0 | Status: COMPLETED | OUTPATIENT
Start: 2018-08-12 | End: 2018-08-12

## 2018-08-12 RX ORDER — POTASSIUM CHLORIDE 20 MEQ
10 PACKET (EA) ORAL
Qty: 0 | Refills: 0 | Status: COMPLETED | OUTPATIENT
Start: 2018-08-12 | End: 2018-08-12

## 2018-08-12 RX ORDER — HALOPERIDOL DECANOATE 100 MG/ML
0.5 INJECTION INTRAMUSCULAR EVERY 12 HOURS
Qty: 0 | Refills: 0 | Status: DISCONTINUED | OUTPATIENT
Start: 2018-08-12 | End: 2018-08-13

## 2018-08-12 RX ORDER — DEXTROSE MONOHYDRATE, SODIUM CHLORIDE, AND POTASSIUM CHLORIDE 50; .745; 4.5 G/1000ML; G/1000ML; G/1000ML
1000 INJECTION, SOLUTION INTRAVENOUS
Qty: 0 | Refills: 0 | Status: DISCONTINUED | OUTPATIENT
Start: 2018-08-12 | End: 2018-08-13

## 2018-08-12 RX ADMIN — Medication 650 MILLIGRAM(S): at 06:50

## 2018-08-12 RX ADMIN — Medication 100 MILLIEQUIVALENT(S): at 10:10

## 2018-08-12 RX ADMIN — Medication 100 GRAM(S): at 13:15

## 2018-08-12 RX ADMIN — ENOXAPARIN SODIUM 70 MILLIGRAM(S): 100 INJECTION SUBCUTANEOUS at 17:11

## 2018-08-12 RX ADMIN — AMPICILLIN SODIUM AND SULBACTAM SODIUM 200 GRAM(S): 250; 125 INJECTION, POWDER, FOR SUSPENSION INTRAMUSCULAR; INTRAVENOUS at 11:23

## 2018-08-12 RX ADMIN — AMPICILLIN SODIUM AND SULBACTAM SODIUM 200 GRAM(S): 250; 125 INJECTION, POWDER, FOR SUSPENSION INTRAMUSCULAR; INTRAVENOUS at 06:48

## 2018-08-12 RX ADMIN — SODIUM CHLORIDE 65 MILLILITER(S): 9 INJECTION, SOLUTION INTRAVENOUS at 01:04

## 2018-08-12 RX ADMIN — Medication 37.5 MICROGRAM(S): at 22:16

## 2018-08-12 RX ADMIN — Medication 100 MILLIEQUIVALENT(S): at 09:06

## 2018-08-12 RX ADMIN — DEXTROSE MONOHYDRATE, SODIUM CHLORIDE, AND POTASSIUM CHLORIDE 60 MILLILITER(S): 50; .745; 4.5 INJECTION, SOLUTION INTRAVENOUS at 11:23

## 2018-08-12 RX ADMIN — Medication 25 MILLIGRAM(S): at 17:11

## 2018-08-12 RX ADMIN — Medication 25 MILLIGRAM(S): at 06:50

## 2018-08-12 RX ADMIN — ENOXAPARIN SODIUM 70 MILLIGRAM(S): 100 INJECTION SUBCUTANEOUS at 06:50

## 2018-08-12 RX ADMIN — AMPICILLIN SODIUM AND SULBACTAM SODIUM 200 GRAM(S): 250; 125 INJECTION, POWDER, FOR SUSPENSION INTRAMUSCULAR; INTRAVENOUS at 17:11

## 2018-08-12 RX ADMIN — Medication 2.5 MILLIGRAM(S): at 17:25

## 2018-08-12 RX ADMIN — Medication 2.5 MILLIGRAM(S): at 17:34

## 2018-08-12 NOTE — PROGRESS NOTE ADULT - PROBLEM SELECTOR PLAN 4
Resolved   Hypokalemia: Repleted, check K, Mg -Multifactorial likely encephalopathy vs hospital acquired delirium in setting of  advanced dementia   -Patient difficult to redirect and occasionally agitated, discussed with daughter need for constant observation and potential need for Haldol v Zyprexa, daughter aware and in agreement.  - CT head negative  -Neuro consult Dr. KELLY Mg called and consult appreciated   -Might need Psych consult??  -1:1 observation for safety

## 2018-08-12 NOTE — CHART NOTE - NSCHARTNOTEFT_GEN_A_CORE
S: called by RN that pt was in rapid afib in 140s. afib is chronic  Pt seen and examed at bedside, sleeping comfortably at bed.          ROS as per HPI    O:  Allergies    No Known Allergies    Intolerances        Vitals  Vital Signs Last 24 Hrs  T(C): 37.2 (12 Aug 2018 00:10), Max: 38.8 (11 Aug 2018 23:17)  T(F): 98.9 (12 Aug 2018 00:10), Max: 101.8 (11 Aug 2018 23:17)  HR: 72 (12 Aug 2018 00:10) (72 - 120)  BP: 115/77 (12 Aug 2018 00:10) (115/77 - 165/87)  BP(mean): --  RR: 18 (12 Aug 2018 00:10) (17 - 18)  SpO2: 98% (12 Aug 2018 00:10) (93% - 98%)      General: WN/WD NAD sleeping comfortably at bed side   Respiratory: CTA B/L  CV: irregular , S1S2, no murmurs, rubs or gallops  Abdominal: Soft, NT, ND +BS,  Extremities: No edema, + peripheral pulses  Neurology: A&Ox3, nonfocal, LEONARD x 4       LABS:                        9.9    8.48  )-----------( 157      ( 12 Aug 2018 06:13 )             31.0     08-12    139  |  108  |  20  ----------------------------<  112<H>  3.3<L>   |  23  |  1.20    Ca    8.0<L>      12 Aug 2018 06:13  Mg     1.9     08-12    TPro  7.2  /  Alb  2.8<L>  /  TBili  0.8  /  DBili  x   /  AST  15  /  ALT  11<L>  /  AlkPhos  88  08-10    PT/INR - ( 10 Aug 2018 13:54 )   PT: 11.0 sec;   INR: 1.01 ratio         PTT - ( 10 Aug 2018 13:54 )  PTT:24.2 sec  Urinalysis Basic - ( 10 Aug 2018 13:54 )    Color: Yellow / Appearance: Clear / S.015 / pH: x  Gluc: x / Ketone: Negative  / Bili: Negative / Urobili: Negative   Blood: x / Protein: 75 mg/dL / Nitrite: Positive   Leuk Esterase: Negative / RBC: x / WBC 0-2   Sq Epi: x / Non Sq Epi: Few / Bacteria: TNTC            RADIOLOGY & ADDITIONAL TESTS:    A/P: S: called by RN that pt was in rapid afib in 140s. afib is chronic  Pt seen and examed at bedside, sleeping comfortably at bed.          ROS as per HPI    O:  Allergies    No Known Allergies    Intolerances        Vitals  Vital Signs Last 24 Hrs  T(C): 37.2 (12 Aug 2018 00:10), Max: 38.8 (11 Aug 2018 23:17)  T(F): 98.9 (12 Aug 2018 00:10), Max: 101.8 (11 Aug 2018 23:17)  HR: 72 (12 Aug 2018 00:10) (72 - 120)  BP: 115/77 (12 Aug 2018 00:10) (115/77 - 165/87)  BP(mean): --  RR: 18 (12 Aug 2018 00:10) (17 - 18)  SpO2: 98% (12 Aug 2018 00:10) (93% - 98%)      General: WN/WD NAD sleeping comfortably at bed side   Respiratory: CTA B/L  CV: irregular , S1S2, no murmurs, rubs or gallops  Abdominal: Soft, NT, ND +BS,  Extremities: No edema, + peripheral pulses  Neurology: A&Ox3, nonfocal, LEONARD x 4       LABS:                        9.9    8.48  )-----------( 157      ( 12 Aug 2018 06:13 )             31.0     08-12    139  |  108  |  20  ----------------------------<  112<H>  3.3<L>   |  23  |  1.20    Ca    8.0<L>      12 Aug 2018 06:13  Mg     1.9     08-12    TPro  7.2  /  Alb  2.8<L>  /  TBili  0.8  /  DBili  x   /  AST  15  /  ALT  11<L>  /  AlkPhos  88  08-10    PT/INR - ( 10 Aug 2018 13:54 )   PT: 11.0 sec;   INR: 1.01 ratio         PTT - ( 10 Aug 2018 13:54 )  PTT:24.2 sec  Urinalysis Basic - ( 10 Aug 2018 13:54 )    Color: Yellow / Appearance: Clear / S.015 / pH: x  Gluc: x / Ketone: Negative  / Bili: Negative / Urobili: Negative   Blood: x / Protein: 75 mg/dL / Nitrite: Positive   Leuk Esterase: Negative / RBC: x / WBC 0-2   Sq Epi: x / Non Sq Epi: Few / Bacteria: TNTC            RADIOLOGY & ADDITIONAL TESTS:    A/P: 89F with PMH of advanced dementia, HTN, HLD, TIA, GERD, Barretts esophagus, CAD s/p (2 stents; , ) presents from Urgent Care; admitted w/ sepsis, now in a. fib with RVR who is asymptomatic and hemodynamic stable     -s/p 1x IV lopressor responding well  -continue with monitor

## 2018-08-12 NOTE — PROGRESS NOTE ADULT - PROBLEM SELECTOR PLAN 5
Daughter reports mild CKD hx, unaware of Cr baseline. Likely due to dehydration  -s/p 2100mL NS bolus in ED, start IVF 75cc/hr x 10 hours  -Follow up AM BUN/Cr Resolved   Hypokalemia: Repleted, check K, Mg

## 2018-08-12 NOTE — PROGRESS NOTE ADULT - PROBLEM SELECTOR PLAN 7
chronic, stable  -Will resume ASA, pending results of CT head chronic, stable  -Hold PPI  -Will hold oral medications until patient able to safely swallow as daughters report decreased appetite and difficulty swallowing/choking  -NPO until speech and swallow evaluation

## 2018-08-12 NOTE — PROGRESS NOTE ADULT - PROBLEM SELECTOR PLAN 1
- Suspect Pyelonephritis   - continue IV Rocephin  - Continue IV fluids with potassium, since hypokalemia   -Tylenol PRN for fever  -RVP negative  -Blood cultures +. Repeat blood cultures pending   -ID Dr. Raymond following. Continue Metoprolol.  Continue Lovenox for now as AC.  TTE  Cardio Following  Tele monitor

## 2018-08-12 NOTE — PROGRESS NOTE ADULT - SUBJECTIVE AND OBJECTIVE BOX
Patient is a 89y old  Female who presents with a chief complaint of sepsis 2/2 UTI (10 Aug 2018 15:35)      INTERVAL HPI/OVERNIGHT EVENTS: Patient seen and examined at bedside. Lethargic but wakes up fro time to time     MEDICATIONS  (STANDING):  ampicillin/sulbactam  IVPB      ampicillin/sulbactam  IVPB 3 Gram(s) IV Intermittent every 6 hours  dextrose 5% + sodium chloride 0.9% with potassium chloride 20 mEq/L 1000 milliLiter(s) (60 mL/Hr) IV Continuous <Continuous>  enoxaparin Injectable 70 milliGRAM(s) SubCutaneous every 12 hours  levothyroxine Injectable 37.5 MICROGram(s) IV Push at bedtime  magnesium sulfate  IVPB 1 Gram(s) IV Intermittent once  metoprolol tartrate 25 milliGRAM(s) Oral two times a day    MEDICATIONS  (PRN):  acetaminophen  Suppository 650 milliGRAM(s) Rectal every 6 hours PRN For Temp greater than 38 C (100.4 F)      Allergies    No Known Allergies    Intolerances        REVIEW OF SYSTEMS:  Unable to obtain, patient is lethargic   Vital Signs Last 24 Hrs  T(C): 37.2 (12 Aug 2018 08:29), Max: 38.8 (11 Aug 2018 23:17)  T(F): 99 (12 Aug 2018 08:29), Max: 101.8 (11 Aug 2018 23:17)  HR: 62 (12 Aug 2018 08:29) (62 - 120)  BP: 126/79 (12 Aug 2018 08:29) (115/77 - 184/81)  BP(mean): --  RR: 17 (12 Aug 2018 08:29) (17 - 19)  SpO2: 97% (12 Aug 2018 08:29) (93% - 98%)    PHYSICAL EXAM:  GENERAL: NAD, Lethargic   HEAD:  Atraumatic, Normocephalic  EYES: EOMI, PERRLA, conjunctiva and sclera clear  ENMT: No tonsillar erythema, exudates, or enlargement; Moist mucous membranes  NECK: Supple, No JVD, Normal thyroid  NERVOUS SYSTEM: Lethargic but able to be awakened. Unable to assess properly since patient lethargic   CHEST/LUNG: Clear to auscultation bilaterally; No rales, rhonchi, wheezing, or rubs  HEART: S1S2+, Regular rate and rhythm  ABDOMEN: Soft, Nontender, Nondistended; Bowel sounds present  EXTREMITIES:  2+ Peripheral Pulses, No clubbing, cyanosis, or edema  LYMPH: No lymphadenopathy noted  SKIN: No rashes or lesions    LABS:                        9.9    8.48  )-----------( 157      ( 12 Aug 2018 06:13 )             31.0     12 Aug 2018 06:13    139    |  108    |  20     ----------------------------<  112    3.3     |  23     |  1.20     Ca    8.0        12 Aug 2018 06:13  Mg     1.9       12 Aug 2018 06:13      PT/INR - ( 10 Aug 2018 13:54 )   PT: 11.0 sec;   INR: 1.01 ratio         PTT - ( 10 Aug 2018 13:54 )  PTT:24.2 sec  CAPILLARY BLOOD GLUCOSE        BLOOD CULTURE  08-10 @ 19:17   No growth to date.  --  Blood Culture PCR  08-10 @ 19:15   >100,000 CFU/ml Klebsiella pneumoniae  --  --    RADIOLOGY & ADDITIONAL TESTS:    Imaging Personally Reviewed:  [ ] YES     Consultant(s) Notes Reviewed:      Care Discussed with Consultants/Other Providers:

## 2018-08-12 NOTE — PROGRESS NOTE ADULT - PROBLEM SELECTOR PLAN 8
chronic, stable  -Start IV Synthroid 37.5 (2:1 Oral:IV form, patient takes oral Synthroid 75mcg at home) Likely will refuse medication given AMS. Plan to transition to oral medications as mental status improves chronic, stable  -Will resume ASA, pending results of CT head

## 2018-08-12 NOTE — PROGRESS NOTE ADULT - PROBLEM SELECTOR PLAN 9
Daughter requesting DNR/DNI.  -Palliative care for MOLST form chronic, stable  -Start IV Synthroid 37.5 (2:1 Oral:IV form, patient takes oral Synthroid 75mcg at home) Likely will refuse medication given AMS. Plan to transition to oral medications as mental status improves

## 2018-08-12 NOTE — PROGRESS NOTE ADULT - PROBLEM SELECTOR PLAN 2
acute, management as above - Suspect Pyelonephritis   - continue IV Rocephin  - Continue IV fluids with potassium, since hypokalemia   -Tylenol PRN for fever  -RVP negative  -Blood cultures +. Repeat blood cultures pending   -ID Dr. Raymond following.

## 2018-08-12 NOTE — PROGRESS NOTE ADULT - ATTENDING COMMENTS
Speech and swallow evaluation is pending  Hypokalemia: Replete. Change fluids with Potassium  Hypomagnesemia: Replete.   Check K, Mg in am  F/U repeat blood cultures.  PT evaluation once able to participate Speech and swallow evaluation is pending  Hypokalemia: Replete. Change fluids with Potassium  Hypomagnesemia: Replete.   Check K, Mg in am  F/U repeat blood cultures.  PT evaluation once able to participate  Cardio f/u Speech and swallow evaluation is pending  Hypokalemia: Replete. Change fluids with Potassium  Hypomagnesemia: Replete.   Check K, Mg in am  F/U repeat blood cultures.  PT evaluation once able to participate  Psych consult called with   Cardio f/u

## 2018-08-12 NOTE — PROGRESS NOTE ADULT - PROBLEM SELECTOR PLAN 6
chronic, stable  -Hold PPI  -Will hold oral medications until patient able to safely swallow as daughters report decreased appetite and difficulty swallowing/choking  -NPO until speech and swallow evaluation -Resolved   -Daughter reports mild CKD hx, unaware of Cr baseline. Likely due to dehydration  -s/p 2100mL NS bolus in ED, start IVF 75cc/hr x 10 hours  -Follow up AM BUN/Cr

## 2018-08-12 NOTE — PROGRESS NOTE ADULT - SUBJECTIVE AND OBJECTIVE BOX
Neurology follow up note    JOHN [id number]      Interval History:    Patient resting in bed     MEDICATIONS    acetaminophen  Suppository 650 milliGRAM(s) Rectal every 6 hours PRN  ampicillin/sulbactam  IVPB      ampicillin/sulbactam  IVPB 3 Gram(s) IV Intermittent every 6 hours  dextrose 5% + sodium chloride 0.9% with potassium chloride 20 mEq/L 1000 milliLiter(s) IV Continuous <Continuous>  enoxaparin Injectable 70 milliGRAM(s) SubCutaneous every 12 hours  levothyroxine Injectable 37.5 MICROGram(s) IV Push at bedtime  metoprolol tartrate 25 milliGRAM(s) Oral two times a day      Allergies    No Known Allergies    Intolerances            Vital Signs Last 24 Hrs  T(C): 37.2 (12 Aug 2018 08:29), Max: 38.8 (11 Aug 2018 23:17)  T(F): 99 (12 Aug 2018 08:29), Max: 101.8 (11 Aug 2018 23:17)  HR: 62 (12 Aug 2018 08:29) (62 - 120)  BP: 126/79 (12 Aug 2018 08:29) (115/77 - 184/81)  BP(mean): --  RR: 17 (12 Aug 2018 08:29) (17 - 19)  SpO2: 97% (12 Aug 2018 08:29) (95% - 98%)      REVIEW OF SYSTEMS:  Limited or unable to obtain secondary to patient's poor mental status.        On Neurological Examination:    Mental Status - Patient is  Confused Dementia Lethargic     Does not follow commands    The patient is arousable to verbal stimuli.     Extraocular movements were intact.  Pupils were equal, round, and reactive bilaterally, 3 mm to 2.      Speech was fluent.  Smile was symmetric.      Motor:  Bilateral upper and lower were 3-/5,       The patient could follow simple commands but had difficulty following complex commands.      GENERAL Exam: Nontoxic , No Acute Distress   	  HEENT:  normocephalic, atraumatic  		  LUNGS:  Decreased bilaterally  	  HEART: Normal S1S2   No murmur RRR        	  GI/ ABDOMEN:  Soft  Non tender    EXTREMITIES:   No Edema  No Clubbing  No Cyanosis     MUSCULOSKELETAL: Normal Range of Motion  	   SKIN: Normal  No Ecchymosis               LABS:  CBC Full  -  ( 12 Aug 2018 06:13 )  WBC Count : 8.48 K/uL  Hemoglobin : 9.9 g/dL  Hematocrit : 31.0 %  Platelet Count - Automated : 157 K/uL  Mean Cell Volume : 82.2 fl  Mean Cell Hemoglobin : 26.3 pg  Mean Cell Hemoglobin Concentration : 31.9 gm/dL  Auto Neutrophil # : x  Auto Lymphocyte # : x  Auto Monocyte # : x  Auto Eosinophil # : x  Auto Basophil # : x  Auto Neutrophil % : x  Auto Lymphocyte % : x  Auto Monocyte % : x  Auto Eosinophil % : x  Auto Basophil % : x    Urinalysis Basic - ( 10 Aug 2018 13:54 )    Color: Yellow / Appearance: Clear / S.015 / pH: x  Gluc: x / Ketone: Negative  / Bili: Negative / Urobili: Negative   Blood: x / Protein: 75 mg/dL / Nitrite: Positive   Leuk Esterase: Negative / RBC: x / WBC 0-2   Sq Epi: x / Non Sq Epi: Few / Bacteria: TNTC          139  |  108  |  20  ----------------------------<  112<H>  3.3<L>   |  23  |  1.20    Ca    8.0<L>      12 Aug 2018 06:13  Mg     1.9         TPro  7.2  /  Alb  2.8<L>  /  TBili  0.8  /  DBili  x   /  AST  15  /  ALT  11<L>  /  AlkPhos  88  10    Hemoglobin A1C:     LIVER FUNCTIONS - ( 10 Aug 2018 13:54 )  Alb: 2.8 g/dL / Pro: 7.2 g/dL / ALK PHOS: 88 U/L / ALT: 11 U/L / AST: 15 U/L / GGT: x           Vitamin B12   PT/INR - ( 10 Aug 2018 13:54 )   PT: 11.0 sec;   INR: 1.01 ratio         PTT - ( 10 Aug 2018 13:54 )  PTT:24.2 sec      RADIOLOGY    ANALYSIS AND PLAN:  This is a 89-year-old with an episode of change in mental status, history of frequent falls, history of TIA, and subdural hematomas.  1.	For change in mental status, possibly this could be secondary to underlying viral type syndrome versus underlying infectious type process.  2.	I would recommend Infectious Disease evaluation.  3.	Antibiotics as needed.  4.	For history of dementia, secondary to the patient's age, I would recommend supportive therapy.  5.	For history of questionable TIAs, I would recommend supportive therapy.  6.	For frequent falls, most likely secondary to age related changes, mechanical falls, and dementia.  7.	For history of subdural hematoma, it appeared to be secondary to an episode of fall with head trauma.  8.	Monitor oral intake.  9.	Fall precautions.  10.	Physical Therapy.  11.	Spoke with the daughter, Mary, in great detail, she does understand that while in the hospital setting, the patient's mental status may deteriorate, especially towards the evening.  12.	Daughter's name is Mary, telephone number is [phone]      Thank you for the courtesy of this consultation.    Greater than 45 minutes spent in direct patient care reviewing  the notes, lab data/ imaging , discussion with multidisciplinary team.

## 2018-08-12 NOTE — PROGRESS NOTE ADULT - SUBJECTIVE AND OBJECTIVE BOX
infectious diseases progress note:    JOHN MICHELLE is a 89y y. o. Female patient    Patient is limited verbally    Allergies    No Known Allergies    Intolerances        ANTIBIOTICS/RELEVANT:  antimicrobials  ampicillin/sulbactam  IVPB      ampicillin/sulbactam  IVPB 3 Gram(s) IV Intermittent every 6 hours    immunologic:    OTHER:  acetaminophen  Suppository 650 milliGRAM(s) Rectal every 6 hours PRN  dextrose 5% + sodium chloride 0.9% with potassium chloride 20 mEq/L 1000 milliLiter(s) IV Continuous <Continuous>  enoxaparin Injectable 70 milliGRAM(s) SubCutaneous every 12 hours  levothyroxine Injectable 37.5 MICROGram(s) IV Push at bedtime  magnesium sulfate  IVPB 1 Gram(s) IV Intermittent once  metoprolol tartrate 25 milliGRAM(s) Oral two times a day      Objective:  Last 24-Vital Signs Last 24 Hrs  T(C): 37.2 (12 Aug 2018 08:29), Max: 38.8 (11 Aug 2018 23:17)  T(F): 99 (12 Aug 2018 08:29), Max: 101.8 (11 Aug 2018 23:17)  HR: 62 (12 Aug 2018 08:29) (62 - 120)  BP: 126/79 (12 Aug 2018 08:29) (115/77 - 184/81)  BP(mean): --  RR: 17 (12 Aug 2018 08:29) (17 - 19)  SpO2: 97% (12 Aug 2018 08:29) (93% - 98%)    T(C): 37.2 (18 @ 08:29), Max: 38.8 (18 @ 23:17)  T(F): 99 (18 @ 08:29), Max: 101.8 (18 @ 23:17)  T(C): 37.2 (18 @ 08:29), Max: 38.8 (18 @ 23:17)  T(F): 99 (18 @ 08:29), Max: 101.8 (18 @ 23:17)  T(C): 37.2 (18 @ 08:29), Max: 38.8 (18 @ 23:17)  T(F): 99 (18 @ 08:29), Max: 101.8 (18 @ 23:17)    PHYSICAL EXAM:  Constitutional: Well-developed, well nourished  Eyes: PERRLA, EOMI  Ear/Nose/Throat: oropharynx normal	  Neck: no JVD, no lymphadenopathy, supple  Respiratory: no accessory muscle use, lung fields bilaterally clear  Cardiovascular: RRR, normal S1, S2 no m/r/g  Gastrointestinal: soft, suprapubic tenderness, no HSM, BS-normal  Extremities: no clubbing, no cyanosis, edema absent  Neuro: patient more alert  Skin: no sig lesions      LABS:                        9.9    8.48  )-----------( 157      ( 12 Aug 2018 06:13 )             31.0       WBC 8.48   @ 06:13  WBC 9.03   @ 05:52  WBC 12.43  08-10 @ 13:54          139  |  108  |  20  ----------------------------<  112<H>  3.3<L>   |  23  |  1.20    Ca    8.0<L>      12 Aug 2018 06:13  Mg     1.9         TPro  7.2  /  Alb  2.8<L>  /  TBili  0.8  /  DBili  x   /  AST  15  /  ALT  11<L>  /  AlkPhos  88  08-10      Creatinine, Serum: 1.20 mg/dL (18 @ 06:13)  Creatinine, Serum: 0.98 mg/dL (18 @ 05:52)  Creatinine, Serum: 1.10 mg/dL (18 @ 02:15)  Creatinine, Serum: 1.40 mg/dL (08-10-18 @ 13:54)      PT/INR - ( 10 Aug 2018 13:54 )   PT: 11.0 sec;   INR: 1.01 ratio         PTT - ( 10 Aug 2018 13:54 )  PTT:24.2 sec  Urinalysis Basic - ( 10 Aug 2018 13:54 )    Color: Yellow / Appearance: Clear / S.015 / pH: x  Gluc: x / Ketone: Negative  / Bili: Negative / Urobili: Negative   Blood: x / Protein: 75 mg/dL / Nitrite: Positive   Leuk Esterase: Negative / RBC: x / WBC 0-2   Sq Epi: x / Non Sq Epi: Few / Bacteria: TNTC            MICROBIOLOGY:    Culture - Blood (08.10.18 @ 19:17)    -  Streptococcus sp. (Not Grp A, B or S pneumoniae): Detec    Gram Stain:   Growth in aerobic bottle: Gram positive cocci in pairs  Growth in anaerobic bottle: Gram positive cocci in pairs    Specimen Source: .Blood Blood-Peripheral    Organism: Blood Culture PCR    Culture Results:   Growth in aerobic and anaerobic bottles: Alpha hemolytic strep  (not Strep. pneumoniae or Enterococcus)  Single set isolate, possible contaminant. Contact  Microbiology if susceptibility testing clinically  indicated.    Culture - Urine (08.10.18 @ 19:15)    Specimen Source: .Urine Catheterized    Culture Results:   >100,000 CFU/ml Klebsiella pneumoniae        RADIOLOGY & ADDITIONAL STUDIES:

## 2018-08-12 NOTE — PROGRESS NOTE ADULT - ASSESSMENT
89F with PMH of advanced dementia, HTN, HLD, TIA, GERD, Barretts esophagus, CAD s/p (2 stents; 2002, 2012) presents from Urgent Care with urinary symptoms and bacteremia now noted since admission

## 2018-08-12 NOTE — CHART NOTE - NSCHARTNOTEFT_GEN_A_CORE
Was called by RN to come evaluate pt for sustained HR in 140s with questionable AFib. Pt was seen and examined at bedside. She denied any CP, SOB, lightheadness, dizziness, HA, blurry vision. VS were obtained which showed the pt was hypertensive at 180/117. She was satting well on RA, and her pulse was found to be irregular at a rate of 143. EKG was obtained which showed sinus tachycardia . 5mg of IV lopressor was administered and the pt was given her po 25mg lopressor. Repeat VS showed a HR of 87 and BP of 170/100. Pt's HR began to trend down and her BP improved.     Vital Signs Last 24 Hrs  T(C): 37.1 (12 Aug 2018 16:20), Max: 38.8 (11 Aug 2018 23:17)  T(F): 98.7 (12 Aug 2018 16:20), Max: 101.8 (11 Aug 2018 23:17)  HR: 111 (12 Aug 2018 16:20) (62 - 120)  BP: 111/80 (12 Aug 2018 16:20) (111/80 - 184/81)  BP(mean): --  RR: 22 (12 Aug 2018 16:20) (17 - 22)  SpO2: 95% (12 Aug 2018 16:20) (95% - 98%)    General: NAD lying comfortably in bed   Respiratory: CTA B/L  CV: irregular , S1S2, no murmurs, rubs or gallops  Abdominal: Soft, NT, ND +BS,  Extremities: No edema, + peripheral pulses      89F with PMH of advanced dementia, HTN, HLD, TIA, GERD, Barretts esophagus, CAD s/p (2 stents; 2002, 2012) presents from Urgent Care; admitted w/ sepsis, now in sinus tachycardia VS questionable a. fib with RVR. She was asymptomatic and hemodynamically stable at the time of the event.  - EKG obtained - sinus tachycardia   - 5mg IV push lopressor   - standing dose of 25mg po lopressor  - will monitor for further episodes

## 2018-08-12 NOTE — PROGRESS NOTE ADULT - SUBJECTIVE AND OBJECTIVE BOX
NP Progress Note     [facility] -- Estella Johnson, Mandie, Aiyana, Guilherme Hurst Savella  Office # [phone]      Follow Up:      HPI:  89F with PMH of advanced dementia, HTN, HLD, TIA, GERD, Barretts esophagus, CAD s/p (2 stents; , ) presents from Urgent Care. History obtained from Daughters, Mary and Carey, over the phone. Patient unable to provide any history; removed IV access and unable to respond to simple questions or provider her name. Per daughter, a few nights ago patient slipped and fell on a water bottle, EMS was called, family refused transfer to ED and patient was lifted back to bed. Daughter reports patient is constantly in the bathroom, "either sleeping or peeing." Daughters reports patient has hx of chronic UTIs "because she touches herself." Was recently prescribed Oxybutynin by PCP but only took medication for 4 days and discontinued on Tuesday because lack of improvement of symptoms of urgency. For the past three days, patient has had increased moaning (moans at baseline), sneezing, cough with productive sputum and not answering simple questions or following simple commands. Also reports patient has been sleeping a lot more. They went to an Urgent Care and report patient became severely agitated with elevated HR. Daughter reports patient has anxiety about hospitalization causing increased HR. At baseline patient is A&Ox2 (knows name, , location, surrounds, doesn't know year). Patient ambulates unsteady and tries to "run", refusing to use a cane or walker. Has two aides for 9 hours, 6 days ago. Of note, per daughter patient was recently admitted to [facility] on 2018 for subdural hematoma, and several rib fractures after a fall. Patient went to rehab and returned home 2018, and symptoms of dementia have worsened. Admit to decreased appetite and decreased fluid intake. Daughter, Mary, HCP, report patient is a DNR/DNI.     In the ED: rectal 101.7,  -> 104, /91, RR 20, SpO2 94% RA. WBC 12.43, lactate 2.5, Na 131, Cr 1.4, UA positive for nitrites. Chest Xray: no acute findings. EKG sinus tachycardia at 145. HR improved to 104 without intervention. Received IV Vancomycin x1, IV Rocephin x1, 2100mL NS bolus, Tylenol. (10 Aug 2018 15:35)        Subjective/Observations: Pt. seen and examined and evaluated. Pt. resting comfortably in bed in NAD, with no respiratory distress, no chest pain, dyspnea, palpitations, PND, or orthopnea.      REVIEW OF SYSTEMS: All other review of systems is negative unless indicated above    PAST MEDICAL & SURGICAL HISTORY:  CAD (coronary artery disease)  TIA (transient ischemic attack)  GERD (gastroesophageal reflux disease)  Damian esophagus  Arthritis  Hypertension  High cholesterol  Dementia  Hypothyroid  Cataract  S/P cholecystectomy      MEDICATIONS  (STANDING):  ampicillin/sulbactam  IVPB      ampicillin/sulbactam  IVPB 3 Gram(s) IV Intermittent every 6 hours  dextrose 5% + sodium chloride 0.9%. 1000 milliLiter(s) (65 mL/Hr) IV Continuous <Continuous>  enoxaparin Injectable 70 milliGRAM(s) SubCutaneous every 12 hours  levothyroxine Injectable 37.5 MICROGram(s) IV Push at bedtime  metoprolol tartrate 25 milliGRAM(s) Oral two times a day    MEDICATIONS  (PRN):  acetaminophen  Suppository 650 milliGRAM(s) Rectal every 6 hours PRN For Temp greater than 38 C (100.4 F)      Allergies: No Known Allergies    Vital Signs Last 24 Hrs  T(C): 37.9 (12 Aug 2018 06:42), Max: 38.8 (11 Aug 2018 23:17)  T(F): 100.3 (12 Aug 2018 06:42), Max: 101.8 (11 Aug 2018 23:17)  HR: 118 (12 Aug 2018 06:42) (72 - 120)  BP: 184/81 (12 Aug 2018 06:42) (115/77 - 184/81)  BP(mean): --  RR: 19 (12 Aug 2018 06:42) (17 - 19)  SpO2: 97% (12 Aug 2018 06:42) (93% - 98%)    I&O's Summary    11 Aug 2018 07:01  -  12 Aug 2018 07:00  --------------------------------------------------------  IN: 1834 mL / OUT: 300 mL / NET: 1534 mL              LABS: All Labs Reviewed:                        9.9    8.48  )-----------( 157      ( 12 Aug 2018 06:13 )             31.0                  12 Aug 2018 06:13    139    |  108    |  20     ----------------------------<  112    3.3     |  23     |  1.20     Mg     1.9       12 Aug 2018 06:13    TPro  7.2    /  Alb  2.8    /  TBili  0.8    /  DBili  x      /  AST  15     /  ALT  11     /  AlkPhos  88     10 Aug 2018 13:54         Echo:    Stress Testing:     Cath:    Imaging:    Interpretation of Telemetry:      Physical Exam:  Appearance: [ ] Normal  [ ] abnormal [ ] NAD   Eyes: [ ] PERRL [ ] EOMI  HEENT: [ ] Normal [ ] Abnormal oral mucosa [ ]NC/AT  Cardiovascular: [ ] S1 [ ] S2 [ ] RRR [ ] m/r/g [ ]edema [ ] JVP  Procedural Access Site: [ ]  hematoma [ ] tender to palpation [ ] 2+ pulse [ ] bruit [ ] Ecchymosis  Respiratory: [ ] Clear to auscultation bilaterally  Gastrointestinal: [ ] Soft [ ] tenderness[ ] distension [ ] BS  Musculoskeletal: [ ] clubbing [ ] joint deformity   Neurologic: [ ] Non-focal  Lymphatic: [ ] lymphadenopathy  Psychiatry: [ ] AAOx3  [ ] confused [ ] disoriented [ ] Mood & affect appropriate  Skin: [ ]  rashes [ ] ecchymoses [ ] cyanosis NP Progress Note     [facility] -- Estella Johnson, Mandie, Aiyana, Guilherme Hurst Savella  Office # [phone]      Follow Up:      HPI:  89F with PMH of advanced dementia, HTN, HLD, TIA, GERD, Barretts esophagus, CAD s/p (2 stents; , ) presents from Urgent Care. History obtained from Daughters, Mary and Carey, over the phone. Patient unable to provide any history; removed IV access and unable to respond to simple questions or provider her name. Per daughter, a few nights ago patient slipped and fell on a water bottle, EMS was called, family refused transfer to ED and patient was lifted back to bed. Daughter reports patient is constantly in the bathroom, "either sleeping or peeing." Daughters reports patient has hx of chronic UTIs "because she touches herself." Was recently prescribed Oxybutynin by PCP but only took medication for 4 days and discontinued on Tuesday because lack of improvement of symptoms of urgency. For the past three days, patient has had increased moaning (moans at baseline), sneezing, cough with productive sputum and not answering simple questions or following simple commands. Also reports patient has been sleeping a lot more. They went to an Urgent Care and report patient became severely agitated with elevated HR. Daughter reports patient has anxiety about hospitalization causing increased HR. At baseline patient is A&Ox2 (knows name, , location, surrounds, doesn't know year). Patient ambulates unsteady and tries to "run", refusing to use a cane or walker. Has two aides for 9 hours, 6 days ago. Of note, per daughter patient was recently admitted to [facility] on 2018 for subdural hematoma, and several rib fractures after a fall. Patient went to rehab and returned home 2018, and symptoms of dementia have worsened. Admit to decreased appetite and decreased fluid intake. Daughter, Mary, HCP, report patient is a DNR/DNI.     In the ED: rectal 101.7,  -> 104, /91, RR 20, SpO2 94% RA. WBC 12.43, lactate 2.5, Na 131, Cr 1.4, UA positive for nitrites. Chest Xray: no acute findings. EKG sinus tachycardia at 145. HR improved to 104 without intervention. Received IV Vancomycin x1, IV Rocephin x1, 2100mL NS bolus, Tylenol. (10 Aug 2018 15:35)        Subjective/Observations: Pt. seen and examined and evaluated. Pt. resting comfortably in bed in NAD, with no respiratory distress, no chest pain, dyspnea, palpitations, PND, or orthopnea. 1:1 at bedside       REVIEW OF SYSTEMS: All other review of systems is negative unless indicated above    PAST MEDICAL & SURGICAL HISTORY:  CAD (coronary artery disease)  TIA (transient ischemic attack)  GERD (gastroesophageal reflux disease)  Damian esophagus  Arthritis  Hypertension  High cholesterol  Dementia  Hypothyroid  Cataract  S/P cholecystectomy      MEDICATIONS  (STANDING):  ampicillin/sulbactam  IVPB      ampicillin/sulbactam  IVPB 3 Gram(s) IV Intermittent every 6 hours  dextrose 5% + sodium chloride 0.9%. 1000 milliLiter(s) (65 mL/Hr) IV Continuous <Continuous>  enoxaparin Injectable 70 milliGRAM(s) SubCutaneous every 12 hours  levothyroxine Injectable 37.5 MICROGram(s) IV Push at bedtime  metoprolol tartrate 25 milliGRAM(s) Oral two times a day    MEDICATIONS  (PRN):  acetaminophen  Suppository 650 milliGRAM(s) Rectal every 6 hours PRN For Temp greater than 38 C (100.4 F)      Allergies: No Known Allergies    Vital Signs Last 24 Hrs  T(C): 37.9 (12 Aug 2018 06:42), Max: 38.8 (11 Aug 2018 23:17)  T(F): 100.3 (12 Aug 2018 06:42), Max: 101.8 (11 Aug 2018 23:17)  HR: 118 (12 Aug 2018 06:42) (72 - 120)  BP: 184/81 (12 Aug 2018 06:42) (115/77 - 184/81)  BP(mean): --  RR: 19 (12 Aug 2018 06:42) (17 - 19)  SpO2: 97% (12 Aug 2018 06:42) (93% - 98%)    I&O's Summary    11 Aug 2018 07:01  -  12 Aug 2018 07:00  --------------------------------------------------------  IN: 1834 mL / OUT: 300 mL / NET: 1534 mL        LABS: All Labs Reviewed:                        9.9    8.48  )-----------( 157      ( 12 Aug 2018 06:13 )             31.0                  12 Aug 2018 06:13    139    |  108    |  20     ----------------------------<  112    3.3     |  23     |  1.20     Mg     1.9       12 Aug 2018 06:13    TPro  7.2    /  Alb  2.8    /  TBili  0.8    /  DBili  x      /  AST  15     /  ALT  11     /  AlkPhos  88     10 Aug 2018 13:54         Echo:    Stress Testing:     Cath:    Imaging:    Interpretation of Telemetry: Overnight on telemetry NSR 70's frequent PAC's       Physical Exam:  Appearance: [ ] Normal  [ ] abnormal [X ] NAD   Eyes: [ ] PERRL [ ] EOMI  HEENT: [ ] Normal [ ] Abnormal oral mucosa [ ]NC/AT  Cardiovascular: [X ] S1 [X ] S2 [ ] RRR [ ] m/r/g [ ]edema [ ] JVP  Procedural Access Site: [ ]  hematoma [ ] tender to palpation [ ] 2+ pulse [ ] bruit [ ] Ecchymosis  Respiratory: [X ] Clear to auscultation bilaterally  Gastrointestinal: [ ] Soft [ ] tenderness[ ] distension [ ] BS  Musculoskeletal: [ ] clubbing [ ] joint deformity   Neurologic: [ ] Non-focal  Lymphatic: [ ] lymphadenopathy  Psychiatry: [X ] AAOx3  [ ] confused [ ] disoriented [ ] Mood & affect appropriate  Skin: [ ]  rashes [ ] ecchymoses [ ] cyanosis

## 2018-08-12 NOTE — PROGRESS NOTE ADULT - ASSESSMENT
Ms. Maria is being treated for possible sepsis with broad-spectrum antibiotics. Her worsening mental status is suggested to be due to her metabolic derangement. Review of her telemetry reveals sinus rhythm with frequent atrial premature contractions. Review of her electrocardiography examination overnight does suggest short periods of atrial fibrillation possibly in part secondary to acute metabolic derangements. In any event conservative therapy is warranted and she is not likely to be a candidate for prolonged anticoagulation despite her elevated CHADS score.    - Continue telemetry  - Continue full dose enoxaparin at present  - Continue beta blocker therapy and follow heart rate and rhythm closely  - Antibiotics per medicine  - Echocardiography pending to assess for any underlying structural heart disease  - Further management can be dependent on her clinical course

## 2018-08-12 NOTE — PROGRESS NOTE ADULT - PROBLEM SELECTOR PLAN 3
-Multifactorial likely encephalopathy vs hospital acquired delirium in setting of  advanced dementia   -Patient difficult to redirect and occasionally agitated, discussed with daughter need for constant observation and potential need for Haldol v Zyprexa, daughter aware and in agreement.  - CT head negative  -Neuro consult Dr. KELLY Mg called and consult appreciated   -Might need Psych consult??  -1:1 observation for safety acute, management as above

## 2018-08-12 NOTE — PROGRESS NOTE ADULT - ASSESSMENT
89F with PMH of advanced dementia, HTN, HLD, TIA, GERD, Barretts esophagus, CAD s/p (2 stents; 2002, 2012) admitted for sepsis 2/2 UTI, gram + bacteremia,  AMS likely encephalopathy vs hospital acquired delirium, SARA. 89F with PMH of advanced dementia, HTN, HLD, TIA, GERD, Barretts esophagus, CAD s/p (2 stents; 2002, 2012) admitted for sepsis 2/2 UTI, gram + bacteremia,  AMS likely encephalopathy vs hospital acquired delirium, SARA, and now new onset Afib with RVR

## 2018-08-13 DIAGNOSIS — G30.1 ALZHEIMER'S DISEASE WITH LATE ONSET: ICD-10-CM

## 2018-08-13 DIAGNOSIS — R41.0 DISORIENTATION, UNSPECIFIED: ICD-10-CM

## 2018-08-13 LAB
ANION GAP SERPL CALC-SCNC: 12 MMOL/L — SIGNIFICANT CHANGE UP (ref 5–17)
BUN SERPL-MCNC: 18 MG/DL — SIGNIFICANT CHANGE UP (ref 7–23)
CALCIUM SERPL-MCNC: 8 MG/DL — LOW (ref 8.5–10.1)
CHLORIDE SERPL-SCNC: 109 MMOL/L — HIGH (ref 96–108)
CO2 SERPL-SCNC: 23 MMOL/L — SIGNIFICANT CHANGE UP (ref 22–31)
CREAT SERPL-MCNC: 1.1 MG/DL — SIGNIFICANT CHANGE UP (ref 0.5–1.3)
GLUCOSE SERPL-MCNC: 130 MG/DL — HIGH (ref 70–99)
HCT VFR BLD CALC: 33.6 % — LOW (ref 34.5–45)
HGB BLD-MCNC: 10.6 G/DL — LOW (ref 11.5–15.5)
MCHC RBC-ENTMCNC: 26 PG — LOW (ref 27–34)
MCHC RBC-ENTMCNC: 31.5 GM/DL — LOW (ref 32–36)
MCV RBC AUTO: 82.6 FL — SIGNIFICANT CHANGE UP (ref 80–100)
NRBC # BLD: 0 /100 WBCS — SIGNIFICANT CHANGE UP (ref 0–0)
PLATELET # BLD AUTO: 183 K/UL — SIGNIFICANT CHANGE UP (ref 150–400)
POTASSIUM SERPL-MCNC: 3.2 MMOL/L — LOW (ref 3.5–5.3)
POTASSIUM SERPL-SCNC: 3.2 MMOL/L — LOW (ref 3.5–5.3)
RBC # BLD: 4.07 M/UL — SIGNIFICANT CHANGE UP (ref 3.8–5.2)
RBC # FLD: 16.4 % — HIGH (ref 10.3–14.5)
SODIUM SERPL-SCNC: 144 MMOL/L — SIGNIFICANT CHANGE UP (ref 135–145)
WBC # BLD: 8.06 K/UL — SIGNIFICANT CHANGE UP (ref 3.8–10.5)
WBC # FLD AUTO: 8.06 K/UL — SIGNIFICANT CHANGE UP (ref 3.8–10.5)

## 2018-08-13 PROCEDURE — 99233 SBSQ HOSP IP/OBS HIGH 50: CPT

## 2018-08-13 PROCEDURE — 99233 SBSQ HOSP IP/OBS HIGH 50: CPT | Mod: 25

## 2018-08-13 PROCEDURE — 93306 TTE W/DOPPLER COMPLETE: CPT | Mod: 26

## 2018-08-13 RX ORDER — POTASSIUM CHLORIDE 20 MEQ
10 PACKET (EA) ORAL
Qty: 0 | Refills: 0 | Status: COMPLETED | OUTPATIENT
Start: 2018-08-13 | End: 2018-08-13

## 2018-08-13 RX ORDER — OLANZAPINE 15 MG/1
2.5 TABLET, FILM COATED ORAL EVERY 6 HOURS
Qty: 0 | Refills: 0 | Status: DISCONTINUED | OUTPATIENT
Start: 2018-08-13 | End: 2018-08-15

## 2018-08-13 RX ORDER — CEPHALEXIN 500 MG
250 CAPSULE ORAL
Qty: 0 | Refills: 0 | Status: DISCONTINUED | OUTPATIENT
Start: 2018-08-13 | End: 2018-08-15

## 2018-08-13 RX ORDER — METOPROLOL TARTRATE 50 MG
5 TABLET ORAL ONCE
Qty: 0 | Refills: 0 | Status: COMPLETED | OUTPATIENT
Start: 2018-08-13 | End: 2018-08-13

## 2018-08-13 RX ORDER — METOPROLOL TARTRATE 50 MG
25 TABLET ORAL EVERY 8 HOURS
Qty: 0 | Refills: 0 | Status: DISCONTINUED | OUTPATIENT
Start: 2018-08-13 | End: 2018-08-15

## 2018-08-13 RX ADMIN — DEXTROSE MONOHYDRATE, SODIUM CHLORIDE, AND POTASSIUM CHLORIDE 60 MILLILITER(S): 50; .745; 4.5 INJECTION, SOLUTION INTRAVENOUS at 03:33

## 2018-08-13 RX ADMIN — AMPICILLIN SODIUM AND SULBACTAM SODIUM 200 GRAM(S): 250; 125 INJECTION, POWDER, FOR SUSPENSION INTRAMUSCULAR; INTRAVENOUS at 05:35

## 2018-08-13 RX ADMIN — Medication 25 MILLIGRAM(S): at 05:34

## 2018-08-13 RX ADMIN — Medication 100 MILLIEQUIVALENT(S): at 13:30

## 2018-08-13 RX ADMIN — Medication 25 MILLIGRAM(S): at 23:59

## 2018-08-13 RX ADMIN — Medication 250 MILLIGRAM(S): at 11:29

## 2018-08-13 RX ADMIN — ENOXAPARIN SODIUM 70 MILLIGRAM(S): 100 INJECTION SUBCUTANEOUS at 17:07

## 2018-08-13 RX ADMIN — Medication 250 MILLIGRAM(S): at 23:59

## 2018-08-13 RX ADMIN — Medication 100 MILLIEQUIVALENT(S): at 10:18

## 2018-08-13 RX ADMIN — Medication 650 MILLIGRAM(S): at 05:31

## 2018-08-13 RX ADMIN — ENOXAPARIN SODIUM 70 MILLIGRAM(S): 100 INJECTION SUBCUTANEOUS at 05:34

## 2018-08-13 RX ADMIN — Medication 250 MILLIGRAM(S): at 17:08

## 2018-08-13 RX ADMIN — Medication 5 MILLIGRAM(S): at 18:06

## 2018-08-13 RX ADMIN — AMPICILLIN SODIUM AND SULBACTAM SODIUM 200 GRAM(S): 250; 125 INJECTION, POWDER, FOR SUSPENSION INTRAMUSCULAR; INTRAVENOUS at 00:10

## 2018-08-13 RX ADMIN — DEXTROSE MONOHYDRATE, SODIUM CHLORIDE, AND POTASSIUM CHLORIDE 60 MILLILITER(S): 50; .745; 4.5 INJECTION, SOLUTION INTRAVENOUS at 13:14

## 2018-08-13 RX ADMIN — Medication 100 MILLIEQUIVALENT(S): at 11:26

## 2018-08-13 NOTE — PROGRESS NOTE ADULT - PROBLEM SELECTOR PLAN 4
-Multifactorial likely encephalopathy vs hospital acquired delirium in setting of  advanced dementia   -Patient difficult to redirect and occasionally agitated, discussed with daughter need for constant observation and potential need for Haldol v Zyprexa, daughter aware and in agreement.  - CT head negative  -Neuro consult Dr. KELLY Mg called and consult appreciated   -Might need Psych consult??  -1:1 observation for safety

## 2018-08-13 NOTE — PROGRESS NOTE ADULT - SUBJECTIVE AND OBJECTIVE BOX
Neurology follow up note    JOHN [id number]      Interval History:    Patient feels ok  awake in bed     MEDICATIONS    acetaminophen  Suppository 650 milliGRAM(s) Rectal every 6 hours PRN  cephalexin 250 milliGRAM(s) Oral four times a day  enoxaparin Injectable 70 milliGRAM(s) SubCutaneous every 12 hours  haloperidol    Injectable 0.5 milliGRAM(s) IntraMuscular every 12 hours PRN  levothyroxine Injectable 37.5 MICROGram(s) IV Push at bedtime  metoprolol tartrate 25 milliGRAM(s) Oral two times a day      Allergies    No Known Allergies    Intolerances            Vital Signs Last 24 Hrs  T(C): 37.4 (13 Aug 2018 07:50), Max: 37.8 (13 Aug 2018 05:10)  T(F): 99.4 (13 Aug 2018 07:50), Max: 100.1 (13 Aug 2018 05:10)  HR: 84 (13 Aug 2018 07:50) (71 - 111)  BP: 136/81 (13 Aug 2018 07:50) (111/80 - 188/77)  BP(mean): --  RR: 17 (13 Aug 2018 07:50) (17 - 22)  SpO2: 96% (13 Aug 2018 07:50) (95% - 97%)    REVIEW OF SYSTEMS:  Slightly limited.   Constitutional:  Positive history of fevers and chills.    Head:  No headaches.    Eyes:  No double vision or blurry vision.    Ears:  No ringing in the ears.    Neck:  No neck pain.    Respiratory:  No shortness of breath.    Cardiovascular:  No chest pain.    Abdomen:  No nausea, vomiting, or abdominal pain.    Extremities/Neurological:  No numbness or tingling.    Musculoskeletal:  Positive history of joint pain.    General:  Positive history of frequent falls and positive episodes of poor oral intak    NEUROLOGIC:  The patient is arousable to verbal stimuli.  Location was hospital, year was 2017, month was unknown, and was able to name objects such as a pen.      Extraocular movements were intact.  Pupils were equal, round, and reactive bilaterally, 3 mm to 2.      Speech was fluent.  Smile was symmetric.     Motor:  Bilateral upper and lower were 4-/5, would say age appropriate.     The patient could follow simple commands but had difficulty following complex commands.     GENERAL Exam: Nontoxic , No Acute Distress   	  HEENT:  normocephalic, atraumatic  		  LUNGS:  Decreased bilaterally  	  HEART: Normal S1S2   No murmur RRR        	  GI/ ABDOMEN:  Soft  Non tender    EXTREMITIES:   No Edema  No Clubbing  No Cyanosis     MUSCULOSKELETAL: Normal Range of Motion  	   SKIN: Normal  No Ecchymosis          LABS:  CBC Full  -  ( 13 Aug 2018 06:23 )  WBC Count : 8.06 K/uL  Hemoglobin : 10.6 g/dL  Hematocrit : 33.6 %  Platelet Count - Automated : 183 K/uL  Mean Cell Volume : 82.6 fl  Mean Cell Hemoglobin : 26.0 pg  Mean Cell Hemoglobin Concentration : 31.5 gm/dL  Auto Neutrophil # : x  Auto Lymphocyte # : x  Auto Monocyte # : x  Auto Eosinophil # : x  Auto Basophil # : x  Auto Neutrophil % : x  Auto Lymphocyte % : x  Auto Monocyte % : x  Auto Eosinophil % : x  Auto Basophil % : x      08-13    144  |  109<H>  |  18  ----------------------------<  130<H>  3.2<L>   |  23  |  1.10    Ca    8.0<L>      13 Aug 2018 06:23  Mg     1.9     08-12      Hemoglobin A1C:       Vitamin B12         RADIOLOGY    ANALYSIS AND PLAN:  This is a 89-year-old with an episode of change in mental status, history of frequent falls, history of TIA, and subdural hematomas.  1.	For change in mental status, possibly this could be secondary to underlying viral type syndrome versus underlying infectious type process.  2.	I would recommend Infectious Disease evaluation.  3.	Antibiotics as needed.  4.	For history of dementia, secondary to the patient's age, I would recommend supportive therapy.  5.	For history of questionable TIAs, I would recommend supportive therapy.  6.	For frequent falls, most likely secondary to age related changes, mechanical falls, and dementia.  7.	For history of subdural hematoma, it appeared to be secondary to an episode of fall with head trauma.  8.	S/S evaluation   9.	Monitor oral intake.  10.	Fall precautions.  11.	Physical Therapy.  12.	Spoke with the daughter, Mary, in great detail, she does understand that while in the hospital setting, the patient's mental status may deteriorate, especially towards the evening.  13.	Daughter's name is Mary, telephone number is [phone]. 8/13/18      Thank you for the courtesy of this consultation.    Greater than 45 minutes spent in direct patient care reviewing  the notes, lab data/ imaging , discussion with multidisciplinary team.

## 2018-08-13 NOTE — DIETITIAN INITIAL EVALUATION ADULT. - PROBLEM SELECTOR PLAN 10
IMPROVE VTE Individual Risk Assessment          RISK                                                          Points  [  ] Previous VTE                                                3  [  ] Thrombophilia                                             2  [  ] Lower limb paralysis                                   2        (unable to hold up >15 seconds)    [  ] Current Cancer                                             2         (within 6 months)  [  ] Immobilization > 24 hrs                              1  [  ] ICU/CCU stay > 24 hours                             1  [x  ] Age > 60                                                         1    IMPROVE VTE Score: 1. Padua score 1  DVT prophylaxis: SCDs pending head CT  Physical Therapy once mental status improves, family requesting no rehab  Aspiration Precautions  Fall risk

## 2018-08-13 NOTE — PROGRESS NOTE ADULT - PROBLEM SELECTOR PLAN 1
Continue Metoprolol.  Continue Lovenox for now as AC but cardio patient not a good candidated for long term AC. Will f/u   TTE  Cardio Following  Tele monitor

## 2018-08-13 NOTE — PROGRESS NOTE ADULT - ATTENDING COMMENTS
I personally saw and examined the patient in detail.  I have spoken to the above provider regarding the assessment and plan of care.  I reviewed the above assessment and plan of care, and agree.  I have made changes in the body of the note where appropriate.  Persistent periods of PSVT.  To increase beta blocker.  Continue full AC.  Needs to remain on telemetry.

## 2018-08-13 NOTE — DIETITIAN INITIAL EVALUATION ADULT. - PROBLEM SELECTOR PLAN 3
multifactorial likely due to advanced dementia v sepsis  R/O encephalopathy  -Patient difficult to redirect and occasionally agitated, discussed with daughter need for constant observation and potential need for Haldol v Zyprexa, daughter aware and in agreement.  -Follow up CT head, pt with hx of recent fall

## 2018-08-13 NOTE — BEHAVIORAL HEALTH ASSESSMENT NOTE - HPI (INCLUDE ILLNESS QUALITY, SEVERITY, DURATION, TIMING, CONTEXT, MODIFYING FACTORS, ASSOCIATED SIGNS AND SYMPTOMS)
89F with PMH of advanced dementia, HTN, HLD, TIA, GERD, Barretts esophagus, CAD s/p (2 stents; , ) presents from Urgent Care. History obtained from Daughters, Mary and Carye, over the phone. Patient unable to provide any history; removed IV access and unable to respond to simple questions or provider her name. Per daughter, a few nights ago patient slipped and fell on a water bottle, EMS was called, family refused transfer to ED and patient was lifted back to bed. Daughter reports patient is constantly in the bathroom, "either sleeping or peeing." Daughters reports patient has hx of chronic UTIs "because she touches herself." Was recently prescribed Oxybutynin by PCP but only took medication for 4 days and discontinued on Tuesday because lack of improvement of symptoms of urgency. For the past three days, patient has had increased moaning (moans at baseline), sneezing, cough with productive sputum and not answering simple questions or following simple commands. Also reports patient has been sleeping a lot more. They went to an Urgent Care and report patient became severely agitated with elevated HR. Daughter reports patient has anxiety about hospitalization causing increased HR. At baseline patient is A&Ox2 (knows name, , location, surrounds, doesn't know year). Patient ambulates unsteady and tries to "run", refusing to use a cane or walker. Has two aides for 9 hours, 6 days ago. Of note, per daughter patient was recently admitted to [facility] on 2018 for subdural hematoma, and several rib fractures after a fall. Patient went to rehab and returned home 2018, and symptoms of dementia have worsened. Admit to decreased appetite and decreased fluid intake. Daughter, Mary, HCP, report patient is a DNR/DNI.   Patient seen, evaluated and chart reviewed. Patient is currently lucid, no evidence of acute agitation, and it appears that her agitation is related to the change of a day.

## 2018-08-13 NOTE — PROGRESS NOTE ADULT - PROBLEM SELECTOR PLAN 6
-Resolved   -Daughter reports mild CKD hx, unaware of Cr baseline. Likely due to dehydration  -s/p 2100mL NS bolus in ED, start IVF 75cc/hr x 10 hours  -Follow up AM BUN/Cr

## 2018-08-13 NOTE — PROGRESS NOTE ADULT - SUBJECTIVE AND OBJECTIVE BOX
[facility] -- Estella Johnson, Aiyana Lockwood, Guilherme Hurst Savella  Office # [phone]      Follow Up:  abnormal ECG; AF    Subjective/Observations: Seen and examined.  Resting comfortably in bed, lethargic although awake.  Unable to partake in meaningful conversation and history.  No signs of orthopnea or PND.  NAD      REVIEW OF SYSTEMS: All other review of systems is negative unless indicated above    PAST MEDICAL & SURGICAL HISTORY:  CAD (coronary artery disease)  TIA (transient ischemic attack)  GERD (gastroesophageal reflux disease)  Damian esophagus  Arthritis  Hypertension  High cholesterol  Dementia  Hypothyroid  Cataract  S/P cholecystectomy      MEDICATIONS  (STANDING):  cephalexin 250 milliGRAM(s) Oral four times a day  enoxaparin Injectable 70 milliGRAM(s) SubCutaneous every 12 hours  levothyroxine Injectable 37.5 MICROGram(s) IV Push at bedtime  metoprolol tartrate 25 milliGRAM(s) Oral two times a day    MEDICATIONS  (PRN):  acetaminophen  Suppository 650 milliGRAM(s) Rectal every 6 hours PRN For Temp greater than 38 C (100.4 F)  haloperidol    Injectable 0.5 milliGRAM(s) IntraMuscular every 12 hours PRN Agitation and combativeness      Allergies    No Known Allergies    Intolerances            Vital Signs Last 24 Hrs  T(C): 37.4 (13 Aug 2018 07:50), Max: 37.8 (13 Aug 2018 05:10)  T(F): 99.4 (13 Aug 2018 07:50), Max: 100.1 (13 Aug 2018 05:10)  HR: 84 (13 Aug 2018 07:50) (84 - 111)  BP: 136/81 (13 Aug 2018 07:50) (111/80 - 188/77)  BP(mean): --  RR: 17 (13 Aug 2018 07:50) (17 - 22)  SpO2: 96% (13 Aug 2018 07:50) (95% - 97%)    I&O's Summary    12 Aug 2018 07:01  -  13 Aug 2018 07:00  --------------------------------------------------------  IN: 920 mL / OUT: 350 mL / NET: 570 mL    13 Aug 2018 07:01  -  13 Aug 2018 14:16  --------------------------------------------------------  IN: 100 mL / OUT: 0 mL / NET: 100 mL          PHYSICAL EXAM:  TELE: SR 70s to 108 with freq PACs with some bursts   Constitutional: NAD, awake, lethargic, weak, frail  HEENT: Dry Mucous Membranes, Anicteric  Pulmonary: Non-labored, breath sounds are clear anteriorly although difficult exam appear to be decreased bilaterally  Cardiovascular: Regular, S1 and S2, No murmurs, rubs, gallops or clicks  Gastrointestinal: Bowel Sounds present, soft, nontender.   Lymph: mild peripheral edema. No lymphadenopathy.  Skin: No visible rashes or ulcers.  Psych:  Mood & affect flat unable to obtain history    LABS: All Labs Reviewed:                        10.6   8.06  )-----------( 183      ( 13 Aug 2018 06:23 )             33.6                         9.9    8.48  )-----------( 157      ( 12 Aug 2018 06:13 )             31.0                         10.1   9.03  )-----------( 155      ( 11 Aug 2018 05:52 )             31.0     13 Aug 2018 06:23    144    |  109    |  18     ----------------------------<  130    3.2     |  23     |  1.10   12 Aug 2018 06:13    139    |  108    |  20     ----------------------------<  112    3.3     |  23     |  1.20   11 Aug 2018 05:52    137    |  103    |  18     ----------------------------<  102    3.2     |  22     |  0.98     Ca    8.0        13 Aug 2018 06:23  Ca    8.0        12 Aug 2018 06:13  Ca    8.0        11 Aug 2018 05:52  Mg     1.9       12 Aug 2018 06:13    < from: 12 Lead ECG (08.11.18 @ 00:59) >  Ventricular Rate 146 BPM    Atrial Rate 122 BPM    QRS Duration 72 ms    Q-T Interval 320 ms    QTC Calculation(Bezet) 498 ms    R Axis 63 degrees    T Axis 33 degrees    Diagnosis Line Multifocal atrial tachycardia  Nonspecific ST abnormality , probably digitalis effect  Abnormal ECG  When compared with ECG of 10-AUG-2018 13:28, (Unconfirmed)  Atrial fibrillation has replaced Sinus rhythm  Confirmed by Rashaun DAVIS, Oswald ([id number]) on 8/12/2018 9:05:24 AM    < end of copied text >

## 2018-08-13 NOTE — DIETITIAN INITIAL EVALUATION ADULT. - PROBLEM SELECTOR PLAN 1
Meets SIRS criteria with fever, tachycardia, elevated WBC, lactate 2.5, altered mental status, source likely UTI (UA positive nitrites)  -Admit to medicine  -s/p IV Rocephin x1, IV Vancomycin x1 in ED; no strong risk factors for MRSA. continue IV Rocephin  -s/p 2100mL NS bolus in ED; patient pulled IV during transfusion.   -Follow up repeat lactate  -Start IVF maintenance at 75cc/hr x 10 hours. Monitor for fluid overload  -Tylenol PRN for fever  -Follow up blood cultures x2, urine culture  -Follow up RVP

## 2018-08-13 NOTE — CHART NOTE - NSCHARTNOTEFT_GEN_A_CORE
Upon Nutritional Assessment by the Registered Dietitian your patient was determined to meet criteria / has evidence of the following diagnosis/diagnoses:          [ ]  Mild Protein Calorie Malnutrition        [x ]  Moderate Protein Calorie Malnutrition        [ ] Severe Protein Calorie Malnutrition        [ ] Unspecified Protein Calorie Malnutrition        [ ] Underweight / BMI <19        [ ] Morbid Obesity / BMI > 40      Findings as based on:  •  Comprehensive nutrition assessment and consultation  •  Calorie counts (nutrient intake analysis)  •  Food acceptance and intake status from observations by staff  •  Follow up  •  Patient education  •  Intervention secondary to interdisciplinary rounds  •   concerns  * poor po PTA, NPO for 4 days, severe muscle wasting temporal area/clavicle, moderate fat loss triceps    Treatment:    The following diet has been recommended:  Mechanical soft diet(Dys 2) with nectar liquids, Ensure Enlive 8 oz po BID    PROVIDER Section:     By signing this assessment you are acknowledging and agree with the diagnosis/diagnoses assigned by the Registered Dietitian    Comments:

## 2018-08-13 NOTE — SWALLOW BEDSIDE ASSESSMENT ADULT - ORAL PREPARATORY PHASE
Within functional limits/Reduced oral grading Decreased mastication ability Reduced oral grading/Decreased mastication ability

## 2018-08-13 NOTE — PROGRESS NOTE ADULT - ASSESSMENT
Ms. Maria is being treated for possible sepsis with broad-spectrum antibiotics. Her worsening mental status is suggested to be due to her metabolic derangement. Review of her telemetry reveals sinus rhythm with frequent atrial premature contractions. Review of her electrocardiography examination overnight does suggest short periods of atrial fibrillation possibly in part secondary to acute metabolic derangements. In any event conservative therapy is warranted and she is not likely to be a candidate for prolonged anticoagulation despite her elevated CHADS score.    - Continue telemetry remains in SR overnight mostly rate controlled with freq PACs range 70s with bursts to 108, no AF overnight.    - Continue full dose enoxaparin at present  - Will increase Metoprolol 25mg BID to 25mg q8H for better rate control.  Cont tele.  BP labile watch rate and BP closely.  - Antibiotics per medicine  - Echocardiography with normal LV systolic function. MAC with moderate mitral and tricuspid insufficiency,  Mild LAE and GHAZALA.  Left pleural effusion.   - Pt received >3L of IVF would monitor volume status closely with evidence of Left pleural effusion on TTE today.  Exam difficult lying comfortably would monitor closely.    - Monitor electrolytes, replete as needed.  Maintain K >4 Mag >2  - Further management can be dependent on her clinical course   - All other needs as per primary team.    Josselyn Salazar, ARISTIDES-C  Cardiology Ms. Maria is being treated for possible sepsis with broad-spectrum antibiotics. Her worsening mental status is suggested to be due to her metabolic derangement. Review of her telemetry reveals sinus rhythm with frequent atrial premature contractions. Review of her electrocardiography examination overnight does suggest short periods of atrial fibrillation possibly in part secondary to acute metabolic derangements. In any event conservative therapy is warranted and she is not likely to be a candidate for prolonged anticoagulation despite her elevated CHADS score.    - Continue telemetry remains in SR overnight mostly rate controlled with freq PACs range 70s with bursts to 108, no AF overnight.  She did have 20-30 minutes of svt  - Continue full dose enoxaparin at present  - Will increase Metoprolol 25mg BID to 25mg q8H for better rate control.  Cont tele.  BP labile watch rate and BP closely.  - Antibiotics per medicine  - Echocardiography with normal LV systolic function. MAC with moderate mitral and tricuspid insufficiency,  Mild LAE and GHAZALA.  Left pleural effusion.   - Pt received >3L of IVF would monitor volume status closely with evidence of Left pleural effusion on TTE today.  Exam difficult lying comfortably would monitor closely.    - Monitor electrolytes, replete as needed.  Maintain K >4 Mag >2  - Further management can be dependent on her clinical course   - All other needs as per primary team.    Josselyn Salazar NP-C  Cardiology

## 2018-08-13 NOTE — SWALLOW BEDSIDE ASSESSMENT ADULT - COMMENTS
89F with PMH of advanced dementia, HTN, HLD, TIA, GERD, Barretts esophagus, CAD  Pt awake, alert, confused, poor historian 1:1 CNA present bedside  Pt c andriy-pharyngeal phase dysphagia marked by decreased mastication of solids c delayed a-p lingual transport c untimely trigger of swallow c throat clear and cough reflex response c thin liquids indicative of aspiration  Spoke c RN and Dr Kurtz

## 2018-08-13 NOTE — GOALS OF CARE CONVERSATION - PERSONAL ADVANCE DIRECTIVE - CONVERSATION DETAILS
Reviewded advance directives with pts daughter as per previous MOLST, She states that there are no changes

## 2018-08-13 NOTE — PROGRESS NOTE ADULT - ATTENDING COMMENTS
1:1 observation for safety. F/u Psych Consult  TTE  PT evaluation   Swallow evaluation   Hypokalemia: Potassium repleted.

## 2018-08-13 NOTE — PROGRESS NOTE ADULT - ASSESSMENT
89F with PMH of advanced dementia, HTN, HLD, TIA, GERD, Barretts esophagus, CAD s/p (2 stents; 2002, 2012) admitted for sepsis 2/2 UTI, gram + bacteremia,  AMS likely encephalopathy vs hospital acquired delirium, SARA, and now new onset Afib with RVR

## 2018-08-13 NOTE — PROGRESS NOTE ADULT - SUBJECTIVE AND OBJECTIVE BOX
Patient is a 89y old  Female who presents with a chief complaint of sepsis 2/2 UTI (10 Aug 2018 15:35)       INTERVAL HPI/OVERNIGHT EVENTS: Seen and examined at bedside. On 1:1 observation. More awake today but not willing to answer questions.     MEDICATIONS  (STANDING):  cephalexin 500 milliGRAM(s) Oral four times a day  dextrose 5% + sodium chloride 0.9% with potassium chloride 20 mEq/L 1000 milliLiter(s) (60 mL/Hr) IV Continuous <Continuous>  enoxaparin Injectable 70 milliGRAM(s) SubCutaneous every 12 hours  levothyroxine Injectable 37.5 MICROGram(s) IV Push at bedtime  metoprolol tartrate 25 milliGRAM(s) Oral two times a day  potassium chloride  10 mEq/100 mL IVPB 10 milliEquivalent(s) IV Intermittent every 1 hour    MEDICATIONS  (PRN):  acetaminophen  Suppository 650 milliGRAM(s) Rectal every 6 hours PRN For Temp greater than 38 C (100.4 F)  haloperidol    Injectable 0.5 milliGRAM(s) IntraMuscular every 12 hours PRN Agitation and combativeness      Allergies    No Known Allergies    Intolerances        REVIEW OF SYSTEMS:  Unable to obtain, patient not willing to talk.  Vital Signs Last 24 Hrs  T(C): 37.4 (13 Aug 2018 07:50), Max: 37.8 (13 Aug 2018 05:10)  T(F): 99.4 (13 Aug 2018 07:50), Max: 100.1 (13 Aug 2018 05:10)  HR: 84 (13 Aug 2018 07:50) (71 - 111)  BP: 136/81 (13 Aug 2018 07:50) (111/80 - 188/77)  BP(mean): --  RR: 17 (13 Aug 2018 07:50) (17 - 22)  SpO2: 96% (13 Aug 2018 07:50) (95% - 97%)    PHYSICAL EXAM:  GENERAL: NAD, Awake  HEAD:  Atraumatic, Normocephalic  EYES: EOMI, PERRLA, conjunctiva and sclera clear  ENMT: No tonsillar erythema, exudates, or enlargement; Moist mucous membranes  NECK: Supple, No JVD, Normal thyroid  NERVOUS SYSTEM:  Awake, Confused but not agitated  CHEST/LUNG: Clear to auscultation bilaterally; No rales, rhonchi, wheezing, or rubs  HEART: IRRegular rate and rhythm; No murmurs, rubs, or gallops  ABDOMEN: Soft, Nontender, Nondistended; Bowel sounds present  EXTREMITIES:  2+ Peripheral Pulses, No clubbing, cyanosis, or edema  LYMPH: No lymphadenopathy noted  SKIN: No rashes or lesions    LABS:                        10.6   8.06  )-----------( 183      ( 13 Aug 2018 06:23 )             33.6     13 Aug 2018 06:23    144    |  109    |  18     ----------------------------<  130    3.2     |  23     |  1.10     Ca    8.0        13 Aug 2018 06:23        CAPILLARY BLOOD GLUCOSE        BLOOD CULTURE  08-11 @ 19:46   No growth to date.  --  --  08-10 @ 19:17   No growth to date.  --  Blood Culture PCR  08-10 @ 19:15   >100,000 CFU/ml Klebsiella pneumoniae  --  Klebsiella pneumoniae    RADIOLOGY & ADDITIONAL TESTS:    Imaging Personally Reviewed:  [ ] YES     Consultant(s) Notes Reviewed:      Care Discussed with Consultants/Other Providers:

## 2018-08-13 NOTE — PROGRESS NOTE ADULT - PROBLEM SELECTOR PLAN 2
- Suspect Pyelonephritis   - On Keflex now  - Continue IV fluids with potassium, since hypokalemia, until NPO  -Tylenol PRN for fever  -RVP negative  -Blood cultures repeat negative.   -ID Dr. Raymond following.

## 2018-08-13 NOTE — SWALLOW BEDSIDE ASSESSMENT ADULT - SWALLOW EVAL: RECOMMENDED FEEDING/EATING TECHNIQUES
check mouth frequently for oral residue/pocketing/crush medication (when feasible)/alternate food with liquid/position upright (90 degrees)/oral hygiene

## 2018-08-14 ENCOUNTER — TRANSCRIPTION ENCOUNTER (OUTPATIENT)
Age: 83
End: 2018-08-14

## 2018-08-14 LAB
ANION GAP SERPL CALC-SCNC: 8 MMOL/L — SIGNIFICANT CHANGE UP (ref 5–17)
BUN SERPL-MCNC: 18 MG/DL — SIGNIFICANT CHANGE UP (ref 7–23)
CALCIUM SERPL-MCNC: 8.2 MG/DL — LOW (ref 8.5–10.1)
CHLORIDE SERPL-SCNC: 106 MMOL/L — SIGNIFICANT CHANGE UP (ref 96–108)
CO2 SERPL-SCNC: 24 MMOL/L — SIGNIFICANT CHANGE UP (ref 22–31)
CREAT SERPL-MCNC: 1.2 MG/DL — SIGNIFICANT CHANGE UP (ref 0.5–1.3)
GLUCOSE SERPL-MCNC: 117 MG/DL — HIGH (ref 70–99)
HCT VFR BLD CALC: 32.6 % — LOW (ref 34.5–45)
HGB BLD-MCNC: 10.3 G/DL — LOW (ref 11.5–15.5)
MAGNESIUM SERPL-MCNC: 2 MG/DL — SIGNIFICANT CHANGE UP (ref 1.6–2.6)
MCHC RBC-ENTMCNC: 26.3 PG — LOW (ref 27–34)
MCHC RBC-ENTMCNC: 31.6 GM/DL — LOW (ref 32–36)
MCV RBC AUTO: 83.2 FL — SIGNIFICANT CHANGE UP (ref 80–100)
NRBC # BLD: 0 /100 WBCS — SIGNIFICANT CHANGE UP (ref 0–0)
PLATELET # BLD AUTO: 180 K/UL — SIGNIFICANT CHANGE UP (ref 150–400)
POTASSIUM SERPL-MCNC: 4 MMOL/L — SIGNIFICANT CHANGE UP (ref 3.5–5.3)
POTASSIUM SERPL-SCNC: 4 MMOL/L — SIGNIFICANT CHANGE UP (ref 3.5–5.3)
RBC # BLD: 3.92 M/UL — SIGNIFICANT CHANGE UP (ref 3.8–5.2)
RBC # FLD: 16.7 % — HIGH (ref 10.3–14.5)
SODIUM SERPL-SCNC: 138 MMOL/L — SIGNIFICANT CHANGE UP (ref 135–145)
WBC # BLD: 8.97 K/UL — SIGNIFICANT CHANGE UP (ref 3.8–10.5)
WBC # FLD AUTO: 8.97 K/UL — SIGNIFICANT CHANGE UP (ref 3.8–10.5)

## 2018-08-14 PROCEDURE — 99232 SBSQ HOSP IP/OBS MODERATE 35: CPT

## 2018-08-14 PROCEDURE — 99233 SBSQ HOSP IP/OBS HIGH 50: CPT

## 2018-08-14 RX ADMIN — Medication 250 MILLIGRAM(S): at 11:32

## 2018-08-14 RX ADMIN — Medication 25 MILLIGRAM(S): at 05:28

## 2018-08-14 RX ADMIN — ENOXAPARIN SODIUM 70 MILLIGRAM(S): 100 INJECTION SUBCUTANEOUS at 05:28

## 2018-08-14 RX ADMIN — ENOXAPARIN SODIUM 70 MILLIGRAM(S): 100 INJECTION SUBCUTANEOUS at 17:22

## 2018-08-14 RX ADMIN — Medication 37.5 MICROGRAM(S): at 00:00

## 2018-08-14 RX ADMIN — Medication 250 MILLIGRAM(S): at 17:22

## 2018-08-14 RX ADMIN — Medication 25 MILLIGRAM(S): at 14:56

## 2018-08-14 RX ADMIN — Medication 25 MILLIGRAM(S): at 21:38

## 2018-08-14 RX ADMIN — Medication 37.5 MICROGRAM(S): at 21:37

## 2018-08-14 RX ADMIN — Medication 250 MILLIGRAM(S): at 05:28

## 2018-08-14 NOTE — PROGRESS NOTE ADULT - PROBLEM SELECTOR PLAN 3
Klebsiella from urine cx, sensitive to current Rx.  Will change to PO keflex  10-14 days antibiotics in total reasonable.

## 2018-08-14 NOTE — PROGRESS NOTE ADULT - SUBJECTIVE AND OBJECTIVE BOX
Neurology follow up note    JOHN [id number]      Interval History:    Patient feels ok no new complaints.    MEDICATIONS    acetaminophen  Suppository 650 milliGRAM(s) Rectal every 6 hours PRN  cephalexin 250 milliGRAM(s) Oral four times a day  enoxaparin Injectable 70 milliGRAM(s) SubCutaneous every 12 hours  levothyroxine Injectable 37.5 MICROGram(s) IV Push at bedtime  metoprolol tartrate 25 milliGRAM(s) Oral every 8 hours  OLANZapine Injectable 2.5 milliGRAM(s) IntraMuscular every 6 hours PRN      Allergies    No Known Allergies    Intolerances            Vital Signs Last 24 Hrs  T(C): 36.3 (14 Aug 2018 11:25), Max: 37.6 (13 Aug 2018 16:06)  T(F): 97.3 (14 Aug 2018 11:25), Max: 99.7 (13 Aug 2018 16:06)  HR: 85 (14 Aug 2018 11:25) (77 - 130)  BP: 159/99 (14 Aug 2018 11:25) (151/91 - 188/96)  BP(mean): --  RR: 18 (14 Aug 2018 11:25) (17 - 20)  SpO2: 94% (14 Aug 2018 11:25) (94% - 99%)    REVIEW OF SYSTEMS:  Slightly limited.   Constitutional:  Positive history of fevers and chills.    Head:  No headaches.    Eyes:  No double vision or blurry vision.    Ears:  No ringing in the ears.    Neck:  No neck pain.    Respiratory:  No shortness of breath.    Cardiovascular:  No chest pain.    Abdomen:  No nausea, vomiting, or abdominal pain.    Extremities/Neurological:  No numbness or tingling.    Musculoskeletal:  Positive history of joint pain.    General:  Positive history of frequent falls and positive episodes of poor oral intak    NEUROLOGIC:  The patient is arousable to verbal stimuli.  Location was hospital, year was 2017, month was unknown, and was able to name objects such as a pen.      Extraocular movements were intact.  Pupils were equal, round, and reactive bilaterally, 3 mm to 2.      Speech was fluent.  Smile was symmetric.     Motor:  Bilateral upper and lower were 4-/5, would say age appropriate.     The patient could follow simple commands but had difficulty following complex commands.     GENERAL Exam: Nontoxic , No Acute Distress   	  HEENT:  normocephalic, atraumatic  		  LUNGS:  Decreased bilaterally  	  HEART: Normal S1S2   No murmur RRR        	  GI/ ABDOMEN:  Soft  Non tender    EXTREMITIES:   No Edema  No Clubbing  No Cyanosis     MUSCULOSKELETAL: Normal Range of Motion  	   SKIN: Normal  No Ecchymosis               LABS:  CBC Full  -  ( 14 Aug 2018 08:27 )  WBC Count : 8.97 K/uL  Hemoglobin : 10.3 g/dL  Hematocrit : 32.6 %  Platelet Count - Automated : 180 K/uL  Mean Cell Volume : 83.2 fl  Mean Cell Hemoglobin : 26.3 pg  Mean Cell Hemoglobin Concentration : 31.6 gm/dL  Auto Neutrophil # : x  Auto Lymphocyte # : x  Auto Monocyte # : x  Auto Eosinophil # : x  Auto Basophil # : x  Auto Neutrophil % : x  Auto Lymphocyte % : x  Auto Monocyte % : x  Auto Eosinophil % : x  Auto Basophil % : x      08-14    138  |  106  |  18  ----------------------------<  117<H>  4.0   |  24  |  1.20    Ca    8.2<L>      14 Aug 2018 08:27  Mg     2.0     08-14      Hemoglobin A1C:       Vitamin B12         RADIOLOGY    ANALYSIS AND PLAN:  This is a 89-year-old with an episode of change in mental status, history of frequent falls, history of TIA, and subdural hematomas.  1.	For change in mental status, possibly this could be secondary to underlying viral type syndrome versus underlying infectious type process.  2.	I would recommend Infectious Disease evaluation.  3.	Antibiotics as needed.  4.	For history of dementia, secondary to the patient's age, I would recommend supportive therapy.  5.	For history of questionable TIAs, I would recommend supportive therapy.  6.	For frequent falls, most likely secondary to age related changes, mechanical falls, and dementia.  7.	For history of subdural hematoma, it appeared to be secondary to an episode of fall with head trauma.  8.	S/S evaluation   9.	Monitor oral intake.  10.	Fall precautions.  11.	Physical Therapy.  12.	Spoke with the daughter, Mary, in great detail, she does understand that while in the hospital setting, the patient's mental status may deteriorate, especially towards the evening.  13.	Daughter's name is Mary, telephone number is [phone]. 8/13/18  14.	no new events      Thank you for the courtesy of this consultation.    Greater than 40 minutes spent in direct patient care reviewing  the notes, lab data/ imaging , discussion with multidisciplinary team.

## 2018-08-14 NOTE — PROGRESS NOTE ADULT - ATTENDING COMMENTS
Thank you for the courtesy of this referral.    I'll sign off at this time.     Johnnie Trujillo MD  [phone]

## 2018-08-14 NOTE — DISCHARGE NOTE ADULT - SECONDARY DIAGNOSIS.
Atrial fibrillation with RVR Delirium Dementia GERD (gastroesophageal reflux disease) Sepsis SARA (acute kidney injury)

## 2018-08-14 NOTE — DISCHARGE NOTE ADULT - CARE PROVIDERS DIRECT ADDRESSES
,[email or web address]

## 2018-08-14 NOTE — PROGRESS NOTE ADULT - ATTENDING COMMENTS
OOB as tolerated  D/C 1:1 observation and monitor closely and fall precautions  Continue antibiotics  D/C planning for am. Daughter refusing CARLYN.

## 2018-08-14 NOTE — DISCHARGE NOTE ADULT - CARE PROVIDER_API CALL
Fred Lockwood), Cardiovascular Disease  [address] [place] [address]  Phone: [phone]  Fax: [phone] Fred Lockwood), Cardiovascular Disease  [address] [place] [address]  Phone: [phone]  Fax: [phone]    Wojciech Szymanski), Neurology  [address] [place] [address]  Phone: [phone]  Fax: [phone]

## 2018-08-14 NOTE — DISCHARGE NOTE ADULT - PATIENT PORTAL LINK FT
You can access the [facility] Patient Portal, offered by [facility], by registering with the following website: [email or web address]

## 2018-08-14 NOTE — PROGRESS NOTE ADULT - SUBJECTIVE AND OBJECTIVE BOX
Patient is a 89y old  Female who presents with a chief complaint of sepsis 2/2 UTI (10 Aug 2018 15:35)     INTERVAL HPI/OVERNIGHT EVENTS: No new events. Patient awake and alert. Denies any chest pain, palpitation, sob or any other new symptoms, complaints     MEDICATIONS  (STANDING):  cephalexin 250 milliGRAM(s) Oral four times a day  enoxaparin Injectable 70 milliGRAM(s) SubCutaneous every 12 hours  levothyroxine Injectable 37.5 MICROGram(s) IV Push at bedtime  metoprolol tartrate 25 milliGRAM(s) Oral every 8 hours    MEDICATIONS  (PRN):  acetaminophen  Suppository 650 milliGRAM(s) Rectal every 6 hours PRN For Temp greater than 38 C (100.4 F)  OLANZapine Injectable 2.5 milliGRAM(s) IntraMuscular every 6 hours PRN Acute agitation      Allergies    No Known Allergies    Intolerances        REVIEW OF SYSTEMS:  All negative except as above   Vital Signs Last 24 Hrs  T(C): 37.2 (14 Aug 2018 07:57), Max: 37.6 (13 Aug 2018 16:06)  T(F): 98.9 (14 Aug 2018 07:57), Max: 99.7 (13 Aug 2018 16:06)  HR: 81 (14 Aug 2018 07:57) (77 - 130)  BP: 165/84 (14 Aug 2018 07:57) (151/91 - 188/96)  BP(mean): --  RR: 18 (14 Aug 2018 07:57) (17 - 20)  SpO2: 96% (14 Aug 2018 07:57) (95% - 99%)    PHYSICAL EXAM:  GENERAL: NAD, Awake, Alert   HEAD:  Atraumatic, Normocephalic  EYES: EOMI, PERRLA, conjunctiva and sclera clear  ENMT: No tonsillar erythema, exudates, or enlargement; Moist mucous membranes  NECK: Supple, No JVD, Normal thyroid  NERVOUS SYSTEM:  Alert & Awake, grossly non focal neuro exam   CHEST/LUNG: Clear to auscultation bilaterally; No rales, rhonchi, wheezing, or rubs  HEART: S1S2+, Regular rate and Rhythm   ABDOMEN: Soft, Nontender, Nondistended; Bowel sounds present  EXTREMITIES:  2+ Peripheral Pulses, No clubbing, cyanosis, or edema  LYMPH: No lymphadenopathy noted      LABS:      Ca    8.0        13 Aug 2018 06:23        CAPILLARY BLOOD GLUCOSE        BLOOD CULTURE  08-11 @ 19:46   No growth to date.  --  --  08-10 @ 19:17   No growth to date.  --  Blood Culture PCR  08-10 @ 19:15   >100,000 CFU/ml Klebsiella pneumoniae  --  Klebsiella pneumoniae    RADIOLOGY & ADDITIONAL TESTS:    Imaging Personally Reviewed:  [ ] YES     Consultant(s) Notes Reviewed:      Care Discussed with Consultants/Other Providers:

## 2018-08-14 NOTE — DISCHARGE NOTE ADULT - MEDICATION SUMMARY - MEDICATIONS TO TAKE
I will START or STAY ON the medications listed below when I get home from the hospital:    aspirin 325 mg oral tablet  -- 1 tab(s) by mouth once a day  -- Indication: For Afib    metoprolol tartrate 25 mg oral tablet  -- 1 tab(s) by mouth every 8 hours  -- Indication: For Afib/htn    cephalexin 250 mg oral capsule  -- 1 cap(s) by mouth 4 times a day x 5 days  -- Indication: For UTI (urinary tract infection)    RABEprazole 20 mg oral tablet, extended release  -- 1 cap(s) by mouth 3 times a week  -- Indication: For Home Med    Synthroid 75 mcg (0.075 mg) oral tablet  -- 1 tab(s) by mouth once a day  -- Indication: For Hypothyroid    Vitamin B12  -- Indication: For Home Supplement    Vitamin D3 1000 intl units oral tablet  -- 1 tab(s) by mouth once a day  -- Indication: For Home Supplement

## 2018-08-14 NOTE — CHART NOTE - NSCHARTNOTEFT_GEN_A_CORE
Called by RN for BP of 174/99. Patient asymptomatic. Repeat manual /96. Patient given morning medication of metoprolol was given, BP rechecked within 30 minutes, repeat /91. Continue to monitor BP.

## 2018-08-14 NOTE — DISCHARGE NOTE ADULT - HOSPITAL COURSE
89F with PMH of advanced dementia, HTN, HLD, TIA, GERD, Barretts esophagus, CAD s/p (2 stents; 2002, 2012) admitted for sepsis 2/2 UTI, gram + bacteremia,  AMS likely  hospital acquired delirium, SARA, and now new onset Afib with RVR. Patient was on 1:1 observation for safety. And was seen by Neuro and Psych. Patient was started on IV antibiotics for UTI. Blood cultures were negative, one sample came back positive for alpha hemolytic Strep that was likely contaminant per ID. Patient had swallow evaluation and recommended dysphagia 2 with Nectar thick liquid diet. Antibiotics switched to oral. Mental status improved to baseline. Patient also found to have new onset atrial fibrillation with RVR that improved with metoprolol and was started on Lovenox here but patient not a candidate for long term anticoagulation secondary to advance dementia and fall risk and risk of bleeding. Daughter wants to bring patient home, and not interested in [facility]. 89F with PMH of advanced dementia, HTN, HLD, TIA, GERD, Barretts esophagus, CAD s/p (2 stents; 2002, 2012) admitted for sepsis 2/2 UTI, gram + bacteremia,  AMS likely  hospital acquired delirium, SARA, and now new onset Afib with RVR. Patient was on 1:1 observation for safety. And was seen by Neuro and Psych. Patient was started on IV antibiotics for UTI. Blood cultures were negative, one sample came back positive for alpha hemolytic Strep that was likely contaminant per ID. Patient had swallow evaluation and recommended dysphagia 2 with Nectar thick liquid diet. Antibiotics switched to oral. Mental status improved to baseline. Patient also found to have new onset atrial fibrillation with RVR that improved with metoprolol and was started on Lovenox here but patient not a candidate for long term anticoagulation secondary to advance dementia and fall risk and risk of bleeding but ok with aspirin 325mg po daily. Patient to go to [facility] and will f/u with cardiologist, PCP as out patient. 89F with PMH of advanced dementia, HTN, HLD, TIA, GERD, Barretts esophagus, CAD s/p (2 stents; 2002, 2012) admitted for sepsis 2/2 UTI, gram + bacteremia,  AMS likely  hospital acquired delirium and suspected metabolic encephalopathy secondary to pyelonephritis,  SARA, and now new onset Afib with RVR. Patient was on 1:1 observation for safety. And was seen by Neuro and Psych. Patient was started on IV antibiotics for UTI. Blood cultures were negative, one sample came back positive for alpha hemolytic Strep that was likely contaminant per ID. Patient had swallow evaluation and recommended dysphagia 2 diet. Antibiotics switched to oral. Mental status improved to baseline. Patient also found to have new onset atrial fibrillation with RVR that improved with metoprolol and was started on Lovenox here but patient not a candidate for long term anticoagulation secondary to advance dementia and fall risk and risk of bleeding but ok with aspirin 325mg po daily. Patient to go to [facility] and will f/u with cardiologist, PCP as out patient.

## 2018-08-14 NOTE — PROGRESS NOTE ADULT - SUBJECTIVE AND OBJECTIVE BOX
NP Progress Note     [facility] -- Estella Johnson, Mandie, Aiyana, Guilherme Hurst Savella  Office # [phone]      Follow Up:      HPI:  89F with PMH of advanced dementia, HTN, HLD, TIA, GERD, Barretts esophagus, CAD s/p (2 stents; , ) presents from Urgent Care. History obtained from Daughters, Mary and Carey, over the phone. Patient unable to provide any history; removed IV access and unable to respond to simple questions or provider her name. Per daughter, a few nights ago patient slipped and fell on a water bottle, EMS was called, family refused transfer to ED and patient was lifted back to bed. Daughter reports patient is constantly in the bathroom, "either sleeping or peeing." Daughters reports patient has hx of chronic UTIs "because she touches herself." Was recently prescribed Oxybutynin by PCP but only took medication for 4 days and discontinued on Tuesday because lack of improvement of symptoms of urgency. For the past three days, patient has had increased moaning (moans at baseline), sneezing, cough with productive sputum and not answering simple questions or following simple commands. Also reports patient has been sleeping a lot more. They went to an Urgent Care and report patient became severely agitated with elevated HR. Daughter reports patient has anxiety about hospitalization causing increased HR. At baseline patient is A&Ox2 (knows name, , location, surrounds, doesn't know year). Patient ambulates unsteady and tries to "run", refusing to use a cane or walker. Has two aides for 9 hours, 6 days ago. Of note, per daughter patient was recently admitted to [facility] on 2018 for subdural hematoma, and several rib fractures after a fall. Patient went to rehab and returned home 2018, and symptoms of dementia have worsened. Admit to decreased appetite and decreased fluid intake. Daughter, Mary, HCP, report patient is a DNR/DNI.     In the ED: rectal 101.7,  -> 104, /91, RR 20, SpO2 94% RA. WBC 12.43, lactate 2.5, Na 131, Cr 1.4, UA positive for nitrites. Chest Xray: no acute findings. EKG sinus tachycardia at 145. HR improved to 104 without intervention. Received IV Vancomycin x1, IV Rocephin x1, 2100mL NS bolus, Tylenol. (10 Aug 2018 15:35)        Subjective/Observations: Pt. seen and examined and evaluated. Pt. resting comfortably in bed in NAD, with no respiratory distress, no chest pain, dyspnea, palpitations, PND, or orthopnea.      REVIEW OF SYSTEMS: All other review of systems is negative unless indicated above    PAST MEDICAL & SURGICAL HISTORY:  CAD (coronary artery disease)  TIA (transient ischemic attack)  GERD (gastroesophageal reflux disease)  Damian esophagus  Arthritis  Hypertension  High cholesterol  Dementia  Hypothyroid  Cataract  S/P cholecystectomy      MEDICATIONS  (STANDING):  cephalexin 250 milliGRAM(s) Oral four times a day  enoxaparin Injectable 70 milliGRAM(s) SubCutaneous every 12 hours  levothyroxine Injectable 37.5 MICROGram(s) IV Push at bedtime  metoprolol tartrate 25 milliGRAM(s) Oral every 8 hours    MEDICATIONS  (PRN):  acetaminophen  Suppository 650 milliGRAM(s) Rectal every 6 hours PRN For Temp greater than 38 C (100.4 F)  OLANZapine Injectable 2.5 milliGRAM(s) IntraMuscular every 6 hours PRN Acute agitation      Allergies: No Known Allergies    Vital Signs Last 24 Hrs  T(C): 36.3 (14 Aug 2018 11:25), Max: 37.6 (13 Aug 2018 16:06)  T(F): 97.3 (14 Aug 2018 11:25), Max: 99.7 (13 Aug 2018 16:06)  HR: 85 (14 Aug 2018 11:25) (77 - 130)  BP: 159/99 (14 Aug 2018 11:25) (151/91 - 188/96)  BP(mean): --  RR: 18 (14 Aug 2018 11:25) (17 - 20)  SpO2: 94% (14 Aug 2018 11:25) (94% - 99%)    I&O's Summary    13 Aug 2018 07:01  -  14 Aug 2018 07:00  --------------------------------------------------------  IN: 1070 mL / OUT: 0 mL / NET: 1070 mL              LABS: All Labs Reviewed:                        10.3   8.97  )-----------( 180      ( 14 Aug 2018 08:27 )             32.6                14 Aug 2018 08:27    138    |  106    |  18     ----------------------------<  117    4.0     |  24     |  1.20   Ca    8.2        14 Aug 2018 08:27  Mg     2.0       14 Aug 2018 08:27    Echo: TTE Echo Doppler w/o Cont (18 @ 11:57) >  LVEF: 65-70%  RVSP: 47mmHg    FINDINGS  Left Ventricle: The endocardium is not well-visualized. Grossly, there is   normal left ventricular systolic function.  Aortic Valve: Calcified trileaflet aortic valve. Minimal aortic   insufficiency.  Mitral Valve: Mitral annular calcification and calcified mitral valve   leaflets. Moderate mitral insufficiency.  Tricuspid Valve: Normal tricuspid valve. Moderate tricuspid insufficiency.  Pulmonic Valve: Normal pulmonic valve. Mild pulmonic insufficiency.  Left Atrium: Mildly enlarged  Right Ventricle: Normal right ventricular size and systolic function.  Right Atrium: Mildly enlarged  Pericardium/Pleura: Normal pericardium with no pericardial effusion. Left   pleural effusion.      CONCLUSIONS:  Technically difficult and limited study.     1.  The endocardium is not well-visualized. Grossly, there is normal left   ventricular systolic function.   2.  Calcified trileaflet aortic valve with minimal aortic insufficiency.   3.  Mitral annular calcification and calcified mitral leaflets with   moderate mitral insufficiency.   4.  Normal tricuspid valve moderate tricuspid insufficiency.   5.  Mild left atrial enlargement   6.  Normal right ventricular size and systolic function. Mild right   atrial enlargement  7.  Left pleural effusion.      Stress Testing:     Cath:    Imaging:    Interpretation of Telemetry: Overnight on telemetry Sr 60's-70's PVC's       Physical Exam:  Appearance: [ ] Normal  [ ] abnormal [ ] NAD   Eyes: [ ] PERRL [ ] EOMI  HEENT: [ ] Normal [ ] Abnormal oral mucosa [ ]NC/AT  Cardiovascular: [ ] S1 [ ] S2 [ ] RRR [ ] m/r/g [ ]edema [ ] JVP  Procedural Access Site: [ ]  hematoma [ ] tender to palpation [ ] 2+ pulse [ ] bruit [ ] Ecchymosis  Respiratory: [ ] Clear to auscultation bilaterally  Gastrointestinal: [ ] Soft [ ] tenderness[ ] distension [ ] BS  Musculoskeletal: [ ] clubbing [ ] joint deformity   Neurologic: [ ] Non-focal  Lymphatic: [ ] lymphadenopathy  Psychiatry: [ ] AAOx3  [ ] confused [ ] disoriented [ ] Mood & affect appropriate  Skin: [ ]  rashes [ ] ecchymoses [ ] cyanosis NP Progress Note     [facility] -- Estella Johnson, Mandie, Aiyana, Guilherme Hurst Savella  Office # [phone]      Follow Up:      HPI:  89F with PMH of advanced dementia, HTN, HLD, TIA, GERD, Barretts esophagus, CAD s/p (2 stents; , ) presents from Urgent Care. History obtained from Daughters, Mary and Carey, over the phone. Patient unable to provide any history; removed IV access and unable to respond to simple questions or provider her name. Per daughter, a few nights ago patient slipped and fell on a water bottle, EMS was called, family refused transfer to ED and patient was lifted back to bed. Daughter reports patient is constantly in the bathroom, "either sleeping or peeing." Daughters reports patient has hx of chronic UTIs "because she touches herself." Was recently prescribed Oxybutynin by PCP but only took medication for 4 days and discontinued on Tuesday because lack of improvement of symptoms of urgency. For the past three days, patient has had increased moaning (moans at baseline), sneezing, cough with productive sputum and not answering simple questions or following simple commands. Also reports patient has been sleeping a lot more. They went to an Urgent Care and report patient became severely agitated with elevated HR. Daughter reports patient has anxiety about hospitalization causing increased HR. At baseline patient is A&Ox2 (knows name, , location, surrounds, doesn't know year). Patient ambulates unsteady and tries to "run", refusing to use a cane or walker. Has two aides for 9 hours, 6 days ago. Of note, per daughter patient was recently admitted to [facility] on 2018 for subdural hematoma, and several rib fractures after a fall. Patient went to rehab and returned home 2018, and symptoms of dementia have worsened. Admit to decreased appetite and decreased fluid intake. Daughter, Mary, HCP, report patient is a DNR/DNI.     In the ED: rectal 101.7,  -> 104, /91, RR 20, SpO2 94% RA. WBC 12.43, lactate 2.5, Na 131, Cr 1.4, UA positive for nitrites. Chest Xray: no acute findings. EKG sinus tachycardia at 145. HR improved to 104 without intervention. Received IV Vancomycin x1, IV Rocephin x1, 2100mL NS bolus, Tylenol. (10 Aug 2018 15:35)        Subjective/Observations: Pt. seen and examined and evaluated. Pt. resting comfortably in chair in NAD, with no respiratory distress, no chest pain, dyspnea, palpitations, PND, or orthopnea.      REVIEW OF SYSTEMS: All other review of systems is negative unless indicated above    PAST MEDICAL & SURGICAL HISTORY:  CAD (coronary artery disease)  TIA (transient ischemic attack)  GERD (gastroesophageal reflux disease)  Damian esophagus  Arthritis  Hypertension  High cholesterol  Dementia  Hypothyroid  Cataract  S/P cholecystectomy      MEDICATIONS  (STANDING):  cephalexin 250 milliGRAM(s) Oral four times a day  enoxaparin Injectable 70 milliGRAM(s) SubCutaneous every 12 hours  levothyroxine Injectable 37.5 MICROGram(s) IV Push at bedtime  metoprolol tartrate 25 milliGRAM(s) Oral every 8 hours    MEDICATIONS  (PRN):  acetaminophen  Suppository 650 milliGRAM(s) Rectal every 6 hours PRN For Temp greater than 38 C (100.4 F)  OLANZapine Injectable 2.5 milliGRAM(s) IntraMuscular every 6 hours PRN Acute agitation      Allergies: No Known Allergies    Vital Signs Last 24 Hrs  T(C): 36.3 (14 Aug 2018 11:25), Max: 37.6 (13 Aug 2018 16:06)  T(F): 97.3 (14 Aug 2018 11:25), Max: 99.7 (13 Aug 2018 16:06)  HR: 85 (14 Aug 2018 11:25) (77 - 130)  BP: 159/99 (14 Aug 2018 11:25) (151/91 - 188/96)  BP(mean): --  RR: 18 (14 Aug 2018 11:25) (17 - 20)  SpO2: 94% (14 Aug 2018 11:25) (94% - 99%)    I&O's Summary    13 Aug 2018 07:01  -  14 Aug 2018 07:00  --------------------------------------------------------  IN: 1070 mL / OUT: 0 mL / NET: 1070 mL              LABS: All Labs Reviewed:                        10.3   8.97  )-----------( 180      ( 14 Aug 2018 08:27 )             32.6                14 Aug 2018 08:27    138    |  106    |  18     ----------------------------<  117    4.0     |  24     |  1.20   Ca    8.2        14 Aug 2018 08:27  Mg     2.0       14 Aug 2018 08:27    Echo: TTE Echo Doppler w/o Cont (18 @ 11:57) >  LVEF: 65-70%  RVSP: 47mmHg    FINDINGS  Left Ventricle: The endocardium is not well-visualized. Grossly, there is   normal left ventricular systolic function.  Aortic Valve: Calcified trileaflet aortic valve. Minimal aortic   insufficiency.  Mitral Valve: Mitral annular calcification and calcified mitral valve   leaflets. Moderate mitral insufficiency.  Tricuspid Valve: Normal tricuspid valve. Moderate tricuspid insufficiency.  Pulmonic Valve: Normal pulmonic valve. Mild pulmonic insufficiency.  Left Atrium: Mildly enlarged  Right Ventricle: Normal right ventricular size and systolic function.  Right Atrium: Mildly enlarged  Pericardium/Pleura: Normal pericardium with no pericardial effusion. Left   pleural effusion.      CONCLUSIONS:  Technically difficult and limited study.     1.  The endocardium is not well-visualized. Grossly, there is normal left   ventricular systolic function.   2.  Calcified trileaflet aortic valve with minimal aortic insufficiency.   3.  Mitral annular calcification and calcified mitral leaflets with   moderate mitral insufficiency.   4.  Normal tricuspid valve moderate tricuspid insufficiency.   5.  Mild left atrial enlargement   6.  Normal right ventricular size and systolic function. Mild right   atrial enlargement  7.  Left pleural effusion.      Stress Testing:     Cath:    Imaging:    Interpretation of Telemetry: Overnight on telemetry Sr 60's-70's PVC's       Physical Exam:  Appearance: [ ] Normal  [ ] abnormal [X ] NAD   Eyes: [ ] PERRL [ ] EOMI  HEENT: [ ] Normal [ ] Abnormal oral mucosa [ ]NC/AT  Cardiovascular: [X ] S1 [X ] S2 [ ] RRR [ ] m/r/g [ ]edema [ ] JVP  Procedural Access Site: [ ]  hematoma [ ] tender to palpation [ ] 2+ pulse [ ] bruit [ ] Ecchymosis  Respiratory: [X ] Clear to auscultation bilaterally  Gastrointestinal: [ ] Soft [ ] tenderness[ ] distension [ ] BS  Musculoskeletal: [ ] clubbing [ ] joint deformity   Neurologic: [ ] Non-focal  Lymphatic: [ ] lymphadenopathy  Psychiatry: [X ] AAOx1-2  [ ] confused [ ] disoriented [ ] Mood & affect appropriate  Skin: [ ]  rashes [ ] ecchymoses [ ] cyanosis NP Progress Note     [facility] -- Estella Johnson, Mandie, Aiyana, Guilherme Hurst Savella  Office # [phone]      Follow Up:  AF    HPI:  89F with PMH of advanced dementia, HTN, HLD, TIA, GERD, Barretts esophagus, CAD s/p (2 stents; , ) presents from Urgent Care. History obtained from Daughters, Mary and Carey, over the phone. Patient unable to provide any history; removed IV access and unable to respond to simple questions or provider her name. Per daughter, a few nights ago patient slipped and fell on a water bottle, EMS was called, family refused transfer to ED and patient was lifted back to bed. Daughter reports patient is constantly in the bathroom, "either sleeping or peeing." Daughters reports patient has hx of chronic UTIs "because she touches herself." Was recently prescribed Oxybutynin by PCP but only took medication for 4 days and discontinued on Tuesday because lack of improvement of symptoms of urgency. For the past three days, patient has had increased moaning (moans at baseline), sneezing, cough with productive sputum and not answering simple questions or following simple commands. Also reports patient has been sleeping a lot more. They went to an Urgent Care and report patient became severely agitated with elevated HR. Daughter reports patient has anxiety about hospitalization causing increased HR. At baseline patient is A&Ox2 (knows name, , location, surrounds, doesn't know year). Patient ambulates unsteady and tries to "run", refusing to use a cane or walker. Has two aides for 9 hours, 6 days ago. Of note, per daughter patient was recently admitted to [facility] on 2018 for subdural hematoma, and several rib fractures after a fall. Patient went to rehab and returned home 2018, and symptoms of dementia have worsened. Admit to decreased appetite and decreased fluid intake. Daughter, Mary, HCP, report patient is a DNR/DNI.     In the ED: rectal 101.7,  -> 104, /91, RR 20, SpO2 94% RA. WBC 12.43, lactate 2.5, Na 131, Cr 1.4, UA positive for nitrites. Chest Xray: no acute findings. EKG sinus tachycardia at 145. HR improved to 104 without intervention. Received IV Vancomycin x1, IV Rocephin x1, 2100mL NS bolus, Tylenol. (10 Aug 2018 15:35)        Subjective/Observations: Pt. seen and examined and evaluated. Pt. resting comfortably in chair in NAD, with no respiratory distress, no chest pain, dyspnea, palpitations, PND, or orthopnea.      REVIEW OF SYSTEMS: Limited 2/2 comorbidities (dementia)    PAST MEDICAL & SURGICAL HISTORY:  CAD (coronary artery disease)  TIA (transient ischemic attack)  GERD (gastroesophageal reflux disease)  Damian esophagus  Arthritis  Hypertension  High cholesterol  Dementia  Hypothyroid  Cataract  S/P cholecystectomy      MEDICATIONS  (STANDING):  cephalexin 250 milliGRAM(s) Oral four times a day  enoxaparin Injectable 70 milliGRAM(s) SubCutaneous every 12 hours  levothyroxine Injectable 37.5 MICROGram(s) IV Push at bedtime  metoprolol tartrate 25 milliGRAM(s) Oral every 8 hours    MEDICATIONS  (PRN):  acetaminophen  Suppository 650 milliGRAM(s) Rectal every 6 hours PRN For Temp greater than 38 C (100.4 F)  OLANZapine Injectable 2.5 milliGRAM(s) IntraMuscular every 6 hours PRN Acute agitation      Allergies: No Known Allergies    Vital Signs Last 24 Hrs  T(C): 36.3 (14 Aug 2018 11:25), Max: 37.6 (13 Aug 2018 16:06)  T(F): 97.3 (14 Aug 2018 11:25), Max: 99.7 (13 Aug 2018 16:06)  HR: 85 (14 Aug 2018 11:25) (77 - 130)  BP: 159/99 (14 Aug 2018 11:25) (151/91 - 188/96)  BP(mean): --  RR: 18 (14 Aug 2018 11:25) (17 - 20)  SpO2: 94% (14 Aug 2018 11:25) (94% - 99%)    I&O's Summary    13 Aug 2018 07:01  -  14 Aug 2018 07:00  --------------------------------------------------------  IN: 1070 mL / OUT: 0 mL / NET: 1070 mL              LABS: All Labs Reviewed:                        10.3   8.97  )-----------( 180      ( 14 Aug 2018 08:27 )             32.6                14 Aug 2018 08:27    138    |  106    |  18     ----------------------------<  117    4.0     |  24     |  1.20   Ca    8.2        14 Aug 2018 08:27  Mg     2.0       14 Aug 2018 08:27    Echo: TTE Echo Doppler w/o Cont (18 @ 11:57) >  LVEF: 65-70%  RVSP: 47mmHg    FINDINGS  Left Ventricle: The endocardium is not well-visualized. Grossly, there is   normal left ventricular systolic function.  Aortic Valve: Calcified trileaflet aortic valve. Minimal aortic   insufficiency.  Mitral Valve: Mitral annular calcification and calcified mitral valve   leaflets. Moderate mitral insufficiency.  Tricuspid Valve: Normal tricuspid valve. Moderate tricuspid insufficiency.  Pulmonic Valve: Normal pulmonic valve. Mild pulmonic insufficiency.  Left Atrium: Mildly enlarged  Right Ventricle: Normal right ventricular size and systolic function.  Right Atrium: Mildly enlarged  Pericardium/Pleura: Normal pericardium with no pericardial effusion. Left   pleural effusion.      CONCLUSIONS:  Technically difficult and limited study.     1.  The endocardium is not well-visualized. Grossly, there is normal left   ventricular systolic function.   2.  Calcified trileaflet aortic valve with minimal aortic insufficiency.   3.  Mitral annular calcification and calcified mitral leaflets with   moderate mitral insufficiency.   4.  Normal tricuspid valve moderate tricuspid insufficiency.   5.  Mild left atrial enlargement   6.  Normal right ventricular size and systolic function. Mild right   atrial enlargement  7.  Left pleural effusion.      Stress Testing:     Cath:    Imaging:    Interpretation of Telemetry: Overnight on telemetry Sr 60's-70's PVC's       Physical Exam:  Appearance: [ ] Normal  [ ] abnormal [X ] NAD   Eyes: [ ] PERRL [ ] EOMI  HEENT: [ ] Normal [ ] Abnormal oral mucosa [ ]NC/AT  Cardiovascular: [X ] S1 [X ] S2 [ ] RRR [ ] m/r/g [ ]edema [ ] JVP  Procedural Access Site: [ ]  hematoma [ ] tender to palpation [ ] 2+ pulse [ ] bruit [ ] Ecchymosis  Respiratory: [X ] Clear to auscultation bilaterally  Gastrointestinal: [ ] Soft [ ] tenderness[ ] distension [ ] BS  Musculoskeletal: [ ] clubbing [ ] joint deformity   Neurologic: [ ] Non-focal  Lymphatic: [ ] lymphadenopathy  Psychiatry: [X ] AAOx1-2  [ ] confused [ ] disoriented [ ] Mood & affect appropriate  Skin: [ ]  rashes [ ] ecchymoses [ ] cyanosis

## 2018-08-14 NOTE — PROGRESS NOTE ADULT - PROBLEM [employer]AN 6
-Resolved   -Daughter reports mild CKD hx, unaware of Cr baseline. Likely due to dehydration  -s/p 2100mL NS bolus in ED, start IVF 75cc/hr x 10 hours  -Follow up AM BUN/Cr Resolved.  Off IV Fluids  Monitor renal function

## 2018-08-14 NOTE — DISCHARGE NOTE ADULT - ADDITIONAL INSTRUCTIONS
Please f/u with cardiologist within 1 week  f/u with PCP  Aspiration precautions  Dysphagia 2 diet with Nectar Consistency

## 2018-08-14 NOTE — PROGRESS NOTE ADULT - SUBJECTIVE AND OBJECTIVE BOX
[facility], Division of Infectious Diseases  NATHAN Goodrich A. Lee  [phone]    Name: JOHN RIVERA  Age: 89y  Gender: Female  MRN: [id number]    Interval History--  Notes reviewed. SLeeping, rouses easily. Oriented to self only. No complaints for whatever it may be worth.     Past Medical History--  CAD (coronary artery disease)  TIA (transient ischemic attack)  GERD (gastroesophageal reflux disease)  Damian esophagus  Arthritis  Hypertension  High cholesterol  Dementia  Hypothyroid  Cataract  S/P cholecystectomy      For details regarding the patient's social history, family history, and other miscellaneous elements, please refer the initial infectious diseases consultation and/or the admitting history and physical examination for this admission.    Allergies    No Known Allergies    Intolerances        Medications--  Antibiotics:  cephalexin 250 milliGRAM(s) Oral four times a day    Immunologic:    Other:  acetaminophen  Suppository PRN  enoxaparin Injectable  levothyroxine Injectable  metoprolol tartrate  OLANZapine Injectable PRN      Review of Systems--  Review of systems unable due to dementia.     Physical Examination--  Vital Signs: T(F): 97.3 (08-14-18 @ 11:25), Max: 99.7 (08-13-18 @ 16:06)  HR: 85 (08-14-18 @ 11:25)  BP: 159/99 (08-14-18 @ 11:25)  RR: 18 (08-14-18 @ 11:25)  SpO2: 94% (08-14-18 @ 11:25)  Wt(kg): --  General: Nontoxic-appearing Female in no acute distress.  HEENT: AT/NC. Anicteric. Conj pink/moist. Scab upper lip ?HSV   Neck: Not rigid. No sense of mass.  Nodes: None palpable.  Lungs: Grossly cear bilaterally without rales, wheezing or rhonchi  Heart: Regular rate and rhythm. No Murmur. No rub. No gallop. No palpable thrill.  Abdomen: Bowel sounds present and normoactive. Soft. Nondistended. Nontender. No sense of mass. No organomegaly.  Extremities: No cyanosis or clubbing. 1+ Edema.   Skin: Warm. Dry. Good turgor. No rash. No vasculitic stigmata.  Psychiatric: Unable        Laboratory Studies--  CBC                        10.3   8.97  )-----------( 180      ( 14 Aug 2018 08:27 )             32.6       Chemistries  08-14    138  |  106  |  18  ----------------------------<  117<H>  4.0   |  24  |  1.20    Ca    8.2<L>      14 Aug 2018 08:27  Mg     2.0     08-14        Culture Data    Culture - Blood (collected 11 Aug 2018 19:46)  Source: .Blood Blood-Venous  Preliminary Report (12 Aug 2018 20:01):    No growth to date.    Culture - Blood (collected 11 Aug 2018 19:46)  Source: .Blood Blood-Peripheral  Preliminary Report (12 Aug 2018 20:01):    No growth to date.    Culture - Blood (collected 10 Aug 2018 19:17)  Source: .Blood Blood-Peripheral  Gram Stain (11 Aug 2018 11:21):    Growth in aerobic bottle: Gram positive cocci in pairs    Growth in anaerobic bottle: Gram positive cocci in pairs  Final Report (12 Aug 2018 08:31):    Growth in aerobic and anaerobic bottles: Alpha hemolytic strep    (not Strep. pneumoniae or Enterococcus)    Single set isolate, possible contaminant. Contact    Microbiology if susceptibility testing clinically    indicated.    "Due to technical problems, Proteus sp. will Not be reported as part of    the BCID panel until further notice"    ***Blood Panel PCR results on this specimen are available    approximately 3 hours after the Gram stain result.***    Gram stain, PCR, and/or culture results may not always    correspond due to difference in methodologies.    ************************************************************    This PCR assay was performed using "[employer]".    The following targets are tested for: Enterococcus,    vancomycin resistant enterococci, Listeria monocytogenes,    coagulase negative staphylococci, S. aureus,    methicillin resistant S. aureus, Streptococcus agalactiae    (Group B), S. pneumoniae, S. pyogenes (Group A),    Acinetobacter baumannii, Enterobacter cloacae, E. coli,    Klebsiella oxytoca, K. pneumoniae, Proteus sp.,    Serratia marcescens, Haemophilus influenzae,    Neisseria meningitidis, Pseudomonas aeruginosa, Candida    albicans, C. glabrata, C krusei, C parapsilosis,    C. tropicalis and the KPC resistance gene.  Organism: Blood Culture PCR (12 Aug 2018 08:31)  Organism: Blood Culture PCR (12 Aug 2018 08:31)    Culture - Blood (collected 10 Aug 2018 19:17)  Source: .Blood Blood-Peripheral  Preliminary Report (11 Aug 2018 20:01):    No growth to date.    Culture - Urine (collected 10 Aug 2018 19:15)  Source: .Urine Catheterized  Final Report (12 Aug 2018 18:17):    >100,000 CFU/ml Klebsiella pneumoniae  Organism: Klebsiella pneumoniae (12 Aug 2018 18:17)  Organism: Klebsiella pneumoniae (12 Aug 2018 18:17)

## 2018-08-14 NOTE — PROGRESS NOTE ADULT - PROBLEM SELECTOR PLAN 4
-Multifactorial likely encephalopathy vs hospital acquired delirium in setting of  advanced dementia   -Patient difficult to redirect and occasionally agitated, discussed with daughter need for constant observation and potential need for Haldol v Zyprexa, daughter aware and in agreement.  - CT head negative  -Neuro consult Dr. KELLY Mg called and consult appreciated   -Might need Psych consult??  -1:1 observation for safety -Multifactorial likely hospital acquired delirium in setting of  advanced dementia   -Patient seems to be back to baseline mental status  -Monitor closely off of 1:1 observation  -Zyprexa PRn per Psych  -Fall precautions

## 2018-08-14 NOTE — PROGRESS NOTE ADULT - ASSESSMENT
Ms. Maria is being treated for possible sepsis with broad-spectrum antibiotics. Her worsening mental status is suggested to be due to her metabolic derangement. Review of her telemetry reveals sinus rhythm with frequent atrial premature contractions. Review of her electrocardiography examination overnight does suggest short periods of atrial fibrillation possibly in part secondary to acute metabolic derangements. In any event conservative therapy is warranted and she is not likely to be a candidate for prolonged anticoagulation despite her elevated CHADS score.    - Continues on telemetry overnight to remain in SR 60's-70's with PACs  - Continue full dose enoxaparin at present  - Metoprolol increased to 25mg q8H HR  -188 DBP 62-99  - Antibiotics per medicine  - Echocardiography with normal LV systolic function. MAC with moderate mitral and tricuspid insufficiency,  Mild LAE and GHAZALA.  Left pleural effusion.   - Monitor electrolytes, replete as needed.  Maintain K >4 Mag >2  - Further management can be dependent on her clinical course   - All other needs as per primary team. Ms. Maria is being treated for possible sepsis with broad-spectrum antibiotics. Her worsening mental status is suggested to be due to her metabolic derangement. Review of her telemetry reveals sinus rhythm with frequent atrial premature contractions. Review of her electrocardiography examination overnight does suggest short periods of atrial fibrillation possibly in part secondary to acute metabolic derangements. In any event conservative therapy is warranted and she is not likely to be a candidate for prolonged anticoagulation despite her elevated CHADS score.    - Continues on telemetry overnight to remain in SR 60's-70's with PACs. No AF.   - Continue full dose enoxaparin at present. Not ideal AC candidate long term. I would consider dc lovenox and start ASA.   - Cont Metoprolol 25mg q8H    - Antibiotics per medicine  - Echocardiography with normal LV systolic function. MAC with moderate mitral and tricuspid insufficiency,  Mild LAE and GHAZALA.  Left pleural effusion.   - Monitor electrolytes, replete as needed.  Maintain K >4 Mag >2  - Further management can be dependent on her clinical course   - All other needs as per primary team.

## 2018-08-14 NOTE — PROGRESS NOTE ADULT - ASSESSMENT
89F with PMH of advanced dementia, HTN, HLD, TIA, GERD, Barretts esophagus, CAD s/p (2 stents; 2002, 2012) admitted for sepsis 2/2 UTI, gram + bacteremia,  AMS likely encephalopathy vs hospital acquired delirium, SARA, and now new onset Afib with RVR 89F with PMH of advanced dementia, HTN, HLD, TIA, GERD, Barretts esophagus, CAD s/p (2 stents; 2002, 2012) admitted for sepsis 2/2 UTI, gram + bacteremia,  AMS likely  hospital acquired delirium, SARA on CKD2 ( likely) , and now new onset Afib with RVR

## 2018-08-14 NOTE — DISCHARGE NOTE ADULT - CARE PLAN
Principal Discharge DX:	UTI (urinary tract infection)  Goal:	Resolution  Assessment and plan of treatment:	Complete course of antibiotics as prescribed  f/u with pcp  Secondary Diagnosis:	Sepsis  Assessment and plan of treatment:	resolved  secondary to uti  complete course of antibiotics as prescribed  Secondary Diagnosis:	SARA (acute kidney injury)  Assessment and plan of treatment:	resolved   f/u with pcp  Secondary Diagnosis:	Atrial fibrillation with RVR  Assessment and plan of treatment:	continue meds as prescribed.  f/u with cardio  Secondary Diagnosis:	Delirium  Assessment and plan of treatment:	resolved  f/u with your doctor  Secondary Diagnosis:	Dementia  Assessment and plan of treatment:	continue supportive care  f/u with your doctor  fall precautions  Secondary Diagnosis:	GERD (gastroesophageal reflux disease)  Assessment and plan of treatment:	continue meds  f/u with GI Principal Discharge DX:	UTI (urinary tract infection)  Goal:	Resolution  Assessment and plan of treatment:	Complete course of antibiotics as prescribed  f/u with pcp  Secondary Diagnosis:	Sepsis  Assessment and plan of treatment:	resolved  secondary to uti  complete course of antibiotics as prescribed  Secondary Diagnosis:	SARA (acute kidney injury)  Assessment and plan of treatment:	resolved   f/u with pcp  Secondary Diagnosis:	Atrial fibrillation with RVR  Assessment and plan of treatment:	continue meds as prescribed. Anticoagulation not recommended by cardiology secondary to dementia, and risk of falls and bleeding but ok to start aspirin 325mg po daily  f/u with cardio  Secondary Diagnosis:	Delirium  Assessment and plan of treatment:	resolved  f/u with your doctor  Secondary Diagnosis:	Dementia  Assessment and plan of treatment:	continue supportive care  f/u with your doctor  fall precautions  Secondary Diagnosis:	GERD (gastroesophageal reflux disease)  Assessment and plan of treatment:	continue meds  f/u with GI Principal Discharge DX:	UTI (urinary tract infection)  Goal:	Resolution  Assessment and plan of treatment:	Complete course of antibiotics as prescribed  f/u with pcp  Secondary Diagnosis:	Sepsis  Assessment and plan of treatment:	resolved  secondary to uti  complete course of antibiotics as prescribed  Secondary Diagnosis:	SARA (acute kidney injury)  Assessment and plan of treatment:	resolved   f/u with pcp  Secondary Diagnosis:	Atrial fibrillation with RVR  Assessment and plan of treatment:	continue meds as prescribed. Anticoagulation not recommended by cardiology secondary to dementia, and risk of falls and bleeding but ok to start aspirin 325mg po daily  f/u with cardio  Secondary Diagnosis:	Delirium  Assessment and plan of treatment:	Likely secondary to hospital acquired delirium ans suspect metabolic encephalopathy secondary to pyelonephritis.   resolved  f/u with your doctor  Secondary Diagnosis:	Dementia  Assessment and plan of treatment:	continue supportive care  f/u with your doctor  fall precautions  Secondary Diagnosis:	GERD (gastroesophageal reflux disease)  Assessment and plan of treatment:	continue meds  f/u with GI

## 2018-08-14 NOTE — DISCHARGE NOTE ADULT - PLAN OF CARE
continue meds as prescribed.  f/u with cardio resolved  f/u with your doctor continue supportive care  f/u with your doctor  fall precautions continue meds  f/u with GI Resolution Complete course of antibiotics as prescribed  f/u with pcp resolved  secondary to uti  complete course of antibiotics as prescribed resolved   f/u with pcp continue meds as prescribed. Anticoagulation not recommended by cardiology secondary to dementia, and risk of falls and bleeding but ok to start aspirin 325mg po daily  f/u with cardio Likely secondary to hospital acquired delirium ans suspect metabolic encephalopathy secondary to pyelonephritis.   resolved  f/u with your doctor

## 2018-08-14 NOTE — PROGRESS NOTE ADULT - PROBLEM SELECTOR PLAN 1
Continue Metoprolol.  Continue Lovenox for now as AC but cardio patient not a good candidate for long term AC. Will f/u   TTE  Cardio Following  Tele monitor

## 2018-08-15 VITALS
SYSTOLIC BLOOD PRESSURE: 104 MMHG | DIASTOLIC BLOOD PRESSURE: 70 MMHG | HEART RATE: 75 BPM | TEMPERATURE: 98 F | OXYGEN SATURATION: 98 % | RESPIRATION RATE: 18 BRPM

## 2018-08-15 LAB
ANION GAP SERPL CALC-SCNC: 9 MMOL/L — SIGNIFICANT CHANGE UP (ref 5–17)
BUN SERPL-MCNC: 18 MG/DL — SIGNIFICANT CHANGE UP (ref 7–23)
CALCIUM SERPL-MCNC: 7.6 MG/DL — LOW (ref 8.5–10.1)
CHLORIDE SERPL-SCNC: 103 MMOL/L — SIGNIFICANT CHANGE UP (ref 96–108)
CO2 SERPL-SCNC: 24 MMOL/L — SIGNIFICANT CHANGE UP (ref 22–31)
CREAT SERPL-MCNC: 1 MG/DL — SIGNIFICANT CHANGE UP (ref 0.5–1.3)
CULTURE RESULTS: SIGNIFICANT CHANGE UP
GLUCOSE SERPL-MCNC: 95 MG/DL — SIGNIFICANT CHANGE UP (ref 70–99)
HCT VFR BLD CALC: 29.8 % — LOW (ref 34.5–45)
HGB BLD-MCNC: 9.6 G/DL — LOW (ref 11.5–15.5)
MCHC RBC-ENTMCNC: 26.7 PG — LOW (ref 27–34)
MCHC RBC-ENTMCNC: 32.2 GM/DL — SIGNIFICANT CHANGE UP (ref 32–36)
MCV RBC AUTO: 83 FL — SIGNIFICANT CHANGE UP (ref 80–100)
NRBC # BLD: 0 /100 WBCS — SIGNIFICANT CHANGE UP (ref 0–0)
PLATELET # BLD AUTO: 154 K/UL — SIGNIFICANT CHANGE UP (ref 150–400)
POTASSIUM SERPL-MCNC: 3.3 MMOL/L — LOW (ref 3.5–5.3)
POTASSIUM SERPL-SCNC: 3.3 MMOL/L — LOW (ref 3.5–5.3)
RBC # BLD: 3.59 M/UL — LOW (ref 3.8–5.2)
RBC # FLD: 16.7 % — HIGH (ref 10.3–14.5)
SODIUM SERPL-SCNC: 136 MMOL/L — SIGNIFICANT CHANGE UP (ref 135–145)
SPECIMEN SOURCE: SIGNIFICANT CHANGE UP
WBC # BLD: 6.18 K/UL — SIGNIFICANT CHANGE UP (ref 3.8–10.5)
WBC # FLD AUTO: 6.18 K/UL — SIGNIFICANT CHANGE UP (ref 3.8–10.5)

## 2018-08-15 PROCEDURE — 99232 SBSQ HOSP IP/OBS MODERATE 35: CPT

## 2018-08-15 PROCEDURE — 99239 HOSP IP/OBS DSCHRG MGMT >30: CPT

## 2018-08-15 RX ORDER — ASPIRIN/CALCIUM CARB/MAGNESIUM 324 MG
325 TABLET ORAL DAILY
Qty: 0 | Refills: 0 | Status: DISCONTINUED | OUTPATIENT
Start: 2018-08-15 | End: 2018-08-15

## 2018-08-15 RX ORDER — CEPHALEXIN 500 MG
1 CAPSULE ORAL
Qty: 0 | Refills: 0 | COMMUNITY
Start: 2018-08-15

## 2018-08-15 RX ORDER — POTASSIUM CHLORIDE 20 MEQ
40 PACKET (EA) ORAL ONCE
Qty: 0 | Refills: 0 | Status: COMPLETED | OUTPATIENT
Start: 2018-08-15 | End: 2018-08-15

## 2018-08-15 RX ORDER — METOPROLOL TARTRATE 50 MG
1 TABLET ORAL
Qty: 0 | Refills: 0 | COMMUNITY
Start: 2018-08-15

## 2018-08-15 RX ORDER — ASPIRIN/CALCIUM CARB/MAGNESIUM 324 MG
1 TABLET ORAL
Qty: 0 | Refills: 0 | COMMUNITY
Start: 2018-08-15

## 2018-08-15 RX ADMIN — Medication 325 MILLIGRAM(S): at 11:20

## 2018-08-15 RX ADMIN — Medication 250 MILLIGRAM(S): at 00:21

## 2018-08-15 RX ADMIN — Medication 40 MILLIEQUIVALENT(S): at 11:20

## 2018-08-15 RX ADMIN — Medication 250 MILLIGRAM(S): at 17:19

## 2018-08-15 RX ADMIN — Medication 250 MILLIGRAM(S): at 11:20

## 2018-08-15 RX ADMIN — Medication 25 MILLIGRAM(S): at 06:26

## 2018-08-15 RX ADMIN — ENOXAPARIN SODIUM 70 MILLIGRAM(S): 100 INJECTION SUBCUTANEOUS at 06:25

## 2018-08-15 RX ADMIN — Medication 250 MILLIGRAM(S): at 06:25

## 2018-08-15 RX ADMIN — Medication 25 MILLIGRAM(S): at 13:51

## 2018-08-15 NOTE — PROGRESS NOTE ADULT - SUBJECTIVE AND OBJECTIVE BOX
Patient is a 89y old  Female who presents with a chief complaint of sepsis 2/2 UTI (14 Aug 2018 11:37)       INTERVAL HPI/OVERNIGHT EVENTS: Patient seen and examined at bedside. Awake, alert, confused at baseline. Looks much calmer. Denies any symptoms    MEDICATIONS  (STANDING):  aspirin 325 milliGRAM(s) Oral daily  cephalexin 250 milliGRAM(s) Oral four times a day  levothyroxine Injectable 37.5 MICROGram(s) IV Push at bedtime  metoprolol tartrate 25 milliGRAM(s) Oral every 8 hours    MEDICATIONS  (PRN):  acetaminophen  Suppository 650 milliGRAM(s) Rectal every 6 hours PRN For Temp greater than 38 C (100.4 F)  OLANZapine Injectable 2.5 milliGRAM(s) IntraMuscular every 6 hours PRN Acute agitation      Allergies    No Known Allergies    Intolerances        REVIEW OF SYSTEMS:  All 10 systems reviewed and are negative, patient says no to all the questions.   Vital Signs Last 24 Hrs  T(C): 36.4 (15 Aug 2018 08:00), Max: 36.9 (15 Aug 2018 00:27)  T(F): 97.6 (15 Aug 2018 08:00), Max: 98.5 (15 Aug 2018 00:27)  HR: 72 (15 Aug 2018 08:00) (72 - 97)  BP: 153/71 (15 Aug 2018 08:00) (147/84 - 188/84)  BP(mean): --  RR: 18 (15 Aug 2018 08:00) (18 - 18)  SpO2: 96% (15 Aug 2018 08:00) (94% - 96%)    PHYSICAL EXAM:  GENERAL: NAD, Awake, Alert  HEAD:  Atraumatic, Normocephalic  EYES: EOMI, PERRLA, conjunctiva and sclera clear  ENMT: No tonsillar erythema, exudates, or enlargement; Moist mucous membrane  NECK: Supple, No JVD, Normal thyroid  NERVOUS SYSTEM:  AAOx1, grossly non focal neuro exam   CHEST/LUNG: Decrease   to auscultation bilaterally; No rales, rhonchi, wheezing, or rubs  HEART: S1S2+, Regular rate and rhythm  ABDOMEN: Soft, Nontender, Nondistended; Bowel sounds present  EXTREMITIES:  2+ Peripheral Pulses, No clubbing, cyanosis, or edema  LYMPH: No lymphadenopathy noted    LABS:                        9.6    6.18  )-----------( 154      ( 15 Aug 2018 06:49 )             29.8     15 Aug 2018 06:49    136    |  103    |  18     ----------------------------<  95     3.3     |  24     |  1.00     Ca    7.6        15 Aug 2018 06:49        CAPILLARY BLOOD GLUCOSE        BLOOD CULTURE  08-11 @ 19:46   No growth to date.  --  --    RADIOLOGY & ADDITIONAL TESTS:    Imaging Personally Reviewed:  [ ] YES     Consultant(s) Notes Reviewed:      Care Discussed with Consultants/Other Providers:

## 2018-08-15 NOTE — PROGRESS NOTE ADULT - SUBJECTIVE AND OBJECTIVE BOX
Neurology follow up note    JOHN [id number]      Interval History:    Patient feels ok no new complaints.    MEDICATIONS    acetaminophen  Suppository 650 milliGRAM(s) Rectal every 6 hours PRN  aspirin 325 milliGRAM(s) Oral daily  cephalexin 250 milliGRAM(s) Oral four times a day  levothyroxine Injectable 37.5 MICROGram(s) IV Push at bedtime  metoprolol tartrate 25 milliGRAM(s) Oral every 8 hours  OLANZapine Injectable 2.5 milliGRAM(s) IntraMuscular every 6 hours PRN  potassium chloride    Tablet ER 40 milliEquivalent(s) Oral once      Allergies    No Known Allergies    Intolerances            Vital Signs Last 24 Hrs  T(C): 36.4 (15 Aug 2018 08:00), Max: 36.9 (15 Aug 2018 00:27)  T(F): 97.6 (15 Aug 2018 08:00), Max: 98.5 (15 Aug 2018 00:27)  HR: 72 (15 Aug 2018 08:00) (72 - 97)  BP: 153/71 (15 Aug 2018 08:00) (147/84 - 188/84)  BP(mean): --  RR: 18 (15 Aug 2018 08:00) (18 - 18)  SpO2: 96% (15 Aug 2018 08:00) (94% - 96%)      REVIEW OF SYSTEMS:  Slightly limited.   Constitutional:  Positive history of fevers and chills.    Head:  No headaches.    Eyes:  No double vision or blurry vision.    Ears:  No ringing in the ears.    Neck:  No neck pain.    Respiratory:  No shortness of breath.    Cardiovascular:  No chest pain.    Abdomen:  No nausea, vomiting, or abdominal pain.    Extremities/Neurological:  No numbness or tingling.    Musculoskeletal:  Positive history of joint pain.    General:  Positive history of frequent falls and positive episodes of poor oral intak    NEUROLOGIC:  The patient is arousable to verbal stimuli.  Location was hospital, year was 2017, month was unknown, and was able to name objects such as a pen.      Extraocular movements were intact.  Pupils were equal, round, and reactive bilaterally, 3 mm to 2.      Speech was fluent.  Smile was symmetric.     Motor:  Bilateral upper and lower were 4-/5, would say age appropriate.     The patient could follow simple commands but had difficulty following complex commands.     GENERAL Exam: Nontoxic , No Acute Distress   	  HEENT:  normocephalic, atraumatic  		  LUNGS:  Decreased bilaterally  	  HEART: Normal S1S2   No murmur RRR        	  GI/ ABDOMEN:  Soft  Non tender    EXTREMITIES:   No Edema  No Clubbing  No Cyanosis     MUSCULOSKELETAL: Normal Range of Motion  	   SKIN: Normal  No Ecchymosis               LABS:  CBC Full  -  ( 15 Aug 2018 06:49 )  WBC Count : 6.18 K/uL  Hemoglobin : 9.6 g/dL  Hematocrit : 29.8 %  Platelet Count - Automated : 154 K/uL  Mean Cell Volume : 83.0 fl  Mean Cell Hemoglobin : 26.7 pg  Mean Cell Hemoglobin Concentration : 32.2 gm/dL  Auto Neutrophil # : x  Auto Lymphocyte # : x  Auto Monocyte # : x  Auto Eosinophil # : x  Auto Basophil # : x  Auto Neutrophil % : x  Auto Lymphocyte % : x  Auto Monocyte % : x  Auto Eosinophil % : x  Auto Basophil % : x      08-15    136  |  103  |  18  ----------------------------<  95  3.3<L>   |  24  |  1.00    Ca    7.6<L>      15 Aug 2018 06:49  Mg     2.0     08-14      Hemoglobin A1C:       Vitamin B12         RADIOLOGY      ANALYSIS AND PLAN:  This is a 89-year-old with an episode of change in mental status, history of frequent falls, history of TIA, and subdural hematomas.  1.	For change in mental status, possibly this could be secondary to underlying viral type syndrome versus underlying infectious type process.  2.	I would recommend Infectious Disease evaluation.  3.	Antibiotics as needed.  4.	For history of dementia, secondary to the patient's age, I would recommend supportive therapy.  5.	For history of questionable TIAs, I would recommend supportive therapy.  6.	For frequent falls, most likely secondary to age related changes, mechanical falls, and dementia.  7.	For history of subdural hematoma, it appeared to be secondary to an episode of fall with head trauma.  8.	S/S evaluation   9.	Monitor oral intake.  10.	Fall precautions.  11.	Physical Therapy.  12.	Spoke with the daughter, Mary, in great detail, she does understand that while in the hospital setting, the patient's mental status may deteriorate, especially towards the evening.  13.	Daughter's name is Mary, telephone number is [phone]. 8/15/18  14.	rehab   15.	no new events      Thank you for the courtesy of this consultation.    Greater than 35 minutes spent in direct patient care reviewing  the notes, lab data/ imaging , discussion with multidisciplinary team.

## 2018-08-15 NOTE — PROGRESS NOTE ADULT - ASSESSMENT
Ms. Maria is being treated for possible sepsis with broad-spectrum antibiotics. Her worsening mental status is suggested to be due to her metabolic derangement. Review of her telemetry reveals sinus rhythm with frequent atrial premature contractions. Review of her electrocardiography examination overnight does suggest short periods of atrial fibrillation possibly in part secondary to acute metabolic derangements. In any event conservative therapy is warranted and she is not likely to be a candidate for prolonged anticoagulation despite her elevated CHADS score.    - clinically sr  -  Not ideal AC candidate long term. Switched to asa 325  - Cont Metoprolol     - Antibiotics per medicine  - Echocardiography with normal LV systolic function. MAC with moderate mitral and tricuspid insufficiency,  Mild LAE and GHAZALA.  Left pleural effusion.   - Monitor electrolytes, replete as needed.  Maintain K >4 Mag >2  - Further management can be dependent on her clinical course   - All other needs as per primary team.

## 2018-08-15 NOTE — PROGRESS NOTE ADULT - SUBJECTIVE AND OBJECTIVE BOX
[facility]    Estella Johnson, Mandie, Aiyana, Aris, Guilherme, Cortney      [phone]    CHIEF COMPLAINT: Patient is a 89y old  Female who presents with a chief complaint of sepsis 2/2 UTI (14 Aug 2018 11:37)      Follow Up: dementia, af    Interim history: events noted, unable to provide hx    MEDICATIONS  (STANDING):  aspirin 325 milliGRAM(s) Oral daily  cephalexin 250 milliGRAM(s) Oral four times a day  levothyroxine Injectable 37.5 MICROGram(s) IV Push at bedtime  metoprolol tartrate 25 milliGRAM(s) Oral every 8 hours    MEDICATIONS  (PRN):  acetaminophen  Suppository 650 milliGRAM(s) Rectal every 6 hours PRN For Temp greater than 38 C (100.4 F)  OLANZapine Injectable 2.5 milliGRAM(s) IntraMuscular every 6 hours PRN Acute agitation      REVIEW OF SYSTEMS: unable    Vital Signs Last 24 Hrs  T(C): 36.4 (15 Aug 2018 12:25), Max: 36.9 (15 Aug 2018 00:27)  T(F): 97.6 (15 Aug 2018 12:25), Max: 98.5 (15 Aug 2018 00:27)  HR: 71 (15 Aug 2018 12:25) (71 - 97)  BP: 119/81 (15 Aug 2018 12:25) (119/81 - 188/84)  BP(mean): --  RR: 18 (15 Aug 2018 12:25) (18 - 18)  SpO2: 93% (15 Aug 2018 12:25) (93% - 96%)    I&O's Summary      Telemetry past 24h:    PHYSICAL EXAM:    Constitutional: well-nourished, well-developed, NAD   HEENT:  MMM, sclerae anicteric, conjunctivae clear, no oral cyanosis.  Pulmonary: Non-labored, breath sounds are clear bilaterally, No wheezing, rales or rhonchi  Cardiovascular: irregular, S1 and S2.  No murmur.  No rubs, gallops or clicks  Gastrointestinal: Bowel Sounds present, soft, nontender.   Lymph: No peripheral edema.   Neurological: confused  Skin: No rashes.  Psych:  confused    LABS: All Labs Reviewed:                        9.6    6.18  )-----------( 154      ( 15 Aug 2018 06:49 )             29.8                         10.3   8.97  )-----------( 180      ( 14 Aug 2018 08:27 )             32.6                         10.6   8.06  )-----------( 183      ( 13 Aug 2018 06:23 )             33.6     15 Aug 2018 06:49    136    |  103    |  18     ----------------------------<  95     3.3     |  24     |  1.00   14 Aug 2018 08:27    138    |  106    |  18     ----------------------------<  117    4.0     |  24     |  1.20   13 Aug 2018 06:23    144    |  109    |  18     ----------------------------<  130    3.2     |  23     |  1.10     Ca    7.6        15 Aug 2018 06:49  Ca    8.2        14 Aug 2018 08:27  Ca    8.0        13 Aug 2018 06:23  Mg     2.0       14 Aug 2018 08:27            Blood Culture: Organism --  Gram Stain Blood -- Gram Stain --  Specimen Source .Blood Blood-Peripheral  Culture-Blood --    Organism Blood Culture PCR  Gram Stain Blood -- Gram Stain   Growth in aerobic bottle: Gram positive cocci in pairs  Growth in anaerobic bottle: Gram positive cocci in pairs  Specimen Source .Blood Blood-Peripheral  Culture-Blood --    Organism Klebsiella pneumoniae  Gram Stain Blood -- Gram Stain --  Specimen Source .Urine Catheterized  Culture-Blood --            RADIOLOGY:    EKG:    Echo:

## 2018-08-15 NOTE — PROGRESS NOTE ADULT - PROBLEM SELECTOR PLAN 1
Continue Metoprolol.  D/w Cardio  patient not a good candidate for long term AC  Start aspirin 325mg po daily  D/C Lovenox   TTE  Cardio Following  Tele monitor

## 2018-08-15 NOTE — PROGRESS NOTE ADULT - PROBLEM SELECTOR PLAN 2
- Suspect Pyelonephritis. Resolved   - On Keflex now  -Tylenol PRN for fever  -RVP negative  -Blood cultures repeat negative.

## 2018-08-15 NOTE — PROGRESS NOTE ADULT - PROVIDER SPECIALTY LIST ADULT
Cardiology
Hospitalist
Infectious Disease
Infectious Disease
Neurology
Cardiology
Neurology
Cardiology
Cardiology
Infectious Disease
Hospitalist

## 2018-08-15 NOTE — PROGRESS NOTE ADULT - ASSESSMENT
89F with PMH of advanced dementia, HTN, HLD, TIA, GERD, Barretts esophagus, CAD s/p (2 stents; 2002, 2012) admitted for sepsis 2/2 UTI, gram + bacteremia,  AMS likely  hospital acquired delirium, SARA on CKD2 ( likely) , and new onset Afib with RVR

## 2018-08-15 NOTE — PROGRESS NOTE ADULT - PROBLEM SELECTOR PLAN 4
-  likely hospital acquired delirium in setting of  advanced dementia . Doubt encephalopathy   -Patient seems to be back to baseline mental status  -Monitor closely off of 1:1 observation  -Zyprexa PRn per Psych  -Fall precautions

## 2018-08-16 LAB
CULTURE RESULTS: SIGNIFICANT CHANGE UP
CULTURE RESULTS: SIGNIFICANT CHANGE UP
SPECIMEN SOURCE: SIGNIFICANT CHANGE UP
SPECIMEN SOURCE: SIGNIFICANT CHANGE UP

## 2018-08-16 RX ORDER — CHOLECALCIFEROL (VITAMIN D3) 125 MCG
0 CAPSULE ORAL
Qty: 0 | Refills: 0 | COMMUNITY

## 2018-08-16 RX ORDER — ASPIRIN/CALCIUM CARB/MAGNESIUM 324 MG
1 TABLET ORAL
Qty: 0 | Refills: 0 | COMMUNITY

## 2018-08-16 RX ORDER — LEVOTHYROXINE SODIUM 125 MCG
0 TABLET ORAL
Qty: 0 | Refills: 0 | COMMUNITY

## 2018-08-16 RX ORDER — ASPIRIN/CALCIUM CARB/MAGNESIUM 324 MG
0 TABLET ORAL
Qty: 0 | Refills: 0 | COMMUNITY

## 2018-08-16 RX ORDER — OMEPRAZOLE 10 MG/1
0 CAPSULE, DELAYED RELEASE ORAL
Qty: 0 | Refills: 0 | COMMUNITY

## 2018-08-20 ENCOUNTER — INPATIENT (INPATIENT)
Facility: HOSPITAL | Age: 83
LOS: 6 days | Discharge: NURSING, REHAB OR LONG-TERM CARE | DRG: 378 | End: 2018-08-27
Attending: INTERNAL MEDICINE | Admitting: INTERNAL MEDICINE
Payer: MEDICARE

## 2018-08-20 VITALS
DIASTOLIC BLOOD PRESSURE: 40 MMHG | RESPIRATION RATE: 18 BRPM | OXYGEN SATURATION: 90 % | SYSTOLIC BLOOD PRESSURE: 158 MMHG | HEART RATE: 97 BPM | TEMPERATURE: 96 F

## 2018-08-20 DIAGNOSIS — F03.90 UNSPECIFIED DEMENTIA WITHOUT BEHAVIORAL DISTURBANCE: ICD-10-CM

## 2018-08-20 DIAGNOSIS — E78.5 HYPERLIPIDEMIA, UNSPECIFIED: ICD-10-CM

## 2018-08-20 DIAGNOSIS — D50.0 IRON DEFICIENCY ANEMIA SECONDARY TO BLOOD LOSS (CHRONIC): ICD-10-CM

## 2018-08-20 DIAGNOSIS — K92.2 GASTROINTESTINAL HEMORRHAGE, UNSPECIFIED: ICD-10-CM

## 2018-08-20 DIAGNOSIS — I10 ESSENTIAL (PRIMARY) HYPERTENSION: ICD-10-CM

## 2018-08-20 DIAGNOSIS — T68.XXXA HYPOTHERMIA, INITIAL ENCOUNTER: ICD-10-CM

## 2018-08-20 DIAGNOSIS — K21.9 GASTRO-ESOPHAGEAL REFLUX DISEASE WITHOUT ESOPHAGITIS: ICD-10-CM

## 2018-08-20 DIAGNOSIS — K22.70 BARRETT'S ESOPHAGUS WITHOUT DYSPLASIA: ICD-10-CM

## 2018-08-20 DIAGNOSIS — E03.9 HYPOTHYROIDISM, UNSPECIFIED: ICD-10-CM

## 2018-08-20 DIAGNOSIS — D62 ACUTE POSTHEMORRHAGIC ANEMIA: ICD-10-CM

## 2018-08-20 DIAGNOSIS — E78.00 PURE HYPERCHOLESTEROLEMIA, UNSPECIFIED: ICD-10-CM

## 2018-08-20 DIAGNOSIS — G45.9 TRANSIENT CEREBRAL ISCHEMIC ATTACK, UNSPECIFIED: ICD-10-CM

## 2018-08-20 DIAGNOSIS — Z90.49 ACQUIRED ABSENCE OF OTHER SPECIFIED PARTS OF DIGESTIVE TRACT: Chronic | ICD-10-CM

## 2018-08-20 DIAGNOSIS — I25.10 ATHEROSCLEROTIC HEART DISEASE OF NATIVE CORONARY ARTERY WITHOUT ANGINA PECTORIS: ICD-10-CM
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
## 2018-08-20 DIAGNOSIS — I25.119 ATHEROSCLEROTIC HEART DISEASE OF NATIVE CORONARY ARTERY WITH UNSPECIFIED ANGINA PECTORIS: ICD-10-CM

## 2018-08-20 DIAGNOSIS — H26.9 UNSPECIFIED CATARACT: Chronic | ICD-10-CM

## 2018-08-20 DIAGNOSIS — Z86.73 PERSONAL HISTORY OF TRANSIENT ISCHEMIC ATTACK (TIA), AND CEREBRAL INFARCTION WITHOUT RESIDUAL DEFICITS: ICD-10-CM

## 2018-08-20 PROBLEM — M19.90 UNSPECIFIED OSTEOARTHRITIS, UNSPECIFIED SITE: Chronic | Status: ACTIVE | Noted: 2018-08-10

## 2018-08-20 LAB
ABO RH CONFIRMATION: SIGNIFICANT CHANGE UP
ALBUMIN SERPL ELPH-MCNC: 2 G/DL — LOW (ref 3.3–5)
ALP SERPL-CCNC: 58 U/L — SIGNIFICANT CHANGE UP (ref 40–120)
ALT FLD-CCNC: 18 U/L — SIGNIFICANT CHANGE UP (ref 12–78)
ANION GAP SERPL CALC-SCNC: 13 MMOL/L — SIGNIFICANT CHANGE UP (ref 5–17)
APPEARANCE UR: ABNORMAL
APTT BLD: 34.4 SEC — SIGNIFICANT CHANGE UP (ref 27.5–37.4)
AST SERPL-CCNC: 15 U/L — SIGNIFICANT CHANGE UP (ref 15–37)
BASOPHILS # BLD AUTO: 0.01 K/UL — SIGNIFICANT CHANGE UP (ref 0–0.2)
BASOPHILS NFR BLD AUTO: 0.1 % — SIGNIFICANT CHANGE UP (ref 0–2)
BILIRUB SERPL-MCNC: 0.2 MG/DL — SIGNIFICANT CHANGE UP (ref 0.2–1.2)
BILIRUB UR-MCNC: NEGATIVE — SIGNIFICANT CHANGE UP
BUN SERPL-MCNC: 56 MG/DL — HIGH (ref 7–23)
CALCIUM SERPL-MCNC: 8 MG/DL — LOW (ref 8.5–10.1)
CHLORIDE SERPL-SCNC: 109 MMOL/L — HIGH (ref 96–108)
CO2 SERPL-SCNC: 20 MMOL/L — LOW (ref 22–31)
COLOR SPEC: YELLOW — SIGNIFICANT CHANGE UP
CREAT SERPL-MCNC: 1.3 MG/DL — SIGNIFICANT CHANGE UP (ref 0.5–1.3)
DIFF PNL FLD: ABNORMAL
EOSINOPHIL # BLD AUTO: 0 K/UL — SIGNIFICANT CHANGE UP (ref 0–0.5)
EOSINOPHIL NFR BLD AUTO: 0 % — SIGNIFICANT CHANGE UP (ref 0–6)
GLUCOSE SERPL-MCNC: 99 MG/DL — SIGNIFICANT CHANGE UP (ref 70–99)
GLUCOSE UR QL: NEGATIVE — SIGNIFICANT CHANGE UP
HCT VFR BLD CALC: 21.6 % — LOW (ref 34.5–45)
HGB BLD-MCNC: 6.8 G/DL — CRITICAL LOW (ref 11.5–15.5)
IMM GRANULOCYTES NFR BLD AUTO: 0.3 % — SIGNIFICANT CHANGE UP (ref 0–1.5)
INR BLD: 1.14 RATIO — SIGNIFICANT CHANGE UP (ref 0.88–1.16)
KETONES UR-MCNC: NEGATIVE — SIGNIFICANT CHANGE UP
LACTATE SERPL-SCNC: 2.5 MMOL/L — HIGH (ref 0.7–2)
LEUKOCYTE ESTERASE UR-ACNC: ABNORMAL
LIDOCAIN IGE QN: 86 U/L — SIGNIFICANT CHANGE UP (ref 73–393)
LYMPHOCYTES # BLD AUTO: 1.32 K/UL — SIGNIFICANT CHANGE UP (ref 1–3.3)
LYMPHOCYTES # BLD AUTO: 13.1 % — SIGNIFICANT CHANGE UP (ref 13–44)
MCHC RBC-ENTMCNC: 26.4 PG — LOW (ref 27–34)
MCHC RBC-ENTMCNC: 31.5 GM/DL — LOW (ref 32–36)
MCV RBC AUTO: 83.7 FL — SIGNIFICANT CHANGE UP (ref 80–100)
MONOCYTES # BLD AUTO: 0.39 K/UL — SIGNIFICANT CHANGE UP (ref 0–0.9)
MONOCYTES NFR BLD AUTO: 3.9 % — SIGNIFICANT CHANGE UP (ref 2–14)
NEUTROPHILS # BLD AUTO: 8.32 K/UL — HIGH (ref 1.8–7.4)
NEUTROPHILS NFR BLD AUTO: 82.6 % — HIGH (ref 43–77)
NITRITE UR-MCNC: NEGATIVE — SIGNIFICANT CHANGE UP
OB PNL STL: POSITIVE
PH UR: 5 — SIGNIFICANT CHANGE UP (ref 5–8)
PLATELET # BLD AUTO: 306 K/UL — SIGNIFICANT CHANGE UP (ref 150–400)
POTASSIUM SERPL-MCNC: 4.5 MMOL/L — SIGNIFICANT CHANGE UP (ref 3.5–5.3)
POTASSIUM SERPL-SCNC: 4.5 MMOL/L — SIGNIFICANT CHANGE UP (ref 3.5–5.3)
PROT SERPL-MCNC: 5 G/DL — LOW (ref 6–8.3)
PROT UR-MCNC: 25 MG/DL
PROTHROM AB SERPL-ACNC: 12.5 SEC — SIGNIFICANT CHANGE UP (ref 9.8–12.7)
RBC # BLD: 2.58 M/UL — LOW (ref 3.8–5.2)
RBC # FLD: 18.6 % — HIGH (ref 10.3–14.5)
SODIUM SERPL-SCNC: 142 MMOL/L — SIGNIFICANT CHANGE UP (ref 135–145)
SP GR SPEC: 1 — LOW (ref 1.01–1.02)
UROBILINOGEN FLD QL: NEGATIVE — SIGNIFICANT CHANGE UP
WBC # BLD: 10.07 K/UL — SIGNIFICANT CHANGE UP (ref 3.8–10.5)
WBC # FLD AUTO: 10.07 K/UL — SIGNIFICANT CHANGE UP (ref 3.8–10.5)

## 2018-08-20 PROCEDURE — 99285 EMERGENCY DEPT VISIT HI MDM: CPT

## 2018-08-20 PROCEDURE — 70450 CT HEAD/BRAIN W/O DYE: CPT | Mod: 26

## 2018-08-20 PROCEDURE — 93010 ELECTROCARDIOGRAM REPORT: CPT

## 2018-08-20 PROCEDURE — 71045 X-RAY EXAM CHEST 1 VIEW: CPT | Mod: 26

## 2018-08-20 RX ORDER — PANTOPRAZOLE SODIUM 20 MG/1
8 TABLET, DELAYED RELEASE ORAL
Qty: 80 | Refills: 0 | Status: DISCONTINUED | OUTPATIENT
Start: 2018-08-20 | End: 2018-08-21

## 2018-08-20 RX ORDER — METOPROLOL TARTRATE 50 MG
25 TABLET ORAL DAILY
Qty: 0 | Refills: 0 | Status: DISCONTINUED | OUTPATIENT
Start: 2018-08-20 | End: 2018-08-27

## 2018-08-20 RX ORDER — PANTOPRAZOLE SODIUM 20 MG/1
40 TABLET, DELAYED RELEASE ORAL ONCE
Qty: 0 | Refills: 0 | Status: COMPLETED | OUTPATIENT
Start: 2018-08-20 | End: 2018-08-20

## 2018-08-20 RX ORDER — ACETAMINOPHEN 500 MG
650 TABLET ORAL EVERY 6 HOURS
Qty: 0 | Refills: 0 | Status: DISCONTINUED | OUTPATIENT
Start: 2018-08-20 | End: 2018-08-27

## 2018-08-20 RX ORDER — LEVOTHYROXINE SODIUM 125 MCG
25 TABLET ORAL AT BEDTIME
Qty: 0 | Refills: 0 | Status: DISCONTINUED | OUTPATIENT
Start: 2018-08-20 | End: 2018-08-23

## 2018-08-20 RX ORDER — SODIUM CHLORIDE 9 MG/ML
1000 INJECTION INTRAMUSCULAR; INTRAVENOUS; SUBCUTANEOUS ONCE
Qty: 0 | Refills: 0 | Status: COMPLETED | OUTPATIENT
Start: 2018-08-20 | End: 2018-08-20

## 2018-08-20 RX ADMIN — Medication 0.25 MILLIGRAM(S): at 20:34

## 2018-08-20 RX ADMIN — PANTOPRAZOLE SODIUM 10 MG/HR: 20 TABLET, DELAYED RELEASE ORAL at 23:00

## 2018-08-20 RX ADMIN — SODIUM CHLORIDE 1000 MILLILITER(S): 9 INJECTION INTRAMUSCULAR; INTRAVENOUS; SUBCUTANEOUS at 15:00

## 2018-08-20 RX ADMIN — Medication 25 MICROGRAM(S): at 22:11

## 2018-08-20 RX ADMIN — PANTOPRAZOLE SODIUM 40 MILLIGRAM(S): 20 TABLET, DELAYED RELEASE ORAL at 15:15

## 2018-08-20 NOTE — H&P ADULT - NSHPLABSRESULTS_GEN_ALL_CORE
< from: CT Head No Cont (08.20.18 @ 15:58) >      EXAM:  CT BRAIN                            PROCEDURE DATE:  08/20/2018          INTERPRETATION:  CLINICAL STATEMENT: Confusion    TECHNIQUE: CT of the head was performed without IV contrast.    COMPARISON: 8/10/2018    FINDINGS:    There is diffuseparenchymal volume loss. There are areas of low   attenuation in the periventricular white matter likely related to chronic   microvascular ischemic changes.    There is no acute parenchymal hemorrhage, parenchymal mass, mass effect   or midline shift. There is no extra-axial fluid collection. There is no   acute territorial infarct.     The ventricles are mildly prominent likely secondary to central atrophy.   There is intracranial atherosclerosis.    The calvarium is intact. The visualized paranasal sinuses are well   aerated. Poorly pneumatized right mastoid again noted. The visualized   orbits are unremarkable. The visualized right zygoma demonstrates again   evidence of old fracture. Hardware is partially identified within the   anteriorright maxillary sinus.    IMPRESSION:    No acute intracranial hemorrhage or acute territorial infarct.   No change.    < end of copied text >

## 2018-08-20 NOTE — ED PROVIDER NOTE - PHYSICAL EXAMINATION
Gen: AOx1 , NAD  Head/eyes: NC/AT, PERRL, EOMI, normal lids/conjunctiva, no scleral icterus  ENT: Bilateral TM WNL, normal hearing, patent oropharynx without erythema/exudate, uvula midline, no peritonsillar abscess, no tongue/uvula swelling  Neck: supple, no tenderness/meningismus/JVD, Trachea midline  Pulm: Bilateral clear BS, normal resp effort, no wheeze/stridor/retractions  CV: RRR, no M/R/G, +2 dist pulses (radial, pedal DP/PT, popliteal)  Abd: soft, NT/ND, +BS, no guarding/rebound tenderness  Musculoskeletal: no edema/erythema/cyanosis, FROM in all extremities, no C/T/L spine ttp  Skin: scabs/abrasion noted on face, no rash, no vesicles, no petechaie, no ecchymosis, no swelling  Neuro: AAOx1, CN 2-12 intact, normal sensation, 5/5 motor strength in all extremities

## 2018-08-20 NOTE — ED PROVIDER NOTE - OBJECTIVE STATEMENT
89 yo female with recent admission for sepsis BIBEMS from [facility] for possible GI bleed, dark stools and for being cool and pale.  +confusion, unsure what pt's baseline, no aide or family at bedside.  Rectal temp 95.  Patient is poor historian. Tana SPARKS is DNR/DNI    pmd rashawn (from [facility])

## 2018-08-20 NOTE — ED PROVIDER NOTE - PROGRESS NOTE DETAILS
discussed case with Dr. Escobedo, will admit received telephone consent for blood transfusion from Mary daughter [phone]

## 2018-08-20 NOTE — ED ADULT NURSE NOTE - CHIEF COMPLAINT QUOTE
pt sent from [facility] for black stools, pt pale, and refusing oxygen and IV for ems, pt alert and confused, cool to touch, recent hospitalization

## 2018-08-20 NOTE — ED ADULT NURSE NOTE - PMH
Arthritis    Arthritis    Damian esophagus    Damian esophagus    CAD (coronary artery disease)    Dementia    GERD (gastroesophageal reflux disease)    GERD (gastroesophageal reflux disease)    High cholesterol    HTN - Hypertension    Hypercholesterolemia    Hypertension    Hypothyroid    Low sodium levels    TIA (transient ischemic attack)    TIA (transient ischemic attack)

## 2018-08-20 NOTE — ED ADULT NURSE NOTE - NSIMPLEMENTINTERV_GEN_ALL_ED
Implemented All Fall with Harm Risk Interventions:  [place] to call system. Call bell, personal items and telephone within reach. Instruct patient to call for assistance. Room bathroom lighting operational. Non-slip footwear when patient is off stretcher. Physically safe environment: no spills, clutter or unnecessary equipment. Stretcher in lowest position, wheels locked, appropriate side rails in place. Provide visual cue, wrist band, yellow gown, etc. Monitor gait and stability. Monitor for mental status changes and reorient to person, place, and time. Review medications for side effects contributing to fall risk. Reinforce activity limits and safety measures with patient and family. Provide visual clues: red socks.

## 2018-08-20 NOTE — H&P ADULT - NSHPSOCIALHISTORY_GEN_ALL_CORE
, lives with dtr , non smoker , progressive dementia , weakness , and poor po intake   patient is dnr and dni

## 2018-08-20 NOTE — H&P ADULT - ASSESSMENT
89F with PMH of advanced dementia, HTN, HLD, TIA, GERD, Barretts esophagus, CAD s/p (2 stents; 2002, 2012) admitted for sepsis 2/2 UTI, gram + bacteremia,  AMS likely  hospital acquired delirium and suspected metabolic encephalopathy secondary to pyelonephritis,  SARA, and now new onset Afib with RVR. Patient was on 1:1 observation for safety. And was seen by Neuro and Psych. Patient was started on IV antibiotics for UTI. Blood cultures were negative, one sample came back positive for alpha hemolytic Strep that was likely contaminant per ID. Patient had swallow evaluation and recommended dysphagia 2 diet. Antibiotics switched to oral. Mental status improved to baseline. Patient also found to have new onset atrial fibrillation with RVR that improved with metoprolol and was started on Lovenox here but patient not a candidate for long term anticoagulation secondary to advance dementia and fall risk and risk of bleeding but ok with aspirin 325mg po daily. Patient to go to [facility] and will f/u with cardiologist, PCP as out patient.  patient was in rehab and noted to be weak , nd pale , and had hematochezia and was transferred for low hg 7.1 and in er 6.9 and transfused , and admitted for GI bleed ,   case rev with dtr and agree with plan transfuse , and monitor ,   patient has multiple medical issues and is DNR and DNI ,   prognosis is poor ,

## 2018-08-20 NOTE — H&P ADULT - HISTORY OF PRESENT ILLNESS
89F with PMH of advanced dementia, HTN, HLD, TIA, GERD, Barretts esophagus, CAD s/p (2 stents; 2002, 2012) admitted for sepsis 2/2 UTI, gram + bacteremia,  AMS likely  hospital acquired delirium and suspected metabolic encephalopathy secondary to pyelonephritis,  SARA, and now new onset Afib with RVR. Patient was on 1:1 observation for safety. And was seen by Neuro and Psych. Patient was started on IV antibiotics for UTI. Blood cultures were negative, one sample came back positive for alpha hemolytic Strep that was likely contaminant per ID. Patient had swallow evaluation and recommended dysphagia 2 diet. Antibiotics switched to oral. Mental status improved to baseline. Patient also found to have new onset atrial fibrillation with RVR that improved with metoprolol and was started on Lovenox here but patient not a candidate for long term anticoagulation secondary to advance dementia and fall risk and risk of bleeding but ok with aspirin 325mg po daily. Patient to go to [facility] and will f/u with cardiologist, PCP as out patient.  patient was in rehab and noted to be weak , nd pale , and had hematochezia and was transferred for low hg 7.1 and in er 6.9 and transfused , and admitted for GI bleed ,   case rev with dtr and agree with plan transfuse , and monitor ,   patient has multiple medical issues and is DNR and DNI ,   prognosis is poor , in past hospitalization about 2 weeks ago the following happened : 89F with PMH of advanced dementia, HTN, HLD, TIA, GERD, Barretts esophagus, Carotid artery stenosis , right  s/p (2 stents; 2002, 2012) admitted for sepsis 2/2 UTI, gram + bacteremia,  AMS likely  hospital acquired delirium and suspected metabolic encephalopathy secondary to pyelonephritis,  SARA, and now new onset Afib with RVR. Patient was on 1:1 observation for safety. And was seen by Neuro and Psych. Patient was started on IV antibiotics for UTI. Blood cultures were negative, one sample came back positive for alpha hemolytic Strep that was likely contaminant per ID. Patient had swallow evaluation and recommended dysphagia 2 diet. Antibiotics switched to oral. Mental status improved to baseline. Patient also found to have new onset atrial fibrillation with RVR that improved with metoprolol and was started on Lovenox here but patient not a candidate for long term anticoagulation secondary to advance dementia and fall risk and risk of bleeding but ok with aspirin 325mg po daily. Patient to go to [facility] and will f/u with cardiologist, PCP as out patient.    patient was in rehab and noted to be weak , and pale , and had hematochezia and was transferred for low hg 7.1 and in er 6.9 and transfused , and admitted for GI bleed ,   patient agitated , confused , denies pain  no nausea , keeps moaning and tries to pull IV , getting blood in er , and also has a cough , and some gurgling ,   case rev with dtr and agree with plan transfuse , and monitor , aware of her agitation , and a fib , and ? TIA and cva issues   patient has multiple medical issues and is DNR and DNI ,   prognosis is poor ,

## 2018-08-20 NOTE — ED PROVIDER NOTE - CARE PLAN
Principal Discharge DX:	Hypothermia, initial encounter  Secondary Diagnosis:	Gastrointestinal hemorrhage, unspecified gastrointestinal hemorrhage type

## 2018-08-20 NOTE — H&P ADULT - PSH
Cataract    Cataract    H/O carotid endarterectomy    H/O:   x4  S/P cholecystectomy    S/P laparoscopic cholecystectomy

## 2018-08-20 NOTE — ED ADULT NURSE NOTE - OBJECTIVE STATEMENT
Pt. received awake and confused sent in from health care facility for black stools for past 2 days. Pt. presents w/ stage 1 pressure ulcer to sacral area. Pt. placed on continuous cardiac monitor with continuous pulse ox. EKG performed at bedside upon arrival.

## 2018-08-20 NOTE — ED ADULT TRIAGE NOTE - CHIEF COMPLAINT QUOTE
pt sent from [facility] for black stools, pt pale, and refusing oxygen and IV for ems, pt alert and confused, cool to touch, recent hospitalization

## 2018-08-21 ENCOUNTER — MEDICATION RENEWAL (OUTPATIENT)
Age: 83
End: 2018-08-21

## 2018-08-21 DIAGNOSIS — I48.91 UNSPECIFIED ATRIAL FIBRILLATION: ICD-10-CM

## 2018-08-21 LAB
ANION GAP SERPL CALC-SCNC: 10 MMOL/L — SIGNIFICANT CHANGE UP (ref 5–17)
BASOPHILS # BLD AUTO: 0.01 K/UL — SIGNIFICANT CHANGE UP (ref 0–0.2)
BASOPHILS NFR BLD AUTO: 0.2 % — SIGNIFICANT CHANGE UP (ref 0–2)
BUN SERPL-MCNC: 50 MG/DL — HIGH (ref 7–23)
CALCIUM SERPL-MCNC: 7.6 MG/DL — LOW (ref 8.5–10.1)
CHLORIDE SERPL-SCNC: 114 MMOL/L — HIGH (ref 96–108)
CO2 SERPL-SCNC: 21 MMOL/L — LOW (ref 22–31)
CREAT SERPL-MCNC: 1.1 MG/DL — SIGNIFICANT CHANGE UP (ref 0.5–1.3)
EOSINOPHIL # BLD AUTO: 0.03 K/UL — SIGNIFICANT CHANGE UP (ref 0–0.5)
EOSINOPHIL NFR BLD AUTO: 0.5 % — SIGNIFICANT CHANGE UP (ref 0–6)
GLUCOSE SERPL-MCNC: 88 MG/DL — SIGNIFICANT CHANGE UP (ref 70–99)
HCT VFR BLD CALC: 25.1 % — LOW (ref 34.5–45)
HGB BLD-MCNC: 8.4 G/DL — LOW (ref 11.5–15.5)
IMM GRANULOCYTES NFR BLD AUTO: 0.3 % — SIGNIFICANT CHANGE UP (ref 0–1.5)
LACTATE SERPL-SCNC: 1.4 MMOL/L — SIGNIFICANT CHANGE UP (ref 0.7–2)
LYMPHOCYTES # BLD AUTO: 1.21 K/UL — SIGNIFICANT CHANGE UP (ref 1–3.3)
LYMPHOCYTES # BLD AUTO: 18.5 % — SIGNIFICANT CHANGE UP (ref 13–44)
MCHC RBC-ENTMCNC: 28 PG — SIGNIFICANT CHANGE UP (ref 27–34)
MCHC RBC-ENTMCNC: 33.5 GM/DL — SIGNIFICANT CHANGE UP (ref 32–36)
MCV RBC AUTO: 83.7 FL — SIGNIFICANT CHANGE UP (ref 80–100)
MONOCYTES # BLD AUTO: 0.56 K/UL — SIGNIFICANT CHANGE UP (ref 0–0.9)
MONOCYTES NFR BLD AUTO: 8.6 % — SIGNIFICANT CHANGE UP (ref 2–14)
NEUTROPHILS # BLD AUTO: 4.71 K/UL — SIGNIFICANT CHANGE UP (ref 1.8–7.4)
NEUTROPHILS NFR BLD AUTO: 71.9 % — SIGNIFICANT CHANGE UP (ref 43–77)
NRBC # BLD: 0 /100 WBCS — SIGNIFICANT CHANGE UP (ref 0–0)
PLATELET # BLD AUTO: 213 K/UL — SIGNIFICANT CHANGE UP (ref 150–400)
POTASSIUM SERPL-MCNC: 3.7 MMOL/L — SIGNIFICANT CHANGE UP (ref 3.5–5.3)
POTASSIUM SERPL-SCNC: 3.7 MMOL/L — SIGNIFICANT CHANGE UP (ref 3.5–5.3)
RBC # BLD: 3 M/UL — LOW (ref 3.8–5.2)
RBC # FLD: 17.2 % — HIGH (ref 10.3–14.5)
SODIUM SERPL-SCNC: 145 MMOL/L — SIGNIFICANT CHANGE UP (ref 135–145)
WBC # BLD: 6.54 K/UL — SIGNIFICANT CHANGE UP (ref 3.8–10.5)
WBC # FLD AUTO: 6.54 K/UL — SIGNIFICANT CHANGE UP (ref 3.8–10.5)

## 2018-08-21 RX ORDER — PANTOPRAZOLE SODIUM 20 MG/1
40 TABLET, DELAYED RELEASE ORAL
Qty: 0 | Refills: 0 | Status: DISCONTINUED | OUTPATIENT
Start: 2018-08-21 | End: 2018-08-22

## 2018-08-21 RX ADMIN — Medication 25 MILLIGRAM(S): at 06:06

## 2018-08-21 RX ADMIN — PANTOPRAZOLE SODIUM 10 MG/HR: 20 TABLET, DELAYED RELEASE ORAL at 12:01

## 2018-08-21 NOTE — SWALLOW BEDSIDE ASSESSMENT ADULT - ORAL PHASE
Decreased anterior-posterior movement of the bolus/Delayed oral transit time Delayed oral transit time/Decreased anterior-posterior movement of the bolus/Lingual stasis Within functional limits

## 2018-08-21 NOTE — CONSULT NOTE ADULT - SUBJECTIVE AND OBJECTIVE BOX
Chief Complaint:  Patient is a 89y old  Female who presents with a chief complaint of anemia gi bleed 89 yo female with recent admission for sepsis BIBEMS from [facility] for possible GI bleed, dark stools and for being cool and pale.  +confusion, unsure what pt's baseline, no aide or family at bedside.  Rectal temp 95.  Patient is poor historian. Has MOLST is DNR/DNI she has no bm in [facility] yet here for follow up    Allergies:  No Known Allergies      Medications:  acetaminophen   Tablet. 650 milliGRAM(s) Oral every 6 hours PRN  levothyroxine Injectable 25 MICROGram(s) IV Push at bedtime  metoprolol succinate ER 25 milliGRAM(s) Oral daily  pantoprazole   Suspension 40 milliGRAM(s) Oral two times a day before meals      PMHX/PSHX:  CAD (coronary artery disease)  TIA (transient ischemic attack)  GERD (gastroesophageal reflux disease)  Damian esophagus  Arthritis  Hypertension  High cholesterol  Dementia  Hypothyroid  Low sodium levels  TIA (transient ischemic attack)  Arthritis  Hypercholesterolemia  HTN - Hypertension  GERD (gastroesophageal reflux disease)  Damian esophagus  Cataract  S/P cholecystectomy  Cataract  S/P laparoscopic cholecystectomy  H/O carotid endarterectomy  H/O:       Family history:  No pertinent family history in first degree relatives  No pertinent family history in first degree relatives      Social History: snf resident  no etoh no cigs no ivda    ROS:     General:  No wt loss, fevers, chills, night sweats, fatigue,   Eyes:  Good vision, no reported pain  ENT:  No sore throat, pain, runny nose, dysphagia  CV:  No pain, palpitations, hypo/hypertension  Resp:  No dyspnea, cough, tachypnea, wheezing  GI:  No pain, No nausea, No vomiting, No diarrhea, No constipation, No weight loss, No fever, No pruritis, No rectal bleeding, No tarry stools, No dysphagia,  :  No pain, bleeding, incontinence, nocturia  Muscle:  No pain, weakness  Neuro:  No weakness, tingling, memory problems  Psych:  No fatigue, insomnia, mood problems, depression  Endocrine:  No polyuria, polydipsia, cold/heat intolerance  Heme:  No petechiae, ecchymosis, easy bruisability  Skin:  No rash, tattoos, scars, edema      PHYSICAL EXAM:   Vital Signs:  Vital Signs Last 24 Hrs  T(C): 36.9 (21 Aug 2018 14:39), Max: 36.9 (21 Aug 2018 14:39)  T(F): 98.4 (21 Aug 2018 14:39), Max: 98.4 (21 Aug 2018 14:39)  HR: 72 (21 Aug 2018 14:39) (68 - 88)  BP: 122/62 (21 Aug 2018 14:39) (102/67 - 140/69)  BP(mean): --  RR: 17 (21 Aug 2018 14:39) (16 - 19)  SpO2: 94% (21 Aug 2018 14:39) (91% - 100%)  Daily     Daily     GENERAL:  Appears stated age, well-groomed, well-nourished, no distress  HEENT:  NC/AT,  conjunctivae clear and pink, no thyromegaly, nodules, adenopathy, no JVD, sclera -anicteric  CHEST:  Full & symmetric excursion, no increased effort, breath sounds clear  HEART:  Regular rhythm, S1, S2, no murmur/rub/S3/S4, no abdominal bruit, no edema  ABDOMEN:  Soft, non-tender, non-distended, normoactive bowel sounds,  no masses ,no hepato-splenomegaly, no signs of chronic liver disease  EXTEREMITIES:  no cyanosis,clubbing or edema  SKIN:  No rash/erythema/ecchymoses/petechiae/wounds/abscess/warm/dry  NEURO:  Alert, oriented, no asterixis, no tremor, no encephalopathy    LABS:                        8.4    6.54  )-----------( 213      ( 21 Aug 2018 08:32 )             25.1     08-21    145  |  114<H>  |  50<H>  ----------------------------<  88  3.7   |  21<L>  |  1.10    Ca    7.6<L>      21 Aug 2018 08:32    TPro  5.0<L>  /  Alb  2.0<L>  /  TBili  0.2  /  DBili  x   /  AST  15  /  ALT  18  /  AlkPhos  58  08-20    LIVER FUNCTIONS - ( 20 Aug 2018 15:25 )  Alb: 2.0 g/dL / Pro: 5.0 g/dL / ALK PHOS: 58 U/L / ALT: 18 U/L / AST: 15 U/L / GGT: x           PT/INR - ( 20 Aug 2018 15:25 )   PT: 12.5 sec;   INR: 1.14 ratio         PTT - ( 20 Aug 2018 15:25 )  PTT:34.4 sec  Urinalysis Basic - ( 20 Aug 2018 15:28 )    Color: Yellow / Appearance: Turbid / S.005 / pH: x  Gluc: x / Ketone: Negative  / Bili: Negative / Urobili: Negative   Blood: x / Protein: 25 mg/dL / Nitrite: Negative   Leuk Esterase: Moderate / RBC: 3-5 /HPF / WBC 26-50   Sq Epi: x / Non Sq Epi: Many / Bacteria: Moderate          Imaging:

## 2018-08-21 NOTE — PROGRESS NOTE ADULT - ASSESSMENT
89F with PMH of advanced dementia, HTN, HLD, TIA, GERD, Barretts esophagus, CAD s/p (2 stents; 2002, 2012) admitted for sepsis 2/2 UTI, gram + bacteremia,  AMS likely  hospital acquired delirium and suspected metabolic encephalopathy secondary to pyelonephritis,  SARA, and now new onset Afib with RVR. Patient was on 1:1 observation for safety. And was seen by Neuro and Psych. Patient was started on IV antibiotics for UTI. Blood cultures were negative, one sample came back positive for alpha hemolytic Strep that was likely contaminant per ID. Patient had swallow evaluation and recommended dysphagia 2 diet. Antibiotics switched to oral. Mental status improved to baseline. Patient also found to have new onset atrial fibrillation with RVR that improved with metoprolol and was started on Lovenox here but patient not a candidate for long term anticoagulation secondary to advance dementia and fall risk and risk of bleeding but ok with aspirin 325mg po daily. Patient to go to [facility] and will f/u with cardiologist, PCP as out patient.  patient was in rehab and noted to be weak , nd pale , and had hematochezia and was transferred for low hg 7.1 and in er 6.9 and transfused , and admitted for GI bleed ,   case rev with dtr and agree with plan transfuse , and monitor ,   patient has multiple medical issues and is DNR and DNI ,   prognosis is poor ,   patient admitted with guiac positive stool and low hg and post 2 units bloods ,   dtr agrees that patient may not be able to tolerate any procedures ie egd or colonoscopy , will d/w GI , will get patient and out of bed eval , on clear liquids 89F with PMH of advanced dementia, HTN, HLD, TIA, GERD, Barretts esophagus, CAD s/p (2 stents; 2002, 2012) admitted for sepsis 2/2 UTI, gram + bacteremia,  AMS likely  hospital acquired delirium and suspected metabolic encephalopathy secondary to pyelonephritis,  SARA, and now new onset Afib with RVR. Patient was on 1:1 observation for safety. And was seen by Neuro and Psych. Patient was started on IV antibiotics for UTI. Blood cultures were negative, one sample came back positive for alpha hemolytic Strep that was likely contaminant per ID. Patient had swallow evaluation and recommended dysphagia 2 diet. Antibiotics switched to oral. Mental status improved to baseline. Patient also found to have new onset atrial fibrillation with RVR that improved with metoprolol and was started on Lovenox here but patient not a candidate for long term anticoagulation secondary to advance dementia and fall risk and risk of bleeding but ok with aspirin 325mg po daily. Patient to go to [facility] and will f/u with cardiologist, PCP as out patient.  patient was in rehab and noted to be weak , nd pale , and had hematochezia and was transferred for low hg 7.1 and in er 6.9 and transfused , and admitted for GI bleed ,   case rev with dtr and agree with plan transfuse , and monitor ,   patient has multiple medical issues and is DNR and DNI ,   prognosis is poor ,   patient admitted with guiac positive stool and low hg and post 2 units bloods ,   dtr agrees that patient may not be able to tolerate any procedures ie egd or colonoscopy , will d/w GI , will get patient and out of bed eval , on clear liquids   patient also has mitral insufficiency and valvular dx , high risk for cva due to a fib and abnl valves 89F with PMH of advanced dementia, HTN, HLD, TIA, GERD, Barretts esophagus, CAD s/p (2 stents; 2002, 2012) admitted for sepsis 2/2 UTI, gram + bacteremia,  AMS likely  hospital acquired delirium and suspected metabolic encephalopathy secondary to pyelonephritis,  SARA, and now new onset Afib with RVR. Patient was on 1:1 observation for safety. And was seen by Neuro and Psych. Patient was started on IV antibiotics for UTI. Blood cultures were negative, one sample came back positive for alpha hemolytic Strep that was likely contaminant per ID. Patient had swallow evaluation and recommended dysphagia 2 diet. Antibiotics switched to oral. Mental status improved to baseline. Patient also found to have new onset atrial fibrillation with RVR that improved with metoprolol and was started on Lovenox here but patient not a candidate for long term anticoagulation secondary to advance dementia and fall risk and risk of bleeding but ok with aspirin 325mg po daily. Patient to go to [facility] and will f/u with cardiologist, PCP as out patient. Pt also has valvular insufficieny and dx and is at high risk of cva or tia ,   patient was in rehab and noted to be weak , nd pale , and had hematochezia and was transferred for low hg 7.1 and in er 6.9 and transfused , and admitted for GI bleed ,   case rev with dtr and agree with plan transfuse , and monitor ,   patient has multiple medical issues and is DNR and DNI ,   prognosis is poor ,   patient admitted with guiac positive stool and low hg and post 2 units bloods ,   dtr agrees that patient may not be able to tolerate any procedures ie egd or colonoscopy , will d/w GI , will get patient and out of bed eval , on clear liquids   patient also has mitral insufficiency and valvular dx , high risk for cva due to a fib and abnl valves

## 2018-08-21 NOTE — PROGRESS NOTE ADULT - SUBJECTIVE AND OBJECTIVE BOX
PCP  Subjective:   in bed , awake , confused , weak ,     Objective:   Vital Signs Last 24 Hrs  T(C): 36.7 (18 @ 06:11), Max: 36.7 (18 @ 01:46)  T(F): 98 (18 @ 06:11), Max: 98.1 (18 @ 01:46)  HR: 80 (18 @ 06:11) (68 - 97)  BP: 120/51 (18 @ 06:11) (102/67 - 158/40)  BP(mean): --  RR: 18 (18 @ 06:11) (16 - 19)  SpO2: 95% (18 @ 06:11) (90% - 100%)  Daily     Daily     GENERAL:  wdwn frail female , awake , confused ,   EYES: eomi  NECK: supple ,   CHEST/LUNG: occasional rales , congestion  HEART: s1 s2 irregularly irregular 2/6 justine   ABDOMEN:  soft , nt + bowel sound   EXTREMITIES:  trace edema bilateral lower extremities   SKIN:  warm , ecchymosis , pale , dry   CNS:  awake , responds , confused , weak , poor balance , seems to be moaning most of  the time     Allergies: Allergies    No Known Allergies    Intolerances        Home Medications:  acetaminophen 325 mg oral tablet: 2 tab(s) orally every 8 hours, As Needed -pain/ back (26 Oct 2017 09:37)  aspirin 325 mg oral tablet: 1 tab(s) orally once a day (16 Aug 2018 07:26)  aspirin 81 mg oral tablet: 1 tab(s) orally once a day (26 Oct 2017 09:37)  cephalexin 250 mg oral capsule: 1 cap(s) orally 4 times a day x 5 days (16 Aug 2018 07:26)  levothyroxine: 75 milligram(s) orally once a day (26 Oct 2017 09:37)  metoprolol tartrate 25 mg oral tablet: 1 tab(s) orally every 8 hours (16 Aug 2018 07:26)  RABEprazole 20 mg oral tablet, extended release: 2 tab(s) orally once a day (26 Oct 2017 09:37)  RABEprazole 20 mg oral tablet, extended release: 1 cap(s) orally 3 times a week (16 Aug 2018 07:26)  Synthroid 75 mcg (0.075 mg) oral tablet: 1 tab(s) orally once a day (16 Aug 2018 07:26)  Vitamin B12:  (16 Aug 2018 07:26)  Vitamin D3 1000 intl units oral tablet: 1 tab(s) orally once a day (26 Oct 2017 09:37)  Vitamin D3 1000 intl units oral tablet: 1 tab(s) orally once a day (16 Aug 2018 07:26)    Medications:   acetaminophen   Tablet. 650 milliGRAM(s) Oral every 6 hours PRN  levothyroxine Injectable 25 MICROGram(s) IV Push at bedtime  metoprolol succinate ER 25 milliGRAM(s) Oral daily  pantoprazole Infusion 8 mG/Hr IV Continuous <Continuous>      LABS:                        6.8    10.07 )-----------( 306      ( 20 Aug 2018 15:25 )             21.6     08-20    142  |  109<H>  |  56<H>  ----------------------------<  99  4.5   |  20<L>  |  1.30    Ca    8.0<L>      20 Aug 2018 15:25    TPro  5.0<L>  /  Alb  2.0<L>  /  TBili  0.2  /  DBili  x   /  AST  15  /  ALT  18  /  AlkPhos  58  08-20    PT/INR - ( 20 Aug 2018 15:25 )   PT: 12.5 sec;   INR: 1.14 ratio         PTT - ( 20 Aug 2018 15:25 )  PTT:34.4 sec  20 @ 15:25  INR 1.14    Urinalysis Basic - ( 20 Aug 2018 15:28 )    Color: Yellow / Appearance: Turbid / S.005 / pH: x  Gluc: x / Ketone: Negative  / Bili: Negative / Urobili: Negative   Blood: x / Protein: 25 mg/dL / Nitrite: Negative   Leuk Esterase: Moderate / RBC: 3-5 /HPF / WBC 26-50   Sq Epi: x / Non Sq Epi: Many / Bacteria: Moderate        CAPILLARY BLOOD GLUCOSE              RECENT CULTURES: PCP  Subjective:   in bed , awake , confused , weak ,     Objective:   Vital Signs Last 24 Hrs  T(C): 36.7 (18 @ 06:11), Max: 36.7 (18 @ 01:46)  T(F): 98 (18 @ 06:11), Max: 98.1 (18 @ 01:46)  HR: 80 (18 @ 06:11) (68 - 97)  BP: 120/51 (18 @ 06:11) (102/67 - 158/40)  BP(mean): --  RR: 18 (18 @ 06:11) (16 - 19)  SpO2: 95% (18 @ 06:11) (90% - 100%)  Daily     Daily     GENERAL:  wdwn frail female , awake , confused ,   EYES: eomi  NECK: supple ,   CHEST/LUNG: occasional rales , congestion  HEART: s1 s2 irregularly irregular 2/6 justine   ABDOMEN:  soft , nt + bowel sound   EXTREMITIES:  trace edema bilateral lower extremities   SKIN:  warm , ecchymosis , pale , dry   CNS:  awake , responds , confused , weak , poor balance , seems to be moaning most of  the time     Allergies: Allergies    No Known Allergies    Intolerances        Home Medications:  acetaminophen 325 mg oral tablet: 2 tab(s) orally every 8 hours, As Needed -pain/ back (26 Oct 2017 09:37)  aspirin 325 mg oral tablet: 1 tab(s) orally once a day (16 Aug 2018 07:26)  aspirin 81 mg oral tablet: 1 tab(s) orally once a day (26 Oct 2017 09:37)  cephalexin 250 mg oral capsule: 1 cap(s) orally 4 times a day x 5 days (16 Aug 2018 07:26)  levothyroxine: 75 milligram(s) orally once a day (26 Oct 2017 09:37)  metoprolol tartrate 25 mg oral tablet: 1 tab(s) orally every 8 hours (16 Aug 2018 07:26)  RABEprazole 20 mg oral tablet, extended release: 2 tab(s) orally once a day (26 Oct 2017 09:37)  RABEprazole 20 mg oral tablet, extended release: 1 cap(s) orally 3 times a week (16 Aug 2018 07:26)  Synthroid 75 mcg (0.075 mg) oral tablet: 1 tab(s) orally once a day (16 Aug 2018 07:26)  Vitamin B12:  (16 Aug 2018 07:26)  Vitamin D3 1000 intl units oral tablet: 1 tab(s) orally once a day (26 Oct 2017 09:37)  Vitamin D3 1000 intl units oral tablet: 1 tab(s) orally once a day (16 Aug 2018 07:26)    Medications:   acetaminophen   Tablet. 650 milliGRAM(s) Oral every 6 hours PRN  levothyroxine Injectable 25 MICROGram(s) IV Push at bedtime  metoprolol succinate ER 25 milliGRAM(s) Oral daily  pantoprazole Infusion 8 mG/Hr IV Continuous <Continuous>      LABS:                        6.8    10.07 )-----------( 306      ( 20 Aug 2018 15:25 )             21.6     08    142  |  109<H>  |  56<H>  ----------------------------<  99  4.5   |  20<L>  |  1.30    Ca    8.0<L>      20 Aug 2018 15:25    TPro  5.0<L>  /  Alb  2.0<L>  /  TBili  0.2  /  DBili  x   /  AST  15  /  ALT  18  /  AlkPhos  58  20    PT/INR - ( 20 Aug 2018 15:25 )   PT: 12.5 sec;   INR: 1.14 ratio         PTT - ( 20 Aug 2018 15:25 )  PTT:34.4 sec   @ 15:25  INR 1.14    Urinalysis Basic - ( 20 Aug 2018 15:28 )    Color: Yellow / Appearance: Turbid / S.005 / pH: x  Gluc: x / Ketone: Negative  / Bili: Negative / Urobili: Negative   Blood: x / Protein: 25 mg/dL / Nitrite: Negative   Leuk Esterase: Moderate / RBC: 3-5 /HPF / WBC 26-50   Sq Epi: x / Non Sq Epi: Many / Bacteria: Moderate        CAPILLARY BLOOD GLUCOSE      < from: TTE Echo Doppler w/o Cont (18 @ 11:57) >   EXAM:  ECHO TTE W/O CON COMP W/DOPPLR         PROCEDURE DATE:  2018        INTERPRETATION:  Ordering Physician: COLTON PÉREZ [id number]    Indication: Abnormal EKG  Technologist Initials: ALISE  Study Quality: Technically difficult   A completeechocardiographic study was performed utilizing standard   protocol including spectral and color Doppler in all echocardiographic   windows.    Weight: 68 kg  Blood Pressure: 136/81    MEASUREMENTS  IVS: 1.0cm  PWT: 0.9cm  LA: 3.7cm  AO: 3.7cm  LVIDd: 4.1cm  LVIDs: 2.5cm    LVEF: 65-70%  RVSP: 47mmHg    FINDINGS  Left Ventricle: The endocardium is not well-visualized. Grossly, there is   normal left ventricular systolic function.  Aortic Valve: Calcified trileaflet aortic valve. Minimal aortic   insufficiency.  Mitral Valve: Mitral annular calcification and calcified mitral valve   leaflets. Moderate mitral insufficiency.  Tricuspid Valve: Normal tricuspid valve. Moderate tricuspid insufficiency.  Pulmonic Valve: Normal pulmonic valve. Mild pulmonic insufficiency.  Left Atrium: Mildly enlarged  Right Ventricle: Normal right ventricular size and systolic function.  Right Atrium: Mildly enlarged  Pericardium/Pleura: Normal pericardium with no pericardial effusion. Left   pleural effusion.      CONCLUSIONS:  Technically difficult and limited study.     1.  The endocardium is not well-visualized. Grossly, there is normal left   ventricular systolic function.   2.  Calcified trileaflet aortic valve with minimal aortic insufficiency.   3.  Mitral annular calcification and calcified mitral leaflets with   moderate mitral insufficiency.   4.  Normal tricuspid valve moderate tricuspid insufficiency.   5.  Mild left atrial enlargement   6.  Normal right ventricular size and systolic function. Mild right   atrial enlargement  7.  Left pleural effusion.    < end of copied text >          RECENT CULTURES:

## 2018-08-21 NOTE — SWALLOW BEDSIDE ASSESSMENT ADULT - COMMENTS
Pt is 89 y/o female admitted w/GI bleed. Hx advanced dementia, Barretts esophagus, TIA, GERD. CXR shows bilateral pleural effusions. CT head no acute infarct. Pt evaluated by this discipline in 7/2018, recommended dysphagia 2/nectar thick liquids. Pt exhibits oropharyngeal dysphagia marked by inability to appropriately masticate solids, delay in pharyngeal swallow across consistencies presented, intermittent throat clear given large cup sips/straw sips clear liquids. Recommend continue clear liquids via small sips. Would recommend puree textures once patient is cleared for solid PO intake by GI. Left message w/Dr. Escobedo. Educated CNA regarding use of small sips Pt is 89 y/o female admitted w/GI bleed. Hx advanced dementia, Barretts esophagus, TIA, GERD. CXR shows bilateral pleural effusions. CT head no acute infarct. Pt evaluated by this discipline in 7/2018, recommended dysphagia 2/nectar thick liquids. Pt exhibits oropharyngeal dysphagia marked by inability to appropriately masticate solids, delay in pharyngeal swallow across consistencies presented, intermittent throat clear given large cup sips/straw sips clear liquids. Recommend continue clear liquids (thin) via small sips. Would recommend puree textures once patient is cleared for solid PO intake by GI. Left message w/Dr. Escobedo. Educated CNA regarding use of small sips. SLP will follow up

## 2018-08-21 NOTE — SWALLOW BEDSIDE ASSESSMENT ADULT - PHARYNGEAL PHASE
Delayed pharyngeal swallow Delayed pharyngeal swallow/Multiple swallows Multiple swallows/Delayed pharyngeal swallow

## 2018-08-22 LAB
-  AMIKACIN: SIGNIFICANT CHANGE UP
-  AMOXICILLIN/CLAVULANIC ACID: SIGNIFICANT CHANGE UP
-  AMPICILLIN/SULBACTAM: SIGNIFICANT CHANGE UP
-  AMPICILLIN: SIGNIFICANT CHANGE UP
-  AMPICILLIN: SIGNIFICANT CHANGE UP
-  AZTREONAM: SIGNIFICANT CHANGE UP
-  CEFAZOLIN: SIGNIFICANT CHANGE UP
-  CEFEPIME: SIGNIFICANT CHANGE UP
-  CEFOXITIN: SIGNIFICANT CHANGE UP
-  CEFTRIAXONE: SIGNIFICANT CHANGE UP
-  CIPROFLOXACIN: SIGNIFICANT CHANGE UP
-  CIPROFLOXACIN: SIGNIFICANT CHANGE UP
-  DAPTOMYCIN: SIGNIFICANT CHANGE UP
-  ERTAPENEM: SIGNIFICANT CHANGE UP
-  GENTAMICIN: SIGNIFICANT CHANGE UP
-  IMIPENEM: SIGNIFICANT CHANGE UP
-  LEVOFLOXACIN: SIGNIFICANT CHANGE UP
-  LEVOFLOXACIN: SIGNIFICANT CHANGE UP
-  LINEZOLID: SIGNIFICANT CHANGE UP
-  MEROPENEM: SIGNIFICANT CHANGE UP
-  NITROFURANTOIN: SIGNIFICANT CHANGE UP
-  NITROFURANTOIN: SIGNIFICANT CHANGE UP
-  PIPERACILLIN/TAZOBACTAM: SIGNIFICANT CHANGE UP
-  TETRACYCLINE: SIGNIFICANT CHANGE UP
-  TIGECYCLINE: SIGNIFICANT CHANGE UP
-  TOBRAMYCIN: SIGNIFICANT CHANGE UP
-  TRIMETHOPRIM/SULFAMETHOXAZOLE: SIGNIFICANT CHANGE UP
-  VANCOMYCIN: SIGNIFICANT CHANGE UP
ANION GAP SERPL CALC-SCNC: 10 MMOL/L — SIGNIFICANT CHANGE UP (ref 5–17)
BASOPHILS # BLD AUTO: 0.02 K/UL — SIGNIFICANT CHANGE UP (ref 0–0.2)
BASOPHILS NFR BLD AUTO: 0.4 % — SIGNIFICANT CHANGE UP (ref 0–2)
BUN SERPL-MCNC: 37 MG/DL — HIGH (ref 7–23)
CALCIUM SERPL-MCNC: 7.8 MG/DL — LOW (ref 8.5–10.1)
CHLORIDE SERPL-SCNC: 114 MMOL/L — HIGH (ref 96–108)
CO2 SERPL-SCNC: 22 MMOL/L — SIGNIFICANT CHANGE UP (ref 22–31)
CREAT SERPL-MCNC: 1.1 MG/DL — SIGNIFICANT CHANGE UP (ref 0.5–1.3)
CULTURE RESULTS: SIGNIFICANT CHANGE UP
EOSINOPHIL # BLD AUTO: 0.03 K/UL — SIGNIFICANT CHANGE UP (ref 0–0.5)
EOSINOPHIL NFR BLD AUTO: 0.6 % — SIGNIFICANT CHANGE UP (ref 0–6)
GLUCOSE SERPL-MCNC: 92 MG/DL — SIGNIFICANT CHANGE UP (ref 70–99)
HCT VFR BLD CALC: 23.7 % — LOW (ref 34.5–45)
HCT VFR BLD CALC: 23.9 % — LOW (ref 34.5–45)
HGB BLD-MCNC: 7.7 G/DL — LOW (ref 11.5–15.5)
HGB BLD-MCNC: 7.9 G/DL — LOW (ref 11.5–15.5)
IMM GRANULOCYTES NFR BLD AUTO: 0.2 % — SIGNIFICANT CHANGE UP (ref 0–1.5)
LYMPHOCYTES # BLD AUTO: 1.02 K/UL — SIGNIFICANT CHANGE UP (ref 1–3.3)
LYMPHOCYTES # BLD AUTO: 21.1 % — SIGNIFICANT CHANGE UP (ref 13–44)
MCHC RBC-ENTMCNC: 27.8 PG — SIGNIFICANT CHANGE UP (ref 27–34)
MCHC RBC-ENTMCNC: 32.5 GM/DL — SIGNIFICANT CHANGE UP (ref 32–36)
MCV RBC AUTO: 85.6 FL — SIGNIFICANT CHANGE UP (ref 80–100)
METHOD TYPE: SIGNIFICANT CHANGE UP
METHOD TYPE: SIGNIFICANT CHANGE UP
MONOCYTES # BLD AUTO: 0.35 K/UL — SIGNIFICANT CHANGE UP (ref 0–0.9)
MONOCYTES NFR BLD AUTO: 7.2 % — SIGNIFICANT CHANGE UP (ref 2–14)
NEUTROPHILS # BLD AUTO: 3.41 K/UL — SIGNIFICANT CHANGE UP (ref 1.8–7.4)
NEUTROPHILS NFR BLD AUTO: 70.5 % — SIGNIFICANT CHANGE UP (ref 43–77)
NRBC # BLD: 0 /100 WBCS — SIGNIFICANT CHANGE UP (ref 0–0)
OB PNL STL: POSITIVE
ORGANISM # SPEC MICROSCOPIC CNT: SIGNIFICANT CHANGE UP
PLATELET # BLD AUTO: 215 K/UL — SIGNIFICANT CHANGE UP (ref 150–400)
POTASSIUM SERPL-MCNC: 3.9 MMOL/L — SIGNIFICANT CHANGE UP (ref 3.5–5.3)
POTASSIUM SERPL-SCNC: 3.9 MMOL/L — SIGNIFICANT CHANGE UP (ref 3.5–5.3)
RBC # BLD: 2.77 M/UL — LOW (ref 3.8–5.2)
RBC # FLD: 17.8 % — HIGH (ref 10.3–14.5)
SODIUM SERPL-SCNC: 146 MMOL/L — HIGH (ref 135–145)
SPECIMEN SOURCE: SIGNIFICANT CHANGE UP
WBC # BLD: 4.84 K/UL — SIGNIFICANT CHANGE UP (ref 3.8–10.5)
WBC # FLD AUTO: 4.84 K/UL — SIGNIFICANT CHANGE UP (ref 3.8–10.5)

## 2018-08-22 RX ORDER — PANTOPRAZOLE SODIUM 20 MG/1
40 TABLET, DELAYED RELEASE ORAL
Qty: 0 | Refills: 0 | Status: DISCONTINUED | OUTPATIENT
Start: 2018-08-22 | End: 2018-08-23

## 2018-08-22 RX ADMIN — Medication 25 MICROGRAM(S): at 22:31

## 2018-08-22 RX ADMIN — PANTOPRAZOLE SODIUM 40 MILLIGRAM(S): 20 TABLET, DELAYED RELEASE ORAL at 06:21

## 2018-08-22 RX ADMIN — Medication 25 MILLIGRAM(S): at 06:21

## 2018-08-22 NOTE — PROGRESS NOTE ADULT - PROBLEM SELECTOR PLAN 1
am labs pending, no overt s/s gib overnight per nursing  sp 2u prbc 8/21  daily cbc , transfuse prn   cont ppi  consider iron studies   f/u fobt  consider hematology eval  further recommendations pending above am labs pending, no overt s/s gib overnight per nursing  sp 2u prbc 8/21  daily cbc , transfuse prn   cont ppi  consider iron studies   f/u fobt  consider hematology eval  cta/bleeding scan if with active s/s gib  further recommendations pending above

## 2018-08-22 NOTE — DIETITIAN INITIAL EVALUATION ADULT. - OTHER INFO
Daughter reports UBW in 120's.  Was ~115# few weeks ago.  Says was here end of July then went to rehab and really wasn't eating since all that. Seen by SLP in July and again 8/21.  Pureed diet with thin liquids OK but small sips only.  Observed bfst tray- pt ate only 4oz juice and 75% of hot cereal.  Pt received 2U PRBC 8/21.

## 2018-08-22 NOTE — PROGRESS NOTE ADULT - SUBJECTIVE AND OBJECTIVE BOX
INTERVAL HPI/OVERNIGHT EVENTS:  pt seen and examined  some confusion but denies n/v/d/abd pain +bm today  per overnight rn no s/s overt gib  labs pending afebrile overnight    MEDICATIONS  (STANDING):  levothyroxine Injectable 25 MICROGram(s) IV Push at bedtime  metoprolol succinate ER 25 milliGRAM(s) Oral daily  pantoprazole   Suspension 40 milliGRAM(s) Oral two times a day before meals    MEDICATIONS  (PRN):  acetaminophen   Tablet. 650 milliGRAM(s) Oral every 6 hours PRN Mild Pain (1 - 3)      Allergies    No Known Allergies    Intolerances        Review of Systems:  unable to obtain in entirety    Vital Signs Last 24 Hrs  T(C): 36.4 (22 Aug 2018 05:16), Max: 36.9 (21 Aug 2018 14:39)  T(F): 97.5 (22 Aug 2018 05:16), Max: 98.4 (21 Aug 2018 14:39)  HR: 71 (22 Aug 2018 05:16) (71 - 79)  BP: 126/57 (22 Aug 2018 05:16) (100/57 - 126/57)  BP(mean): --  RR: 17 (22 Aug 2018 05:16) (17 - 17)  SpO2: 92% (22 Aug 2018 05:16) (92% - 94%)    PHYSICAL EXAM:    Constitutional: NAD, lying in bed  HEENT: EOMI, perrl  Neck: No LAD  Respiratory: dec bs  Cardiovascular: S1 and S2, RRR  Gastrointestinal: BS+, soft, NT/ND  Extremities: No peripheral edema  Vascular: 2+ peripheral pulses  Neurological: Awake alert some confusion  Skin: No rashes      LABS:                        8.4    6.54  )-----------( 213      ( 21 Aug 2018 08:32 )             25.1     08-21    145  |  114<H>  |  50<H>  ----------------------------<  88  3.7   |  21<L>  |  1.10    Ca    7.6<L>      21 Aug 2018 08:32    TPro  5.0<L>  /  Alb  2.0<L>  /  TBili  0.2  /  DBili  x   /  AST  15  /  ALT  18  /  AlkPhos  58  08-20    PT/INR - ( 20 Aug 2018 15:25 )   PT: 12.5 sec;   INR: 1.14 ratio         PTT - ( 20 Aug 2018 15:25 )  PTT:34.4 sec  Urinalysis Basic - ( 20 Aug 2018 15:28 )    Color: Yellow / Appearance: Turbid / S.005 / pH: x  Gluc: x / Ketone: Negative  / Bili: Negative / Urobili: Negative   Blood: x / Protein: 25 mg/dL / Nitrite: Negative   Leuk Esterase: Moderate / RBC: 3-5 /HPF / WBC 26-50   Sq Epi: x / Non Sq Epi: Many / Bacteria: Moderate        RADIOLOGY & ADDITIONAL TESTS:

## 2018-08-22 NOTE — PROGRESS NOTE ADULT - ASSESSMENT
89F with PMH of advanced dementia, HTN, HLD, TIA, GERD, Barretts esophagus, CAD s/p (2 stents; 2002, 2012) admitted for sepsis 2/2 UTI, gram + bacteremia,  AMS likely  hospital acquired delirium and suspected metabolic encephalopathy secondary to pyelonephritis,  SARA, and now new onset Afib with RVR. Patient was on 1:1 observation for safety. And was seen by Neuro and Psych. Patient was started on IV antibiotics for UTI. Blood cultures were negative, one sample came back positive for alpha hemolytic Strep that was likely contaminant per ID. Patient had swallow evaluation and recommended dysphagia 2 diet. Antibiotics switched to oral. Mental status improved to baseline. Patient also found to have new onset atrial fibrillation with RVR that improved with metoprolol and was started on Lovenox here but patient not a candidate for long term anticoagulation secondary to advance dementia and fall risk and risk of bleeding but ok with aspirin 325mg po daily. Patient to go to [facility] and will f/u with cardiologist, PCP as out patient. Pt also has valvular insufficieny and dx and is at high risk of cva or tia ,   patient was in rehab and noted to be weak , nd pale , and had hematochezia and was transferred for low hg 7.1 and in er 6.9 and transfused , and admitted for GI bleed ,   case rev with dtr and agree with plan transfuse , and monitor ,   patient has multiple medical issues and is DNR and DNI ,   prognosis is poor ,   patient admitted with guiac positive stool and low hg and post 2 units bloods ,   dtr agrees that patient may not be able to tolerate any procedures ie egd or colonoscopy , will d/w GI , will get patient and out of bed eval , on clear liquids   patient also has mitral insufficiency and valvular dx , high risk for cva due to a fib and abnl valves   patient hg dropped again , will repeat in am , on PPI , and will have to consider starting Lovenox for dvt prophylax , dtr aware of risk of cva tia , and over all a poor candidate for any aggressive therapy ,   GI noted ,   also uti enterococcus ? colonization will monitor for now

## 2018-08-22 NOTE — DIETITIAN INITIAL EVALUATION ADULT. - SIGNS/SYMPTOMS
evidenced by bedside swallow eval in July and 8/21. evidenced by recent wt loss, intake < estd needs

## 2018-08-22 NOTE — DIETITIAN INITIAL EVALUATION ADULT. - ETIOLOGY
related to motor causes/ hx dementia related to decreased ability to consume sufficient protein/energy

## 2018-08-22 NOTE — PHYSICAL THERAPY INITIAL EVALUATION ADULT - PERTINENT HX OF CURRENT PROBLEM, REHAB EVAL
89 y/o female adm 8/20 from [facility] with sepsis and altered mental status , CT of the head (-)

## 2018-08-22 NOTE — PROGRESS NOTE ADULT - SUBJECTIVE AND OBJECTIVE BOX
PCP  Subjective:   in bed , sleeping alot also agitated and pulled iv line , poor oral intake     Objective:   Vital Signs Last 24 Hrs  T(C): 36.4 (18 @ 05:16), Max: 36.9 (18 @ 14:39)  T(F): 97.5 (18 @ 05:16), Max: 98.4 (18 @ 14:39)  HR: 71 (18 @ 05:16) (71 - 79)  BP: 126/57 (18 @ 05:16) (100/57 - 126/57)  BP(mean): --  RR: 17 (18 @ 05:16) (17 - 17)  SpO2: 92% (18 @ 05:16) (92% - 94%)  Daily     Daily Weight in k.5 (22 Aug 2018 10:03)    GENERAL:  wdwn female , sleeping , arousable , confused , weak ,   EYES: eomi  NECK: supple , but general stiffness  CHEST/LUNG: clear anteriorly   HEART: s1 s2 irregularly irregular 2/6 justine   ABDOMEN:  soft NT + bs  EXTREMITIES:  no edema   SKIN:  warm   CNS:  awake , , lethargic , weak , poor balance , general stiffness     Allergies: Allergies    No Known Allergies    Intolerances        Home Medications:  acetaminophen 325 mg oral tablet: 2 tab(s) orally every 8 hours, As Needed -pain/ back (26 Oct 2017 09:37)  aspirin 325 mg oral tablet: 1 tab(s) orally once a day (16 Aug 2018 07:26)  aspirin 81 mg oral tablet: 1 tab(s) orally once a day (26 Oct 2017 09:37)  cephalexin 250 mg oral capsule: 1 cap(s) orally 4 times a day x 5 days (16 Aug 2018 07:26)  levothyroxine: 75 milligram(s) orally once a day (26 Oct 2017 09:37)  metoprolol tartrate 25 mg oral tablet: 1 tab(s) orally every 8 hours (16 Aug 2018 07:26)  RABEprazole 20 mg oral tablet, extended release: 2 tab(s) orally once a day (26 Oct 2017 09:37)  RABEprazole 20 mg oral tablet, extended release: 1 cap(s) orally 3 times a week (16 Aug 2018 07:26)  Synthroid 75 mcg (0.075 mg) oral tablet: 1 tab(s) orally once a day (16 Aug 2018 07:26)  Vitamin B12:  (16 Aug 2018 07:26)  Vitamin D3 1000 intl units oral tablet: 1 tab(s) orally once a day (26 Oct 2017 09:37)  Vitamin D3 1000 intl units oral tablet: 1 tab(s) orally once a day (16 Aug 2018 07:26)    Medications:   acetaminophen   Tablet. 650 milliGRAM(s) Oral every 6 hours PRN  levothyroxine Injectable 25 MICROGram(s) IV Push at bedtime  metoprolol succinate ER 25 milliGRAM(s) Oral daily  pantoprazole   Suspension 40 milliGRAM(s) Oral two times a day before meals      LABS:                        7.7    4.84  )-----------( 215      ( 22 Aug 2018 08:42 )             23.7     08    146<H>  |  114<H>  |  37<H>  ----------------------------<  92  3.9   |  22  |  1.10    Ca    7.8<L>      22 Aug 2018 08:42    TPro  5.0<L>  /  Alb  2.0<L>  /  TBili  0.2  /  DBili  x   /  AST  15  /  ALT  18  /  AlkPhos  58  08-20    PT/INR - ( 20 Aug 2018 15:25 )   PT: 12.5 sec;   INR: 1.14 ratio         PTT - ( 20 Aug 2018 15:25 )  PTT:34.4 sec   @ 15:25  INR 1.14    Urinalysis Basic - ( 20 Aug 2018 15:28 )    Color: Yellow / Appearance: Turbid / S.005 / pH: x  Gluc: x / Ketone: Negative  / Bili: Negative / Urobili: Negative   Blood: x / Protein: 25 mg/dL / Nitrite: Negative   Leuk Esterase: Moderate / RBC: 3-5 /HPF / WBC 26-50   Sq Epi: x / Non Sq Epi: Many / Bacteria: Moderate        CAPILLARY BLOOD GLUCOSE              RECENT CULTURES:  Culture Results:   No growth to date. ( @ 19:03)  Culture Results:   No growth to date. ( @ 19:03)  Culture Results:   >100,000 CFU/ml Enterococcus faecium  50,000 - 99,000 CFU/mL Morganella morganii ( @ 18:37)        Culture - Blood (collected 18 @ 19:03)  Source: .Blood Blood  Preliminary Report (18 @ 20:01):    No growth to date.    Culture - Blood (collected 18 @ 19:03)  Source: .Blood Blood  Preliminary Report (18 @ 20:01):    No growth to date.    Culture - Urine (collected 18 @ 18:37)  Source: .Urine Clean Catch (Midstream)  Preliminary Report (18 @ 18:59):    >100,000 CFU/ml Enterococcus faecium    50,000 - 99,000 CFU/mL Morganella morganii

## 2018-08-23 ENCOUNTER — TRANSCRIPTION ENCOUNTER (OUTPATIENT)
Age: 83
End: 2018-08-23

## 2018-08-23 LAB
ANION GAP SERPL CALC-SCNC: 9 MMOL/L — SIGNIFICANT CHANGE UP (ref 5–17)
BASOPHILS # BLD AUTO: 0.02 K/UL — SIGNIFICANT CHANGE UP (ref 0–0.2)
BASOPHILS NFR BLD AUTO: 0.4 % — SIGNIFICANT CHANGE UP (ref 0–2)
BLD GP AB SCN SERPL QL: SIGNIFICANT CHANGE UP
BUN SERPL-MCNC: 33 MG/DL — HIGH (ref 7–23)
CALCIUM SERPL-MCNC: 7.6 MG/DL — LOW (ref 8.5–10.1)
CHLORIDE SERPL-SCNC: 111 MMOL/L — HIGH (ref 96–108)
CO2 SERPL-SCNC: 21 MMOL/L — LOW (ref 22–31)
CREAT SERPL-MCNC: 1.1 MG/DL — SIGNIFICANT CHANGE UP (ref 0.5–1.3)
EOSINOPHIL # BLD AUTO: 0.03 K/UL — SIGNIFICANT CHANGE UP (ref 0–0.5)
EOSINOPHIL NFR BLD AUTO: 0.7 % — SIGNIFICANT CHANGE UP (ref 0–6)
GLUCOSE SERPL-MCNC: 106 MG/DL — HIGH (ref 70–99)
HCT VFR BLD CALC: 23.6 % — LOW (ref 34.5–45)
HGB BLD-MCNC: 7.8 G/DL — LOW (ref 11.5–15.5)
IMM GRANULOCYTES NFR BLD AUTO: 0.2 % — SIGNIFICANT CHANGE UP (ref 0–1.5)
LYMPHOCYTES # BLD AUTO: 1.15 K/UL — SIGNIFICANT CHANGE UP (ref 1–3.3)
LYMPHOCYTES # BLD AUTO: 24.9 % — SIGNIFICANT CHANGE UP (ref 13–44)
MCHC RBC-ENTMCNC: 28.5 PG — SIGNIFICANT CHANGE UP (ref 27–34)
MCHC RBC-ENTMCNC: 33.1 GM/DL — SIGNIFICANT CHANGE UP (ref 32–36)
MCV RBC AUTO: 86.1 FL — SIGNIFICANT CHANGE UP (ref 80–100)
MONOCYTES # BLD AUTO: 0.4 K/UL — SIGNIFICANT CHANGE UP (ref 0–0.9)
MONOCYTES NFR BLD AUTO: 8.7 % — SIGNIFICANT CHANGE UP (ref 2–14)
NEUTROPHILS # BLD AUTO: 3 K/UL — SIGNIFICANT CHANGE UP (ref 1.8–7.4)
NEUTROPHILS NFR BLD AUTO: 65.1 % — SIGNIFICANT CHANGE UP (ref 43–77)
NRBC # BLD: 0 /100 WBCS — SIGNIFICANT CHANGE UP (ref 0–0)
PLATELET # BLD AUTO: 208 K/UL — SIGNIFICANT CHANGE UP (ref 150–400)
POTASSIUM SERPL-MCNC: 3.7 MMOL/L — SIGNIFICANT CHANGE UP (ref 3.5–5.3)
POTASSIUM SERPL-SCNC: 3.7 MMOL/L — SIGNIFICANT CHANGE UP (ref 3.5–5.3)
RBC # BLD: 2.74 M/UL — LOW (ref 3.8–5.2)
RBC # FLD: 19 % — HIGH (ref 10.3–14.5)
SODIUM SERPL-SCNC: 141 MMOL/L — SIGNIFICANT CHANGE UP (ref 135–145)
WBC # BLD: 4.61 K/UL — SIGNIFICANT CHANGE UP (ref 3.8–10.5)
WBC # FLD AUTO: 4.61 K/UL — SIGNIFICANT CHANGE UP (ref 3.8–10.5)

## 2018-08-23 PROCEDURE — 74176 CT ABD & PELVIS W/O CONTRAST: CPT | Mod: 26

## 2018-08-23 RX ORDER — IOHEXOL 300 MG/ML
30 INJECTION, SOLUTION INTRAVENOUS ONCE
Qty: 0 | Refills: 0 | Status: DISCONTINUED | OUTPATIENT
Start: 2018-08-23 | End: 2018-08-23

## 2018-08-23 RX ORDER — CHOLECALCIFEROL (VITAMIN D3) 125 MCG
1 CAPSULE ORAL
Qty: 0 | Refills: 0 | COMMUNITY

## 2018-08-23 RX ORDER — ASPIRIN/CALCIUM CARB/MAGNESIUM 324 MG
1 TABLET ORAL
Qty: 0 | Refills: 0 | COMMUNITY

## 2018-08-23 RX ORDER — LEVOTHYROXINE SODIUM 125 MCG
1 TABLET ORAL
Qty: 0 | Refills: 0 | COMMUNITY

## 2018-08-23 RX ORDER — LEVOTHYROXINE SODIUM 125 MCG
75 TABLET ORAL DAILY
Qty: 0 | Refills: 0 | Status: DISCONTINUED | OUTPATIENT
Start: 2018-08-23 | End: 2018-08-27

## 2018-08-23 RX ORDER — SUCRALFATE 1 G
1 TABLET ORAL
Qty: 0 | Refills: 0 | Status: DISCONTINUED | OUTPATIENT
Start: 2018-08-23 | End: 2018-08-27

## 2018-08-23 RX ORDER — RABEPRAZOLE 20 MG/1
1 TABLET, DELAYED RELEASE ORAL
Qty: 0 | Refills: 0 | COMMUNITY

## 2018-08-23 RX ORDER — PANTOPRAZOLE SODIUM 20 MG/1
40 TABLET, DELAYED RELEASE ORAL
Qty: 0 | Refills: 0 | Status: DISCONTINUED | OUTPATIENT
Start: 2018-08-23 | End: 2018-08-27

## 2018-08-23 RX ADMIN — Medication 1 GRAM(S): at 17:22

## 2018-08-23 RX ADMIN — PANTOPRAZOLE SODIUM 40 MILLIGRAM(S): 20 TABLET, DELAYED RELEASE ORAL at 17:22

## 2018-08-23 RX ADMIN — PANTOPRAZOLE SODIUM 40 MILLIGRAM(S): 20 TABLET, DELAYED RELEASE ORAL at 05:24

## 2018-08-23 RX ADMIN — Medication 75 MICROGRAM(S): at 17:22

## 2018-08-23 RX ADMIN — Medication 25 MILLIGRAM(S): at 05:24

## 2018-08-23 NOTE — DISCHARGE NOTE ADULT - CARE PLAN
Principal Discharge DX:	Anemia due to blood loss  Goal:	? source , on PPI ,  Assessment and plan of treatment:	small meals , hold aspirin for few days or weeks  Secondary Diagnosis:	Damian's esophagus without dysplasia  Assessment and plan of treatment:	ppi , small meals ,  Secondary Diagnosis:	Coronary artery disease without angina pectoris, unspecified vessel or lesion type, unspecified whether native or transplanted heart  Assessment and plan of treatment:	hx of falls and dementia , will hold anticoag due to bleeding and hold aspirin for  few days  Secondary Diagnosis:	Alzheimer's dementia with behavioral disturbance, unspecified timing of dementia onset  Assessment and plan of treatment:	monitor , try to keep peaceful and comfortable  Secondary Diagnosis:	Arthritis  Goal:	tyklenol  Secondary Diagnosis:	Hypothyroidism, unspecified type  Goal:	synthroid  Secondary Diagnosis:	Transient cerebral ischemia, unspecified type  Goal:	old , and dementia , will juan f aspirin in future low dose , and or plavix ,

## 2018-08-23 NOTE — DISCHARGE NOTE ADULT - SECONDARY DIAGNOSIS.
Damian's esophagus without dysplasia Coronary artery disease without angina pectoris, unspecified vessel or lesion type, unspecified whether native or transplanted heart Alzheimer's dementia with behavioral disturbance, unspecified timing of dementia onset Arthritis Hypothyroidism, unspecified type Transient cerebral ischemia, unspecified type

## 2018-08-23 NOTE — DISCHARGE NOTE ADULT - MEDICATION SUMMARY - MEDICATIONS TO STOP TAKING
I will STOP taking the medications listed below when I get home from the hospital:    aspirin 81 mg oral tablet  -- 1 tab(s) by mouth once a day    Cipro 250 mg oral tablet  -- 1 tab(s) by mouth once a day  -- Avoid prolonged or excessive exposure to direct and/or artificial sunlight while taking this medication.  Check with your doctor before becoming pregnant.  Do not take dairy products, antacids, or iron preparations within one hour of this medication.  Finish all this medication unless otherwise directed by prescriber.  Medication should be taken with plenty of water.    cephalexin 250 mg oral capsule  -- 1 cap(s) by mouth 4 times a day x 5 days    aspirin 325 mg oral tablet  -- 1 tab(s) by mouth once a day

## 2018-08-23 NOTE — DISCHARGE NOTE ADULT - HOSPITAL COURSE
Assessment and Plan:   · Assessment		  		  89F with PMH of advanced dementia, HTN, HLD, TIA, GERD, Barretts esophagus, CAD s/p (2 stents; 2002, 2012) admitted for sepsis 2/2 UTI, gram + bacteremia,  AMS likely  hospital acquired delirium and suspected metabolic encephalopathy secondary to pyelonephritis,  SARA, and now new onset Afib with RVR. Patient was on 1:1 observation for safety. And was seen by Neuro and Psych. Patient was started on IV antibiotics for UTI. Blood cultures were negative, one sample came back positive for alpha hemolytic Strep that was likely contaminant per ID. Patient had swallow evaluation and recommended dysphagia 2 diet. Antibiotics switched to oral. Mental status improved to baseline. Patient also found to have new onset atrial fibrillation with RVR that improved with metoprolol and was started on Lovenox here but patient not a candidate for long term anticoagulation secondary to advance dementia and fall risk and risk of bleeding but ok with aspirin 325mg po daily. Patient to go to [facility] and will f/u with cardiologist, PCP as out patient. Pt also has valvular insufficieny and dx and is at high risk of cva or tia ,   patient was in rehab and noted to be weak , nd pale , and had hematochezia and was transferred for low hg 7.1 and in er 6.9 and transfused , and admitted for GI bleed ,   case rev with dtr and agree with plan transfuse , and monitor ,   patient has multiple medical issues and is DNR and DNI ,   prognosis is poor ,   patient admitted with guiac positive stool and low hg and post 2 units bloods ,   dtr agrees that patient may not be able to tolerate any procedures ie egd or colonoscopy , will d/w GI , will get patient and out of bed eval , on clear liquids   patient also has mitral insufficiency and valvular dx , high risk for cva due to a fib and abnl valves   patient hg dropped again , will repeat in am , on PPI , and will have to consider starting Lovenox for dvt prophylax , dtr aware of risk of cva tia , and over all a poor candidate for any aggressive therapy ,   GI noted ,   also uti enterococcus ? colonization will monitor for now   UC also shows Morganella , and VRE   will do consider abx for ? uti , also keep here till tomorrow , assess hg and monitor , po intake is poor ,   d/w dtr ,      Problem/Plan - 1:  ·  Problem: Gastrointestinal hemorrhage, unspecified gastrointestinal hemorrhage type.  Plan: iv protonix, transfusion  consider gi eval.      Problem/Plan - 2:  ·  Problem: Coronary artery disease with angina pectoris, unspecified vessel or lesion type, unspecified whether native or transplanted heart.  Plan: monitor.      Problem/Plan - 3:  ·  Problem: Transient cerebral ischemia, unspecified type.  Plan: monitor ha a fib.      Problem/Plan - 4:  ·  Problem: Damian's esophagus without dysplasia.  Plan: iv ppi.      Problem/Plan - 5:  ·  Problem: Dementia.  Plan: progressive worsening.      Problem/Plan - 6:  Problem: Essential hypertension. Plan: metoprolol.     Problem/Plan - 7:  ·  Problem: Hypercholesterolemia.  Plan: monitor.      Problem/Plan - 8:  ·  Problem: Hypothyroidism, unspecified type.  Plan: synthroid.      Problem/Plan - 9:  ·  Problem: Anemia due to blood loss.  Plan: transfuse.     also had UTi will see if id will order abx ,

## 2018-08-23 NOTE — PROGRESS NOTE ADULT - PROBLEM SELECTOR PLAN 1
hgb trend noted, low but stable, no overt s/s gib  fobt +, to discuss with family extent of w/u, may need egd  daily cbc , transfuse prn   cont ppi, carafate  f/u iron studies   consider hematology eval  cta/bleeding scan if with active s/s gib  further w/u to be determined hgb trend noted, low but stable, no overt s/s gib  fobt +, to discuss with family extent of w/u, may need egd  check ct a/p with po con  daily cbc , transfuse prn   cont ppi, carafate  f/u iron studies   consider hematology eval  further w/u to be determined

## 2018-08-23 NOTE — DISCHARGE NOTE ADULT - MEDICATION SUMMARY - MEDICATIONS TO CHANGE
I will SWITCH the dose or number of times a day I take the medications listed below when I get home from the hospital:    Metoprolol Succinate ER 25 mg oral tablet, extended release  -- 0.5 tab(s) by mouth once a day MDD:1  -- It is very important that you take or use this exactly as directed.  Do not skip doses or discontinue unless directed by your doctor.  May cause drowsiness.  Alcohol may intensify this effect.  Use care when operating dangerous machinery.  Some non-prescription drugs may aggravate your condition.  Read all labels carefully.  If a warning appears, check with your doctor before taking.  Swallow whole.  Do not crush.  Take with food or milk.  This drug may impair the ability to drive or operate machinery.  Use care until you become familiar with its effects.    metoprolol tartrate 25 mg oral tablet  -- 1 tab(s) by mouth every 8 hours

## 2018-08-23 NOTE — DISCHARGE NOTE ADULT - CARE PROVIDER_API CALL
Jovan Escobedo), Internal Medicine  [address] [place] [address]  Phone: [phone]  Fax: [phone]

## 2018-08-23 NOTE — PROGRESS NOTE ADULT - ASSESSMENT
89F with PMH of advanced dementia, HTN, HLD, TIA, GERD, Barretts esophagus, CAD s/p (2 stents; 2002, 2012) admitted for sepsis 2/2 UTI, gram + bacteremia,  AMS likely  hospital acquired delirium and suspected metabolic encephalopathy secondary to pyelonephritis,  SARA, and now new onset Afib with RVR. Patient was on 1:1 observation for safety. And was seen by Neuro and Psych. Patient was started on IV antibiotics for UTI. Blood cultures were negative, one sample came back positive for alpha hemolytic Strep that was likely contaminant per ID. Patient had swallow evaluation and recommended dysphagia 2 diet. Antibiotics switched to oral. Mental status improved to baseline. Patient also found to have new onset atrial fibrillation with RVR that improved with metoprolol and was started on Lovenox here but patient not a candidate for long term anticoagulation secondary to advance dementia and fall risk and risk of bleeding but ok with aspirin 325mg po daily. Patient to go to [facility] and will f/u with cardiologist, PCP as out patient. Pt also has valvular insufficieny and dx and is at high risk of cva or tia ,   patient was in rehab and noted to be weak , nd pale , and had hematochezia and was transferred for low hg 7.1 and in er 6.9 and transfused , and admitted for GI bleed ,   case rev with dtr and agree with plan transfuse , and monitor ,   patient has multiple medical issues and is DNR and DNI ,   prognosis is poor ,   patient admitted with guiac positive stool and low hg and post 2 units bloods ,   dtr agrees that patient may not be able to tolerate any procedures ie egd or colonoscopy , will d/w GI , will get patient and out of bed eval , on clear liquids   patient also has mitral insufficiency and valvular dx , high risk for cva due to a fib and abnl valves   patient hg dropped again , will repeat in am , on PPI , and will have to consider starting Lovenox for dvt prophylax , dtr aware of risk of cva tia , and over all a poor candidate for any aggressive therapy ,   GI noted ,   also uti enterococcus ? colonization will monitor for now   UC also shows Morganella , and VRE   will do consider abx for ? uti , also keep here till tomorrow , assess hg and monitor , po intake is poor ,   d/w dtr ,

## 2018-08-23 NOTE — DISCHARGE NOTE ADULT - PATIENT PORTAL LINK FT
You can access the [facility], offered by [facility], by registering with the following website: [email or web address]

## 2018-08-23 NOTE — DISCHARGE NOTE ADULT - PLAN OF CARE
? source , on PPI , small meals , hold aspirin for few days or weeks ppi , small meals , hx of falls and dementia , will hold anticoag due to bleeding and hold aspirin for  few days monitor , try to keep peaceful and [facility] , and dementia , will juan f aspirin in future low dose , and or plavix ,

## 2018-08-23 NOTE — PROVIDER CONTACT NOTE (CRITICAL VALUE NOTIFICATION) - SITUATION
DNR/DNI PATIENT WITH POSITIVE  URINE CX DNR/DNI PATIENT WITH POSITIVE  URINE Cx as  per lab greater than 100,000 cfu/enterococcus foecium.  and between 50 to 99,000morganella morgani.

## 2018-08-23 NOTE — DISCHARGE NOTE ADULT - MEDICATION SUMMARY - MEDICATIONS TO TAKE
I will START or STAY ON the medications listed below when I get home from the hospital:    acetaminophen 325 mg oral tablet  -- 2 tab(s) by mouth every 8 hours, As Needed -pain/ back  -- Indication: For pain    Metoprolol Succinate ER 25 mg oral tablet, extended release  -- 0.5 tab(s) by mouth once a day MDD:1  -- It is very important that you take or use this exactly as directed.  Do not skip doses or discontinue unless directed by your doctor.  May cause drowsiness.  Alcohol may intensify this effect.  Use care when operating dangerous machinery.  Some non-prescription drugs may aggravate your condition.  Read all labels carefully.  If a warning appears, check with your doctor before taking.  Swallow whole.  Do not crush.  Take with food or milk.  This drug may impair the ability to drive or operate machinery.  Use care until you become familiar with its effects.    -- Indication: For Bp    RABEprazole 20 mg oral tablet, extended release  -- 2 tab(s) by mouth once a day  -- Indication: For Gastritis    levothyroxine  -- 75 milligram(s) by mouth once a day  -- Indication: For Hypothyroidism, unspecified type    Vitamin B12  -- Indication: For vitamin I will START or STAY ON the medications listed below when I get home from the hospital:    acetaminophen 325 mg oral tablet  -- 2 tab(s) by mouth every 8 hours, As Needed -pain/ back  -- Indication: For pain    Metoprolol Succinate ER 25 mg oral tablet, extended release  -- 0.5 tab(s) by mouth once a day MDD:1  -- It is very important that you take or use this exactly as directed.  Do not skip doses or discontinue unless directed by your doctor.  May cause drowsiness.  Alcohol may intensify this effect.  Use care when operating dangerous machinery.  Some non-prescription drugs may aggravate your condition.  Read all labels carefully.  If a warning appears, check with your doctor before taking.  Swallow whole.  Do not crush.  Take with food or milk.  This drug may impair the ability to drive or operate machinery.  Use care until you become familiar with its effects.    -- Indication: For Bp    ferrous sulfate 300 mg/5 mL (60 mg elemental iron) oral liquid  -- 5 milliliter(s) by mouth 2 times a day (with meals)  -- Indication: For Anemia due to blood loss    docusate sodium 100 mg oral capsule  -- 1 cap(s) by mouth 3 times a day  -- Indication: For Constipation    RABEprazole 20 mg oral tablet, extended release  -- 2 tab(s) by mouth once a day  -- Indication: For Gastritis    levothyroxine  -- 75 milligram(s) by mouth once a day  -- Indication: For Hypothyroidism, unspecified type    Multiple Vitamins with Minerals oral tablet  -- 1 tab(s) by mouth once a day  -- Indication: For vitamin    Vitamin B12  -- Indication: For vitamin

## 2018-08-23 NOTE — PROGRESS NOTE ADULT - SUBJECTIVE AND OBJECTIVE BOX
PCP  Subjective:   in bed , awake , but sleepy , denies pain , does not eat much , weak , but comfortable ,   ct was ordered but changed to iv only , as patient is not taking in the po contrast ,   her H/H is stable     Objective:   Vital Signs Last 24 Hrs  T(C): 36.3 (18 @ 05:02), Max: 36.4 (18 @ 13:59)  T(F): 97.4 (18 @ 05:02), Max: 97.5 (18 @ 13:59)  HR: 67 (18 @ 05:02) (67 - 75)  BP: 117/60 (18 @ 05:02) (117/60 - 139/54)  BP(mean): --  RR: 16 (18 @ 05:02) (15 - 16)  SpO2: 95% (18 @ 05:02) (93% - 96%)  Daily     Daily Weight in k.5 (22 Aug 2018 10:03)    GENERAL:  wdwn female , sleeping , arousable , confused , weak ,   EYES: eomi  NECK: supple , but general stiffness  CHEST/LUNG: clear anteriorly   HEART: s1 s2 irregularly irregular 2/6 justine   ABDOMEN:  soft NT + bs  EXTREMITIES:  no edema   SKIN:  warm , dry , some puffiness  CNS:  awake, , lethargic , weak , poor balance , general stiffness , confused   weak , poor balance     Allergies: Allergies    No Known Allergies    Intolerances        Home Medications:  acetaminophen 325 mg oral tablet: 2 tab(s) orally every 8 hours, As Needed -pain/ back (26 Oct 2017 09:37)  aspirin 325 mg oral tablet: 1 tab(s) orally once a day (16 Aug 2018 07:26)  aspirin 81 mg oral tablet: 1 tab(s) orally once a day (26 Oct 2017 09:37)  cephalexin 250 mg oral capsule: 1 cap(s) orally 4 times a day x 5 days (16 Aug 2018 07:26)  levothyroxine: 75 milligram(s) orally once a day (26 Oct 2017 09:37)  metoprolol tartrate 25 mg oral tablet: 1 tab(s) orally every 8 hours (16 Aug 2018 07:26)  RABEprazole 20 mg oral tablet, extended release: 2 tab(s) orally once a day (26 Oct 2017 09:37)  RABEprazole 20 mg oral tablet, extended release: 1 cap(s) orally 3 times a week (16 Aug 2018 07:26)  Synthroid 75 mcg (0.075 mg) oral tablet: 1 tab(s) orally once a day (16 Aug 2018 07:26)  Vitamin B12:  (16 Aug 2018 07:26)  Vitamin D3 1000 intl units oral tablet: 1 tab(s) orally once a day (26 Oct 2017 09:37)  Vitamin D3 1000 intl units oral tablet: 1 tab(s) orally once a day (16 Aug 2018 07:26)    Medications:   acetaminophen   Tablet. 650 milliGRAM(s) Oral every 6 hours PRN  levothyroxine Injectable 25 MICROGram(s) IV Push at bedtime  metoprolol succinate ER 25 milliGRAM(s) Oral daily  pantoprazole  Injectable 40 milliGRAM(s) IV Push two times a day  sucralfate suspension 1 Gram(s) Oral two times a day      LABS:                        7.8    4.61  )-----------( 208      ( 23 Aug 2018 05:34 )             23.6     08-    141  |  111<H>  |  33<H>  ----------------------------<  106<H>  3.7   |  21<L>  |  1.10    Ca    7.6<L>      23 Aug 2018 05:34        08-20 @ 15:25  INR 1.14        CAPILLARY BLOOD GLUCOSE              RECENT CULTURES:  Culture Results:   No growth to date. ( @ 19:03)  Culture Results:   No growth to date. ( @ 19:03)  Culture Results:   >100,000 CFU/ml Enterococcus faecium (vancomycin resistant)  50,000 - 99,000 CFU/mL Morganella morganii ( 18:37)        Culture - Blood (collected 18 @ 19:03)  Source: .Blood Blood  Preliminary Report (18 @ 20:01):    No growth to date.    Culture - Blood (collected 18 @ 19:03)  Source: .Blood Blood  Preliminary Report (18 @ 20:01):    No growth to date.    Culture - Urine (collected 18 @ 18:37)  Source: .Urine Clean Catch (Midstream)  Final Report (18 @ 21:25):    >100,000 CFU/ml Enterococcus faecium (vancomycin resistant)    50,000 - 99,000 CFU/mL Morganella morganii  Organism: Enterococcus faecium (vancomycin resistant)  Morganella morganii (18 @ 21:25)  Organism: Morganella morganii (18 @ 21:25)      -  Amikacin: S <=8      -  Amoxicillin/Clavulanic Acid: R >16/8      -  Ampicillin: R >16 These ampicillin results predict results for amoxicillin      -  Ampicillin/Sulbactam: R >16/8      -  Aztreonam: R >16      -  Cefazolin: R >16      -  Cefepime: I 16      -  Cefoxitin: R >16      -  Ceftriaxone: R 32 Enterobacter, Citrobacter, and Serratia may develop resistance during prolonged therapy      -  Ciprofloxacin: R >2      -  Ertapenem: S <=0.5      -  Gentamicin: R >8      -  Imipenem: I 2      -  Levofloxacin: R >4      -  Meropenem: S <=1      -  Nitrofurantoin: R >64 Should not be used to treat pyelonephritis      -  Piperacillin/Tazobactam: R >64      -  Tigecycline: R <=1      -  Tobramycin: I 8      -  Trimethoprim/Sulfamethoxazole: R >2/38      Method Type: LEVI  Organism: Enterococcus faecium (vancomycin resistant) (18 @ 21:25)      -  Ampicillin: R >8 Predicts results to ampicillin/sulbactam, amoxacillin-clavulanate and  piperacillin-tazobactam.      -  Ciprofloxacin: R >2      -  Daptomycin: S 4      -  Levofloxacin: R >4      -  Linezolid: S 2      -  Nitrofurantoin: S <=32 Should not be used to treat pyelonephritis.      -  Tetra/Doxy: R >8      -  Vancomycin: R >16      Method Type: LEVI

## 2018-08-23 NOTE — PROGRESS NOTE ADULT - SUBJECTIVE AND OBJECTIVE BOX
INTERVAL HPI/OVERNIGHT EVENTS:  pt seen and examined  lethargic in bed but arousable, denies abd pain, +bm yesterday  per overnight rn no s/s overt gib  afebrile overnight labs noted    MEDICATIONS  (STANDING):  levothyroxine Injectable 25 MICROGram(s) IV Push at bedtime  metoprolol succinate ER 25 milliGRAM(s) Oral daily  pantoprazole  Injectable 40 milliGRAM(s) IV Push two times a day    MEDICATIONS  (PRN):  acetaminophen   Tablet. 650 milliGRAM(s) Oral every 6 hours PRN Mild Pain (1 - 3)      Allergies    No Known Allergies    Intolerances        Review of Systems:    unable to obtain in entirety    Vital Signs Last 24 Hrs  T(C): 36.3 (23 Aug 2018 05:02), Max: 36.4 (22 Aug 2018 13:59)  T(F): 97.4 (23 Aug 2018 05:02), Max: 97.5 (22 Aug 2018 13:59)  HR: 67 (23 Aug 2018 05:02) (67 - 75)  BP: 117/60 (23 Aug 2018 05:02) (117/60 - 139/54)  BP(mean): --  RR: 16 (23 Aug 2018 05:02) (15 - 16)  SpO2: 95% (23 Aug 2018 05:02) (93% - 96%)    PHYSICAL EXAM:    Constitutional: NAD, lying in bed  HEENT: EOMI, perrl  Neck: No LAD  Respiratory: dec bs  Cardiovascular: S1 and S2, RRR  Gastrointestinal: BS+, soft, NT, mild DT  Extremities: No peripheral edema  Vascular: 2+ peripheral pulses  Neurological: Awake alert some confusion  Skin: No rashes      LABS:                        7.8    4.61  )-----------( 208      ( 23 Aug 2018 05:34 )             23.6     08-23    141  |  111<H>  |  33<H>  ----------------------------<  106<H>  3.7   |  21<L>  |  1.10    Ca    7.6<L>      23 Aug 2018 05:34            RADIOLOGY & ADDITIONAL TESTS:

## 2018-08-23 NOTE — CONSULT NOTE ADULT - SUBJECTIVE AND OBJECTIVE BOX
[facility], Division of Infectious Diseases  NATHAN Goodrich A. Lee  [phone]  NICOLEJOHN  89y, Female  [id number]  HPI:   89F with PMH of advanced dementia, HTN, HLD, TIA, GERD, Barretts esophagus, Carotid artery stenosis admitted from rehab with weakness, acute blood loss anemia.  [facility] recently [facility] with dx of pyelonephritis klebsiella and treated completed keflex orall.  On that admission had blood cx + which was a contaminant.  Patient also found to have new onset atrial fibrillation with RVR that improved with metoprolol and was started on Lovenox here but patient not a candidate for long term anticoagulation secondary to advance dementia and fall risk and risk of bleeding but ok with aspirin 325mg po daily.  Currently pt alert, follows some simple commands, not all.  Oriented time 2,  does not answer all questions.      PMH/PSH--  CAD (coronary artery disease)  TIA (transient ischemic attack)  GERD (gastroesophageal reflux disease)  Damian esophagus  Arthritis  Hypertension  High cholesterol  Dementia  Hypothyroid  Low sodium levels  TIA (transient ischemic attack)  Arthritis  Hypercholesterolemia  HTN - Hypertension  GERD (gastroesophageal reflux disease)  Damian esophagus  Cataract  S/P cholecystectomy  Cataract  S/P laparoscopic cholecystectomy  H/O carotid endarterectomy  H/O:       Allergies--NKDA      Medications--  Antibiotics:   Immunologic:   Other: acetaminophen   Tablet. PRN  levothyroxine  metoprolol succinate ER  pantoprazole    Tablet  sucralfate suspension      Social History--  unable to obtain    Family/Marital History--  NC  Remainder not relevant to clinical concern.    Travel/Environmental/Occupational History:  NC    Review of Systems:  A >=10-point review of systems was obtained.     unable to obtain    Review of systems otherwise negative except as previously noted.    Physical Exam--  Vital Signs: T(F): 97.4 (18 @ 05:02), Max: 97.5 (18 @ 13:59)  HR: 67 (18 @ 05:02)  BP: 117/60 (18 @ 05:02)  RR: 16 (18 @ 05:02)  SpO2: 95% (18 @ 05:02)  Wt(kg): --  General: Nontoxic-appearing Female in no acute distress.  HEENT: AT/NC Oropharynx clear. Dentition fair.  Neck: Not rigid. No sense of mass.  Nodes: None palpable.  Lungs: clear anteriorly  Heart: Regular rate and rhythm. + murmur  Abdomen: Bowel sounds present and normoactive. Soft. Nondistended.  Extremities: No cyanosis or clubbing. trace edema.   Skin: Warm. Dry. Good turgor. No rash. No vasculitic stigmata.  Psychiatric: confused,  calm        Laboratory & Imaging Data--  CBC                        7.8    4.61  )-----------( 208      ( 23 Aug 2018 05:34 )             23.6       Chemistries      141  |  111<H>  |  33<H>  ----------------------------<  106<H>  3.7   |  21<L>  |  1.10    Ca    7.6<L>      23 Aug 2018 05:34        Culture Data    Culture - Blood (collected 20 Aug 2018 19:03)  Source: .Blood Blood  Preliminary Report (21 Aug 2018 20:01):    No growth to date.    Culture - Blood (collected 20 Aug 2018 19:03)  Source: .Blood Blood  Preliminary Report (21 Aug 2018 20:01):    No growth to date.    Culture - Urine (collected 20 Aug 2018 18:37)  Source: .Urine Clean Catch (Midstream)  Final Report (22 Aug 2018 21:25):    >100,000 CFU/ml Enterococcus faecium (vancomycin resistant)    50,000 - 99,000 CFU/mL Morganella morganii  Organism: Enterococcus faecium (vancomycin resistant)  Morganella morganii (22 Aug 2018 21:25)  Organism: Morganella morganii (22 Aug 2018 21:25)  Organism: Enterococcus faecium (vancomycin resistant) (22 Aug 2018 21:25)      Urinalysis (18 @ 15:28)    Glucose Qualitative, Urine: Negative    Blood, Urine: Large    pH Urine: 5.0    Color: Yellow    Urine Appearance: Turbid    Bilirubin: Negative    Ketone - Urine: Negative    Specific Gravity: 1.005    Protein, Urine: 25 mg/dL    Urobilinogen: Negative    Nitrite: Negative    Leukocyte Esterase Concentration: Moderate        < from: CT Abdomen and Pelvis No Cont (18 @ 08:50) >    EXAM:  CT ABDOMEN AND PELVIS                            PROCEDURE DATE:  2018          INTERPRETATION:  Clinical information: GI bleeding    Comparison: None    PROCEDURE:     CT of the Abdomen and Pelvis was performed without intravenous contrast.    Evaluation of abdominal and pelvic viscera, lymph nodes and vasculature   is limited without intravenous contrast.    FINDINGS:    LOWER CHEST: Small bilateral pleural effusions. Bibasilar   atelectasis/pneumonia and endobronchial mucous. Moderate hiatal hernia   with distal esophageal fluid and 3. Correlate for aspiration.    ABDOMEN:  LIVER: within normal limits  BILE DUCTS: normal caliber  GALLBLADDER: no calcified gallstones. normal caliber wall  PANCREAS: within normal limits  SPLEEN: within normal limits  ADRENALS: within normal limits  KIDNEYS: within normal limits    PELVIS:  REPRODUCTIVE ORGANS: no pelvic masses  BLADDER: within normal limits    BOWEL: Sigmoid diverticulosis. No bowel obstruction.  PERITONEUM: no ascites or free air, no fluid collection  RETROPERITONEUM: no enlarged retroperitoneal or pelvic nodes.    VESSELS: Extensively atherosclerotic. Flattened inferior vena cava   suggesting volume depletion.  ABDOMINAL WALL: within normal limits.  MUSCULOSKELETAL: within normal limits.    IMPRESSION:     Sigmoid diverticulosis. Evaluation for active GI bleeding isn't possible   without contrast/CT angiogram.            < end of copied text >      < from: CT Head No Cont (18 @ 15:58) >    EXAM:  CT BRAIN                            PROCEDURE DATE:  2018          INTERPRETATION:  CLINICAL STATEMENT: Confusion    TECHNIQUE: CT of the head was performed without IV contrast.    COMPARISON: 8/10/2018    FINDINGS:    There is diffuseparenchymal volume loss. There are areas of low   attenuation in the periventricular white matter likely related to chronic   microvascular ischemic changes.    There is no acute parenchymal hemorrhage, parenchymal mass, mass effect   or midline shift. There is no extra-axial fluid collection. There is no   acute territorial infarct.     The ventricles are mildly prominent likely secondary to central atrophy.   There is intracranial atherosclerosis.    The calvarium is intact. The visualized paranasal sinuses are well   aerated. Poorly pneumatized right mastoid again noted. The visualized   orbits are unremarkable. The visualized right zygoma demonstrates again   evidence of old fracture. Hardware is partially identified within the   anteriorright maxillary sinus.    IMPRESSION:    No acute intracranial hemorrhage or acute territorial infarct.   No change.      < end of copied text >      < from: Xray Chest 1 View AP/PA (18 @ 15:17) >    EXAM:  XR CHEST AP OR PA 1V                            PROCEDURE DATE:  2018          INTERPRETATION:  Clinical information: GI bleed.    Technique: Frontal view of the chest.    Comparison: Prior chest x-ray examination from 8/10/2018.    Findings: Trace bilateral pleural effusions are notable. The heart size   is mildly enlarged. There are mild multilevel degenerative changes of the   thoracic spine. There is an old right distal clavicular fracture with   osseous callus formation.    IMPRESSION: Trace bilateral pleural effusions.    < end of copied text >

## 2018-08-24 LAB
ANION GAP SERPL CALC-SCNC: 7 MMOL/L — SIGNIFICANT CHANGE UP (ref 5–17)
BASOPHILS # BLD AUTO: 0.01 K/UL — SIGNIFICANT CHANGE UP (ref 0–0.2)
BASOPHILS NFR BLD AUTO: 0.2 % — SIGNIFICANT CHANGE UP (ref 0–2)
BUN SERPL-MCNC: 29 MG/DL — HIGH (ref 7–23)
CALCIUM SERPL-MCNC: 7.7 MG/DL — LOW (ref 8.5–10.1)
CHLORIDE SERPL-SCNC: 111 MMOL/L — HIGH (ref 96–108)
CO2 SERPL-SCNC: 24 MMOL/L — SIGNIFICANT CHANGE UP (ref 22–31)
CREAT SERPL-MCNC: 1.1 MG/DL — SIGNIFICANT CHANGE UP (ref 0.5–1.3)
EOSINOPHIL # BLD AUTO: 0.02 K/UL — SIGNIFICANT CHANGE UP (ref 0–0.5)
EOSINOPHIL NFR BLD AUTO: 0.4 % — SIGNIFICANT CHANGE UP (ref 0–6)
FERRITIN SERPL-MCNC: 85 NG/ML — SIGNIFICANT CHANGE UP (ref 15–150)
GLUCOSE SERPL-MCNC: 83 MG/DL — SIGNIFICANT CHANGE UP (ref 70–99)
HCT VFR BLD CALC: 24.6 % — LOW (ref 34.5–45)
HGB BLD-MCNC: 7.7 G/DL — LOW (ref 11.5–15.5)
IMM GRANULOCYTES NFR BLD AUTO: 0.2 % — SIGNIFICANT CHANGE UP (ref 0–1.5)
IRON SATN MFR SERPL: 21 UG/DL — LOW (ref 30–160)
IRON SATN MFR SERPL: 9 % — LOW (ref 14–50)
LYMPHOCYTES # BLD AUTO: 1.57 K/UL — SIGNIFICANT CHANGE UP (ref 1–3.3)
LYMPHOCYTES # BLD AUTO: 35 % — SIGNIFICANT CHANGE UP (ref 13–44)
MCHC RBC-ENTMCNC: 27.8 PG — SIGNIFICANT CHANGE UP (ref 27–34)
MCHC RBC-ENTMCNC: 31.3 GM/DL — LOW (ref 32–36)
MCV RBC AUTO: 88.8 FL — SIGNIFICANT CHANGE UP (ref 80–100)
MONOCYTES # BLD AUTO: 0.4 K/UL — SIGNIFICANT CHANGE UP (ref 0–0.9)
MONOCYTES NFR BLD AUTO: 8.9 % — SIGNIFICANT CHANGE UP (ref 2–14)
NEUTROPHILS # BLD AUTO: 2.47 K/UL — SIGNIFICANT CHANGE UP (ref 1.8–7.4)
NEUTROPHILS NFR BLD AUTO: 55.3 % — SIGNIFICANT CHANGE UP (ref 43–77)
NRBC # BLD: 0 /100 WBCS — SIGNIFICANT CHANGE UP (ref 0–0)
PLATELET # BLD AUTO: 196 K/UL — SIGNIFICANT CHANGE UP (ref 150–400)
POTASSIUM SERPL-MCNC: 4.5 MMOL/L — SIGNIFICANT CHANGE UP (ref 3.5–5.3)
POTASSIUM SERPL-SCNC: 4.5 MMOL/L — SIGNIFICANT CHANGE UP (ref 3.5–5.3)
RBC # BLD: 2.77 M/UL — LOW (ref 3.8–5.2)
RBC # FLD: 19.9 % — HIGH (ref 10.3–14.5)
SODIUM SERPL-SCNC: 142 MMOL/L — SIGNIFICANT CHANGE UP (ref 135–145)
TIBC SERPL-MCNC: 227 UG/DL — SIGNIFICANT CHANGE UP (ref 220–430)
UIBC SERPL-MCNC: 206 UG/DL — SIGNIFICANT CHANGE UP (ref 110–370)
WBC # BLD: 4.48 K/UL — SIGNIFICANT CHANGE UP (ref 3.8–10.5)
WBC # FLD AUTO: 4.48 K/UL — SIGNIFICANT CHANGE UP (ref 3.8–10.5)

## 2018-08-24 RX ORDER — DOCUSATE SODIUM 100 MG
100 CAPSULE ORAL THREE TIMES A DAY
Qty: 0 | Refills: 0 | Status: DISCONTINUED | OUTPATIENT
Start: 2018-08-24 | End: 2018-08-27

## 2018-08-24 RX ORDER — IRON SUCROSE 20 MG/ML
200 INJECTION, SOLUTION INTRAVENOUS ONCE
Qty: 0 | Refills: 0 | Status: COMPLETED | OUTPATIENT
Start: 2018-08-24 | End: 2018-08-24

## 2018-08-24 RX ORDER — SENNA PLUS 8.6 MG/1
2 TABLET ORAL AT BEDTIME
Qty: 0 | Refills: 0 | Status: DISCONTINUED | OUTPATIENT
Start: 2018-08-24 | End: 2018-08-27

## 2018-08-24 RX ADMIN — PANTOPRAZOLE SODIUM 40 MILLIGRAM(S): 20 TABLET, DELAYED RELEASE ORAL at 05:31

## 2018-08-24 RX ADMIN — Medication 75 MICROGRAM(S): at 05:31

## 2018-08-24 RX ADMIN — Medication 1 GRAM(S): at 17:43

## 2018-08-24 RX ADMIN — PANTOPRAZOLE SODIUM 40 MILLIGRAM(S): 20 TABLET, DELAYED RELEASE ORAL at 17:43

## 2018-08-24 RX ADMIN — IRON SUCROSE 110 MILLIGRAM(S): 20 INJECTION, SOLUTION INTRAVENOUS at 16:26

## 2018-08-24 RX ADMIN — Medication 1 GRAM(S): at 05:31

## 2018-08-24 RX ADMIN — Medication 25 MILLIGRAM(S): at 05:31

## 2018-08-24 NOTE — PROGRESS NOTE ADULT - SUBJECTIVE AND OBJECTIVE BOX
PCP  Subjective:   in bed , arousable , calm , sleeps alot , pulled iv line again , ct abd noted diverticulosis ,     Objective:   Vital Signs Last 24 Hrs  T(C): 36.6 (08-24-18 @ 04:34), Max: 36.6 (08-23-18 @ 14:19)  T(F): 97.8 (08-24-18 @ 04:34), Max: 97.8 (08-23-18 @ 14:19)  HR: 76 (08-24-18 @ 04:34) (73 - 76)  BP: 112/58 (08-24-18 @ 04:34) (112/58 - 128/71)  BP(mean): --  RR: 16 (08-24-18 @ 04:34) (16 - 16)  SpO2: 95% (08-24-18 @ 04:34) (93% - 96%)  Daily     Daily       GENERAL:  wdwn female , sleeping , arousable , confused , weak ,   EYES: eomi  NECK: supple , but general stiffness  CHEST/LUNG: clear anteriorly   HEART: s1 s2 irregularly irregular 2/6 justine   ABDOMEN:  soft NT + bs  EXTREMITIES:  no edema   SKIN:  warm , dry , some puffiness  CNS:  awake, , lethargic , weak , poor balance , general stiffness , confused   weak , poor balance       Allergies: Allergies    No Known Allergies    Intolerances        Home Medications:  acetaminophen 325 mg oral tablet: 2 tab(s) orally every 8 hours, As Needed -pain/ back (26 Oct 2017 09:37)  levothyroxine: 75 milligram(s) orally once a day (26 Oct 2017 09:37)  RABEprazole 20 mg oral tablet, extended release: 2 tab(s) orally once a day (26 Oct 2017 09:37)  Vitamin B12:  (16 Aug 2018 07:26)    Medications:   acetaminophen   Tablet. 650 milliGRAM(s) Oral every 6 hours PRN  levothyroxine 75 MICROGram(s) Oral daily  metoprolol succinate ER 25 milliGRAM(s) Oral daily  pantoprazole    Tablet 40 milliGRAM(s) Oral two times a day  sucralfate suspension 1 Gram(s) Oral two times a day      LABS:                        7.8    4.61  )-----------( 208      ( 23 Aug 2018 05:34 )             23.6     08-23    141  |  111<H>  |  33<H>  ----------------------------<  106<H>  3.7   |  21<L>  |  1.10    Ca    7.6<L>      23 Aug 2018 05:34        08-20 @ 15:25  INR 1.14        CAPILLARY BLOOD GLUCOSE              RECENT CULTURES:  Culture Results:   No growth to date. (08-20 @ 19:03)  Culture Results:   No growth to date. (08-20 @ 19:03)  Culture Results:   >100,000 CFU/ml Enterococcus faecium (vancomycin resistant)  50,000 - 99,000 CFU/mL Morganella morganii (08-20 @ 18:37)        Culture - Blood (collected 08-20-18 @ 19:03)  Source: .Blood Blood  Preliminary Report (08-21-18 @ 20:01):    No growth to date.    Culture - Blood (collected 08-20-18 @ 19:03)  Source: .Blood Blood  Preliminary Report (08-21-18 @ 20:01):    No growth to date.    Culture - Urine (collected 08-20-18 @ 18:37)  Source: .Urine Clean Catch (Midstream)  Final Report (08-22-18 @ 21:25):    >100,000 CFU/ml Enterococcus faecium (vancomycin resistant)    50,000 - 99,000 CFU/mL Morganella morganii  Organism: Enterococcus faecium (vancomycin resistant)  Morganella morganii (08-22-18 @ 21:25)  Organism: Morganella morganii (08-22-18 @ 21:25)      -  Amikacin: S <=8      -  Amoxicillin/Clavulanic Acid: R >16/8      -  Ampicillin: R >16 These ampicillin results predict results for amoxicillin      -  Ampicillin/Sulbactam: R >16/8      -  Aztreonam: R >16      -  Cefazolin: R >16      -  Cefepime: I 16      -  Cefoxitin: R >16      -  Ceftriaxone: R 32 Enterobacter, Citrobacter, and Serratia may develop resistance during prolonged therapy      -  Ciprofloxacin: R >2      -  Ertapenem: S <=0.5      -  Gentamicin: R >8      -  Imipenem: I 2      -  Levofloxacin: R >4      -  Meropenem: S <=1      -  Nitrofurantoin: R >64 Should not be used to treat pyelonephritis      -  Piperacillin/Tazobactam: R >64      -  Tigecycline: R <=1      -  Tobramycin: I 8      -  Trimethoprim/Sulfamethoxazole: R >2/38      Method Type: LEVI  Organism: Enterococcus faecium (vancomycin resistant) (08-22-18 @ 21:25)      -  Ampicillin: R >8 Predicts results to ampicillin/sulbactam, amoxacillin-clavulanate and  piperacillin-tazobactam.      -  Ciprofloxacin: R >2      -  Daptomycin: S 4      -  Levofloxacin: R >4      -  Linezolid: S 2      -  Nitrofurantoin: S <=32 Should not be used to treat pyelonephritis.      -  Tetra/Doxy: R >8      -  Vancomycin: R >16      Method Type: LEVI

## 2018-08-24 NOTE — PROGRESS NOTE ADULT - PROBLEM SELECTOR PLAN 1
Unable to ascertain symptoms but no fever, no leukocytosism admitted for unrelated reason.   Ua and cx abnormal but in this setting suspect asymptomatic bacteruria >> infection  Would defer antibiotics  Vigilance for clinical evidence of infection

## 2018-08-24 NOTE — PROGRESS NOTE ADULT - ATTENDING COMMENTS
Thank you for the courtesy of this referral.    Please recall as needed.    I'll sign off at this time.     Johnnie Trujillo MD  [phone]
Advanced care planning was discussed with patient and family.  Advanced care planning forms were reviewed and discussed.  Risks, benefits and alternatives of gastroenterologic procedures were discussed in detail and all questions were answered.    30 minutes spent.

## 2018-08-24 NOTE — PROGRESS NOTE ADULT - PROBLEM SELECTOR PLAN 1
am labs pending  hgb trend noted, low but stable, no overt s/s gib  fobt +  ct a/p showed sigmoid diverticulosis, no rp collection  to discuss further plan of care with family  daily cbc, transfuse prn   cont ppi, carafate  f/u iron studies   further w/u pending above

## 2018-08-24 NOTE — PROGRESS NOTE ADULT - SUBJECTIVE AND OBJECTIVE BOX
[facility], Division of Infectious Diseases  NATHAN Goodrich A. Lee  [phone]    Name: JOHN RIVERA  Age: 89y  Gender: Female  MRN: [id number]    Interval History--  Notes reviewed. Minimally verbal and minimally interactive. Says "No" when asked if in any pain, or other questions. Does not make eye contact.    Past Medical History--  CAD (coronary artery disease)  TIA (transient ischemic attack)  GERD (gastroesophageal reflux disease)  Damian esophagus  Arthritis  Hypertension  High cholesterol  Dementia  Hypothyroid  Low sodium levels  TIA (transient ischemic attack)  Arthritis  Hypercholesterolemia  HTN - Hypertension  GERD (gastroesophageal reflux disease)  Damian esophagus  Cataract  S/P cholecystectomy  Cataract  S/P laparoscopic cholecystectomy  H/O carotid endarterectomy  H/O:       For details regarding the patient's social history, family history, and other miscellaneous elements, please refer the initial infectious diseases consultation and/or the admitting history and physical examination for this admission.    Allergies    No Known Allergies    Intolerances        Medications--  Antibiotics:    Immunologic:    Other:  acetaminophen   Tablet. PRN  levothyroxine  metoprolol succinate ER  pantoprazole    Tablet  sucralfate suspension      Review of Systems--  Review of systems unable due to dementia.     Physical Examination--  Vital Signs: T(F): 97.8 (18 @ 04:34), Max: 97.8 (18 @ 14:19)  HR: 76 (18 @ 04:34)  BP: 112/58 (18 @ 04:34)  RR: 16 (18 @ 04:34)  SpO2: 95% (18 @ 04:34)  Wt(kg): --  General: Frail nontoxic-appearing Female in no acute distress.  HEENT: AT/NC. Anicteric. Conjunctiva pink and moist. Oropharynx/dentition unable secondary to patient compliance.   Neck: Not rigid. No sense of mass.  Nodes: None palpable.  Lungs: Poor effort grossly clear bilaterally without rales, wheezing or rhonchi  Heart: Regular rate and rhythm. 2/6 systolic Murmur. No rub. No gallop. No palpable thrill.  Abdomen: Bowel sounds present and normoactive. Soft. Nondistended. Nontender. No sense of mass. No organomegaly.  Extremities: No cyanosis or clubbing. No edema.   Skin: Warm. Dry. Good turgor. No rash. No vasculitic stigmata.  Psychiatric: Unable        Laboratory Studies--  CBC                        7.8    4.61  )-----------( 208      ( 23 Aug 2018 05:34 )             23.6       Chemistries  -    141  |  111<H>  |  33<H>  ----------------------------<  106<H>  3.7   |  21<L>  |  1.10    Ca    7.6<L>      23 Aug 2018 05:34        Culture Data    Culture - Blood (collected 20 Aug 2018 19:03)  Source: .Blood Blood  Preliminary Report (21 Aug 2018 20:01):    No growth to date.    Culture - Blood (collected 20 Aug 2018 19:03)  Source: .Blood Blood  Preliminary Report (21 Aug 2018 20:01):    No growth to date.    Culture - Urine (collected 20 Aug 2018 18:37)  Source: .Urine Clean Catch (Midstream)  Final Report (22 Aug 2018 21:25):    >100,000 CFU/ml Enterococcus faecium (vancomycin resistant)    50,000 - 99,000 CFU/mL Morganella morganii  Organism: Enterococcus faecium (vancomycin resistant)  Morganella morganii (22 Aug 2018 21:25)  Organism: Morganella morganii (22 Aug 2018 21:25)  Organism: Enterococcus faecium (vancomycin resistant) (22 Aug 2018 21:25)

## 2018-08-24 NOTE — PROGRESS NOTE ADULT - ASSESSMENT
89F with PMH of advanced dementia, HTN, HLD, TIA, GERD, Barretts esophagus, CAD s/p (2 stents; 2002, 2012) admitted for sepsis 2/2 UTI, gram + bacteremia,  AMS likely  hospital acquired delirium and suspected metabolic encephalopathy secondary to pyelonephritis,  SARA, and now new onset Afib with RVR. Patient was on 1:1 observation for safety. And was seen by Neuro and Psych. Patient was started on IV antibiotics for UTI. Blood cultures were negative, one sample came back positive for alpha hemolytic Strep that was likely contaminant per ID. Patient had swallow evaluation and recommended dysphagia 2 diet. Antibiotics switched to oral. Mental status improved to baseline. Patient also found to have new onset atrial fibrillation with RVR that improved with metoprolol and was started on Lovenox here but patient not a candidate for long term anticoagulation secondary to advance dementia and fall risk and risk of bleeding but ok with aspirin 325mg po daily. Patient to go to [facility] and will f/u with cardiologist, PCP as out patient. Pt also has valvular insufficieny and dx and is at high risk of cva or tia ,   patient was in rehab and noted to be weak , nd pale , and had hematochezia and was transferred for low hg 7.1 and in er 6.9 and transfused , and admitted for GI bleed ,   case rev with dtr and agree with plan transfuse , and monitor ,   patient has multiple medical issues and is DNR and DNI ,   prognosis is poor ,   patient admitted with guiac positive stool and low hg and post 2 units bloods ,   dtr agrees that patient may not be able to tolerate any procedures ie egd or colonoscopy , will d/w GI , will get patient and out of bed eval , on clear liquids   patient also has mitral insufficiency and valvular dx , high risk for cva due to a fib and abnl valves   patient hg dropped again , will repeat in am , on PPI , and will have to consider starting Lovenox for dvt prophylax , dtr aware of risk of cva tia , and over all a poor candidate for any aggressive therapy ,   GI noted ,   also uti enterococcus ? colonization will monitor for now   UC also shows Morganella , and VRE   will do consider abx for ? uti , also keep here till tomorrow , assess hg and monitor , po intake is poor ,   d/w dtr ,   ID noted no need for abx , patient stable, ct abd noted diverticulosis ,  , if hg stable , plan dc to rehab if dtr agrees

## 2018-08-24 NOTE — PROGRESS NOTE ADULT - SUBJECTIVE AND OBJECTIVE BOX
INTERVAL HPI/OVERNIGHT EVENTS:  pt seen and examined  denies n/v/abd pain, +bm  per overnight rn no s/s overt gib  afebrile overnight labs pending    MEDICATIONS  (STANDING):  levothyroxine 75 MICROGram(s) Oral daily  metoprolol succinate ER 25 milliGRAM(s) Oral daily  pantoprazole    Tablet 40 milliGRAM(s) Oral two times a day  sucralfate suspension 1 Gram(s) Oral two times a day    MEDICATIONS  (PRN):  acetaminophen   Tablet. 650 milliGRAM(s) Oral every 6 hours PRN Mild Pain (1 - 3)      Allergies    No Known Allergies    Intolerances        Review of Systems:    General:  No wt loss, fevers, chills, night sweats, fatigue   Eyes:  Good vision, no reported pain  ENT:  No sore throat, pain, runny nose, dysphagia  CV:  No pain, palpitations, hypo/hypertension  Resp:  No dyspnea, cough, tachypnea, wheezing  GI:  No pain, No nausea, No vomiting, No diarrhea, No constipation, No weight loss, No fever, No pruritis, No rectal bleeding, No melena, No dysphagia  :  No pain, bleeding, incontinence, nocturia  Muscle:  No pain, weakness  Neuro:  No weakness, tingling, memory problems  Psych:  No fatigue, insomnia, mood problems, depression  Endocrine:  No polyuria, polydypsia, cold/heat intolerance  Heme:  No petechiae, ecchymosis, easy bruisability  Skin:  No rash, tattoos, scars, edema      Vital Signs Last 24 Hrs  T(C): 36.6 (24 Aug 2018 04:34), Max: 36.6 (23 Aug 2018 14:19)  T(F): 97.8 (24 Aug 2018 04:34), Max: 97.8 (23 Aug 2018 14:19)  HR: 76 (24 Aug 2018 04:34) (73 - 76)  BP: 112/58 (24 Aug 2018 04:34) (112/58 - 128/71)  BP(mean): --  RR: 16 (24 Aug 2018 04:34) (16 - 16)  SpO2: 95% (24 Aug 2018 04:34) (93% - 96%)    PHYSICAL EXAM:    Constitutional: NAD, lying in bed  HEENT: EOMI, perrl  Neck: No LAD  Respiratory: dec bs  Cardiovascular: S1 and S2, RRR  Gastrointestinal: BS+, soft, NT, mild DT  Extremities: No peripheral edema  Vascular: 2+ peripheral pulses  Neurological: Awake alert some confusion  Skin: No rashes      LABS:                        7.8    4.61  )-----------( 208      ( 23 Aug 2018 05:34 )             23.6     08-23    141  |  111<H>  |  33<H>  ----------------------------<  106<H>  3.7   |  21<L>  |  1.10    Ca    7.6<L>      23 Aug 2018 05:34            RADIOLOGY & ADDITIONAL TESTS:  < from: CT Abdomen and Pelvis No Cont (08.23.18 @ 08:50) >    EXAM:  CT ABDOMEN AND PELVIS                            PROCEDURE DATE:  08/23/2018          INTERPRETATION:  Clinical information: GI bleeding    Comparison: None    PROCEDURE:     CT of the Abdomen and Pelvis was performed without intravenous contrast.    Evaluation of abdominal and pelvic viscera, lymph nodes and vasculature   is limited without intravenous contrast.    FINDINGS:    LOWER CHEST: Small bilateral pleural effusions. Bibasilar   atelectasis/pneumonia and endobronchial mucous. Moderate hiatal hernia   with distal esophageal fluid and 3. Correlate for aspiration.    ABDOMEN:  LIVER: within normal limits  BILE DUCTS: normal caliber  GALLBLADDER: no calcified gallstones. normal caliber wall  PANCREAS: within normal limits  SPLEEN: within normal limits  ADRENALS: within normal limits  KIDNEYS: within normal limits    PELVIS:  REPRODUCTIVE ORGANS: no pelvic masses  BLADDER: within normal limits    BOWEL: Sigmoid diverticulosis. No bowel obstruction.  PERITONEUM: no ascites or free air, no fluid collection  RETROPERITONEUM: no enlarged retroperitoneal or pelvic nodes.    VESSELS: Extensively atherosclerotic. Flattened inferior vena cava   suggesting volume depletion.  ABDOMINAL WALL: within normal limits.  MUSCULOSKELETAL: within normal limits.    IMPRESSION:     Sigmoid diverticulosis. Evaluation for active GI bleeding isn't possible   without contrast/CT angiogram.                      SHAHNAZ CROCKER M.D., ATTENDING RADIOLOGIST  This document has been electronically signed. Aug 23 2018  9:09AM                < end of copied text >

## 2018-08-25 LAB
BASOPHILS # BLD AUTO: 0.03 K/UL — SIGNIFICANT CHANGE UP (ref 0–0.2)
BASOPHILS NFR BLD AUTO: 0.5 % — SIGNIFICANT CHANGE UP (ref 0–2)
CULTURE RESULTS: SIGNIFICANT CHANGE UP
CULTURE RESULTS: SIGNIFICANT CHANGE UP
EOSINOPHIL # BLD AUTO: 0.02 K/UL — SIGNIFICANT CHANGE UP (ref 0–0.5)
EOSINOPHIL NFR BLD AUTO: 0.3 % — SIGNIFICANT CHANGE UP (ref 0–6)
FERRITIN SERPL-MCNC: 84 NG/ML — SIGNIFICANT CHANGE UP (ref 15–150)
HCT VFR BLD CALC: 25.4 % — LOW (ref 34.5–45)
HGB BLD-MCNC: 8.2 G/DL — LOW (ref 11.5–15.5)
IMM GRANULOCYTES NFR BLD AUTO: 0.5 % — SIGNIFICANT CHANGE UP (ref 0–1.5)
LYMPHOCYTES # BLD AUTO: 1.28 K/UL — SIGNIFICANT CHANGE UP (ref 1–3.3)
LYMPHOCYTES # BLD AUTO: 21.6 % — SIGNIFICANT CHANGE UP (ref 13–44)
MCHC RBC-ENTMCNC: 28 PG — SIGNIFICANT CHANGE UP (ref 27–34)
MCHC RBC-ENTMCNC: 32.3 GM/DL — SIGNIFICANT CHANGE UP (ref 32–36)
MCV RBC AUTO: 86.7 FL — SIGNIFICANT CHANGE UP (ref 80–100)
MONOCYTES # BLD AUTO: 0.58 K/UL — SIGNIFICANT CHANGE UP (ref 0–0.9)
MONOCYTES NFR BLD AUTO: 9.8 % — SIGNIFICANT CHANGE UP (ref 2–14)
NEUTROPHILS # BLD AUTO: 3.98 K/UL — SIGNIFICANT CHANGE UP (ref 1.8–7.4)
NEUTROPHILS NFR BLD AUTO: 67.3 % — SIGNIFICANT CHANGE UP (ref 43–77)
NRBC # BLD: 0 /100 WBCS — SIGNIFICANT CHANGE UP (ref 0–0)
PLATELET # BLD AUTO: 213 K/UL — SIGNIFICANT CHANGE UP (ref 150–400)
RBC # BLD: 2.93 M/UL — LOW (ref 3.8–5.2)
RBC # FLD: 19.9 % — HIGH (ref 10.3–14.5)
SPECIMEN SOURCE: SIGNIFICANT CHANGE UP
SPECIMEN SOURCE: SIGNIFICANT CHANGE UP
WBC # BLD: 5.92 K/UL — SIGNIFICANT CHANGE UP (ref 3.8–10.5)
WBC # FLD AUTO: 5.92 K/UL — SIGNIFICANT CHANGE UP (ref 3.8–10.5)

## 2018-08-25 RX ORDER — FERROUS SULFATE 325(65) MG
300 TABLET ORAL
Qty: 0 | Refills: 0 | Status: DISCONTINUED | OUTPATIENT
Start: 2018-08-25 | End: 2018-08-27

## 2018-08-25 RX ORDER — FERROUS SULFATE 325(65) MG
5 TABLET ORAL
Qty: 0 | Refills: 0 | COMMUNITY
Start: 2018-08-25

## 2018-08-25 RX ORDER — DOCUSATE SODIUM 100 MG
1 CAPSULE ORAL
Qty: 0 | Refills: 0 | COMMUNITY
Start: 2018-08-25

## 2018-08-25 RX ORDER — FERROUS SULFATE 325(65) MG
60 TABLET ORAL
Qty: 0 | Refills: 0 | COMMUNITY
Start: 2018-08-25

## 2018-08-25 RX ORDER — MULTIVIT-MIN/FERROUS GLUCONATE 9 MG/15 ML
1 LIQUID (ML) ORAL DAILY
Qty: 0 | Refills: 0 | Status: DISCONTINUED | OUTPATIENT
Start: 2018-08-25 | End: 2018-08-27

## 2018-08-25 RX ORDER — MULTIVIT-MIN/FERROUS GLUCONATE 9 MG/15 ML
1 LIQUID (ML) ORAL
Qty: 0 | Refills: 0 | COMMUNITY
Start: 2018-08-25

## 2018-08-25 RX ORDER — DOCUSATE SODIUM 100 MG
2 CAPSULE ORAL
Qty: 0 | Refills: 0 | COMMUNITY
Start: 2018-08-25

## 2018-08-25 RX ADMIN — Medication 1 GRAM(S): at 17:12

## 2018-08-25 RX ADMIN — Medication 1 GRAM(S): at 05:46

## 2018-08-25 RX ADMIN — Medication 100 MILLIGRAM(S): at 21:18

## 2018-08-25 RX ADMIN — Medication 100 MILLIGRAM(S): at 14:23

## 2018-08-25 RX ADMIN — Medication 300 MILLIGRAM(S): at 17:12

## 2018-08-25 RX ADMIN — Medication 25 MILLIGRAM(S): at 05:45

## 2018-08-25 RX ADMIN — PANTOPRAZOLE SODIUM 40 MILLIGRAM(S): 20 TABLET, DELAYED RELEASE ORAL at 05:46

## 2018-08-25 RX ADMIN — SENNA PLUS 2 TABLET(S): 8.6 TABLET ORAL at 21:18

## 2018-08-25 RX ADMIN — PANTOPRAZOLE SODIUM 40 MILLIGRAM(S): 20 TABLET, DELAYED RELEASE ORAL at 17:12

## 2018-08-25 RX ADMIN — Medication 75 MICROGRAM(S): at 05:46

## 2018-08-25 NOTE — PROGRESS NOTE ADULT - SUBJECTIVE AND OBJECTIVE BOX
PCP  Subjective:   in bed , sleeping mostly , arousable , denies pain or hunger ,    seems calm , poor  po intake , she pulled out iv line again , will change to po iron     Objective:   Vital Signs Last 24 Hrs  T(C): 37.1 (08-25-18 @ 05:12), Max: 37.1 (08-25-18 @ 05:12)  T(F): 98.7 (08-25-18 @ 05:12), Max: 98.7 (08-25-18 @ 05:12)  HR: 71 (08-25-18 @ 05:12) (71 - 83)  BP: 124/75 (08-25-18 @ 05:12) (118/82 - 129/74)  BP(mean): --  RR: 16 (08-25-18 @ 05:12) (16 - 16)  SpO2: 93% (08-25-18 @ 05:12) (90% - 95%)  Daily     Daily       GENERAL:  wdwn female , sleeping , arousable , confused , weak ,   EYES: eomi  NECK: supple , but general stiffness  CHEST/LUNG: clear anteriorly   HEART: s1 s2 irregularly irregular 2/6 justine   ABDOMEN:  soft NT + bs  EXTREMITIES:  no edema   SKIN:  warm , dry , some puffiness  CNS:  awake, , lethargic , weak , poor balance , general stiffness , confused , sleeping mostly   weak , poor balance   looks comfortable ,   speech clear ,       Allergies: Allergies    No Known Allergies    Intolerances        Home Medications:  acetaminophen 325 mg oral tablet: 2 tab(s) orally every 8 hours, As Needed -pain/ back (26 Oct 2017 09:37)  levothyroxine: 75 milligram(s) orally once a day (26 Oct 2017 09:37)  RABEprazole 20 mg oral tablet, extended release: 2 tab(s) orally once a day (26 Oct 2017 09:37)  Vitamin B12:  (16 Aug 2018 07:26)    Medications:   acetaminophen   Tablet. 650 milliGRAM(s) Oral every 6 hours PRN  docusate sodium 100 milliGRAM(s) Oral three times a day  ferrous    sulfate Liquid 300 milliGRAM(s) Oral two times a day with meals  levothyroxine 75 MICROGram(s) Oral daily  metoprolol succinate ER 25 milliGRAM(s) Oral daily  multivitamin/minerals 1 Tablet(s) Oral daily  pantoprazole    Tablet 40 milliGRAM(s) Oral two times a day  senna 2 Tablet(s) Oral at bedtime  sucralfate suspension 1 Gram(s) Oral two times a day      LABS:                        8.2    5.92  )-----------( 213      ( 25 Aug 2018 08:01 )             25.4     08-24    142  |  111<H>  |  29<H>  ----------------------------<  83  4.5   |  24  |  1.10    Ca    7.7<L>      24 Aug 2018 09:03        08-20 @ 15:25  INR 1.14        CAPILLARY BLOOD GLUCOSE              RECENT CULTURES:  Culture Results:   No growth to date. (08-20 @ 19:03)  Culture Results:   No growth to date. (08-20 @ 19:03)  Culture Results:   >100,000 CFU/ml Enterococcus faecium (vancomycin resistant)  50,000 - 99,000 CFU/mL Morganella morganii (08-20 @ 18:37)        Culture - Blood (collected 08-20-18 @ 19:03)  Source: .Blood Blood  Preliminary Report (08-21-18 @ 20:01):    No growth to date.    Culture - Blood (collected 08-20-18 @ 19:03)  Source: .Blood Blood  Preliminary Report (08-21-18 @ 20:01):    No growth to date.    Culture - Urine (collected 08-20-18 @ 18:37)  Source: .Urine Clean Catch (Midstream)  Final Report (08-22-18 @ 21:25):    >100,000 CFU/ml Enterococcus faecium (vancomycin resistant)    50,000 - 99,000 CFU/mL Morganella morganii  Organism: Enterococcus faecium (vancomycin resistant)  Morganella morganii (08-22-18 @ 21:25)  Organism: Morganella morganii (08-22-18 @ 21:25)      -  Amikacin: S <=8      -  Amoxicillin/Clavulanic Acid: R >16/8      -  Ampicillin: R >16 These ampicillin results predict results for amoxicillin      -  Ampicillin/Sulbactam: R >16/8      -  Aztreonam: R >16      -  Cefazolin: R >16      -  Cefepime: I 16      -  Cefoxitin: R >16      -  Ceftriaxone: R 32 Enterobacter, Citrobacter, and Serratia may develop resistance during prolonged therapy      -  Ciprofloxacin: R >2      -  Ertapenem: S <=0.5      -  Gentamicin: R >8      -  Imipenem: I 2      -  Levofloxacin: R >4      -  Meropenem: S <=1      -  Nitrofurantoin: R >64 Should not be used to treat pyelonephritis      -  Piperacillin/Tazobactam: R >64      -  Tigecycline: R <=1      -  Tobramycin: I 8      -  Trimethoprim/Sulfamethoxazole: R >2/38      Method Type: LEVI  Organism: Enterococcus faecium (vancomycin resistant) (08-22-18 @ 21:25)      -  Ampicillin: R >8 Predicts results to ampicillin/sulbactam, amoxacillin-clavulanate and  piperacillin-tazobactam.      -  Ciprofloxacin: R >2      -  Daptomycin: S 4      -  Levofloxacin: R >4      -  Linezolid: S 2      -  Nitrofurantoin: S <=32 Should not be used to treat pyelonephritis.      -  Tetra/Doxy: R >8      -  Vancomycin: R >16      Method Type: LEVI

## 2018-08-25 NOTE — PROGRESS NOTE ADULT - PROBLEM SELECTOR PLAN 1
am labs pending  hgb trend noted, low but stable, no overt s/s gib  fobt +  ct a/p showed sigmoid diverticulosis, no rp collection  daily cbc, transfuse prn   cont ppi, carafate  iron studies c/w mando  gi attg spoke w pt's family re: plan of care, monitor for now, will consider egd if urgently needed  dc planning if hgb stable

## 2018-08-25 NOTE — PROGRESS NOTE ADULT - SUBJECTIVE AND OBJECTIVE BOX
INTERVAL HPI/OVERNIGHT EVENTS:  pt seen and examined  lethargic in bed but denies abd pain, +bm yesterday  per overnight rn no s/s overt gib   labs pending afebrile overnight    MEDICATIONS  (STANDING):  docusate sodium 100 milliGRAM(s) Oral three times a day  levothyroxine 75 MICROGram(s) Oral daily  metoprolol succinate ER 25 milliGRAM(s) Oral daily  pantoprazole    Tablet 40 milliGRAM(s) Oral two times a day  senna 2 Tablet(s) Oral at bedtime  sucralfate suspension 1 Gram(s) Oral two times a day    MEDICATIONS  (PRN):  acetaminophen   Tablet. 650 milliGRAM(s) Oral every 6 hours PRN Mild Pain (1 - 3)      Allergies    No Known Allergies    Intolerances        Review of Systems:    unable to obtain in entirety      Vital Signs Last 24 Hrs  T(C): 37.1 (25 Aug 2018 05:12), Max: 37.1 (25 Aug 2018 05:12)  T(F): 98.7 (25 Aug 2018 05:12), Max: 98.7 (25 Aug 2018 05:12)  HR: 71 (25 Aug 2018 05:12) (71 - 83)  BP: 124/75 (25 Aug 2018 05:12) (118/82 - 129/74)  BP(mean): --  RR: 16 (25 Aug 2018 05:12) (16 - 16)  SpO2: 93% (25 Aug 2018 05:12) (90% - 95%)    PHYSICAL EXAM:    Constitutional: NAD, lying in bed  HEENT: EOMI, perrl  Neck: No LAD  Respiratory: dec bs  Cardiovascular: S1 and S2, RRR  Gastrointestinal: BS+, soft, NT, mild DT  Extremities: No peripheral edema  Vascular: 2+ peripheral pulses  Neurological: Awake alert some confusion  Skin: No rashes    LABS:                        7.7    4.48  )-----------( 196      ( 24 Aug 2018 09:03 )             24.6     08-24    142  |  111<H>  |  29<H>  ----------------------------<  83  4.5   |  24  |  1.10    Ca    7.7<L>      24 Aug 2018 09:03            RADIOLOGY & ADDITIONAL TESTS:

## 2018-08-25 NOTE — PROGRESS NOTE ADULT - ASSESSMENT
89F with PMH of advanced dementia, HTN, HLD, TIA, GERD, Barretts esophagus, CAD s/p (2 stents; 2002, 2012) admitted for sepsis 2/2 UTI, gram + bacteremia,  AMS likely  hospital acquired delirium and suspected metabolic encephalopathy secondary to pyelonephritis,  SARA, and now new onset Afib with RVR. Patient was on 1:1 observation for safety. And was seen by Neuro and Psych. Patient was started on IV antibiotics for UTI. Blood cultures were negative, one sample came back positive for alpha hemolytic Strep that was likely contaminant per ID. Patient had swallow evaluation and recommended dysphagia 2 diet. Antibiotics switched to oral. Mental status improved to baseline. Patient also found to have new onset atrial fibrillation with RVR that improved with metoprolol and was started on Lovenox here but patient not a candidate for long term anticoagulation secondary to advance dementia and fall risk and risk of bleeding but ok with aspirin 325mg po daily. Patient to go to [facility] and will f/u with cardiologist, PCP as out patient. Pt also has valvular insufficieny and dx and is at high risk of cva or tia ,   patient was in rehab and noted to be weak , nd pale , and had hematochezia and was transferred for low hg 7.1 and in er 6.9 and transfused , and admitted for GI bleed ,   case rev with dtr and agree with plan transfuse , and monitor ,   patient has multiple medical issues and is DNR and DNI ,   prognosis is poor ,   patient admitted with guiac positive stool and low hg and post 2 units bloods ,   dtr agrees that patient may not be able to tolerate any procedures ie egd or colonoscopy , will d/w GI , will get patient and out of bed eval , on clear liquids   patient also has mitral insufficiency and valvular dx , high risk for cva due to a fib and abnl valves   patient hg dropped again , will repeat in am , on PPI , and will have to consider starting Lovenox for dvt prophylax , dtr aware of risk of cva tia , and over all a poor candidate for any aggressive therapy ,   GI noted ,   also uti enterococcus ? colonization will monitor for now   UC also shows Morganella , and VRE   will do consider abx for ? uti , also keep here till tomorrow , assess hg and monitor , po intake is poor ,   d/w dtr ,   ID noted no need for abx , patient stable, ct abd noted diverticulosis ,  , if hg stable , plan dc to rehab if dtr agrees  patient kept in hospital for venofer transfusion , but she keeps pulling iv out , and will add po vitamins , and plan dc on   Monday , she has poor po intake and is weak and sleeps mostly , but seems calm and comfortable

## 2018-08-26 RX ADMIN — PANTOPRAZOLE SODIUM 40 MILLIGRAM(S): 20 TABLET, DELAYED RELEASE ORAL at 06:35

## 2018-08-26 RX ADMIN — Medication 25 MILLIGRAM(S): at 06:35

## 2018-08-26 RX ADMIN — Medication 1 GRAM(S): at 17:28

## 2018-08-26 RX ADMIN — Medication 300 MILLIGRAM(S): at 08:18

## 2018-08-26 RX ADMIN — Medication 75 MICROGRAM(S): at 06:35

## 2018-08-26 RX ADMIN — Medication 300 MILLIGRAM(S): at 17:28

## 2018-08-26 RX ADMIN — Medication 1 TABLET(S): at 14:08

## 2018-08-26 RX ADMIN — Medication 100 MILLIGRAM(S): at 14:08

## 2018-08-26 RX ADMIN — Medication 1 GRAM(S): at 06:35

## 2018-08-26 RX ADMIN — PANTOPRAZOLE SODIUM 40 MILLIGRAM(S): 20 TABLET, DELAYED RELEASE ORAL at 17:28

## 2018-08-26 NOTE — PROGRESS NOTE ADULT - PROBLEM SELECTOR PLAN 1
hgb trend noted, low but stable, no overt s/s gib  fobt +  ct a/p showed sigmoid diverticulosis, no rp collection  daily cbc, transfuse prn   cont ppi, carafate  iron studies c/w mando  gi attg spoke w pt's family re: plan of care, monitor for now, will consider egd if urgently needed  dc planning if hgb remains stable

## 2018-08-26 NOTE — PROGRESS NOTE ADULT - SUBJECTIVE AND OBJECTIVE BOX
PCP  Subjective:   in bed , per nurse ate better , awake , denies pain    Objective:   Vital Signs Last 24 Hrs  T(C): 36.3 (08-26-18 @ 04:35), Max: 37.1 (08-25-18 @ 21:20)  T(F): 97.3 (08-26-18 @ 04:35), Max: 98.8 (08-25-18 @ 21:20)  HR: 75 (08-26-18 @ 04:35) (19 - 84)  BP: 150/80 (08-26-18 @ 04:35) (104/59 - 150/80)  BP(mean): --  RR: 18 (08-26-18 @ 04:35) (16 - 18)  SpO2: 95% (08-26-18 @ 04:35) (93% - 95%)  Daily     Daily        GENERAL:  wdwn female , sleeping , arousable , confused , weak ,   EYES: eomi  NECK: supple , but general stiffness  CHEST/LUNG: clear anteriorly   HEART: s1 s2 irregularly irregular 2/6 justine   ABDOMEN:  soft NT + bs  EXTREMITIES:  no edema   SKIN:  warm , dry , some puffiness  CNS:  awake, , lethargic , weak , poor balance , general stiffness , confused , sleeping mostly   weak , poor balance   looks comfortable ,   speech clear ,         Allergies: Allergies    No Known Allergies    Intolerances        Home Medications:  acetaminophen 325 mg oral tablet: 2 tab(s) orally every 8 hours, As Needed -pain/ back (26 Oct 2017 09:37)  docusate sodium 100 mg oral capsule: 1 cap(s) orally 3 times a day (25 Aug 2018 12:33)  ferrous sulfate 300 mg/5 mL (60 mg elemental iron) oral liquid: 5 milliliter(s) orally 2 times a day (with meals) (25 Aug 2018 12:33)  levothyroxine: 75 milligram(s) orally once a day (26 Oct 2017 09:37)  Multiple Vitamins with Minerals oral tablet: 1 tab(s) orally once a day (25 Aug 2018 12:33)  RABEprazole 20 mg oral tablet, extended release: 2 tab(s) orally once a day (26 Oct 2017 09:37)  Vitamin B12:  (16 Aug 2018 07:26)    Medications:   acetaminophen   Tablet. 650 milliGRAM(s) Oral every 6 hours PRN  docusate sodium 100 milliGRAM(s) Oral three times a day  ferrous    sulfate Liquid 300 milliGRAM(s) Oral two times a day with meals  levothyroxine 75 MICROGram(s) Oral daily  metoprolol succinate ER 25 milliGRAM(s) Oral daily  multivitamin/minerals 1 Tablet(s) Oral daily  pantoprazole    Tablet 40 milliGRAM(s) Oral two times a day  senna 2 Tablet(s) Oral at bedtime  sucralfate suspension 1 Gram(s) Oral two times a day      LABS:                        8.2    5.92  )-----------( 213      ( 25 Aug 2018 08:01 )             25.4                   CAPILLARY BLOOD GLUCOSE              RECENT CULTURES:  Culture Results:   No growth at 5 days. (08-20 @ 19:03)  Culture Results:   No growth at 5 days. (08-20 @ 19:03)  Culture Results:   >100,000 CFU/ml Enterococcus faecium (vancomycin resistant)  50,000 - 99,000 CFU/mL Morganella morganii (08-20 @ 18:37)        Culture - Blood (collected 08-20-18 @ 19:03)  Source: .Blood Blood  Final Report (08-25-18 @ 20:00):    No growth at 5 days.    Culture - Blood (collected 08-20-18 @ 19:03)  Source: .Blood Blood  Final Report (08-25-18 @ 20:00):    No growth at 5 days.    Culture - Urine (collected 08-20-18 @ 18:37)  Source: .Urine Clean Catch (Midstream)  Final Report (08-22-18 @ 21:25):    >100,000 CFU/ml Enterococcus faecium (vancomycin resistant)    50,000 - 99,000 CFU/mL Morganella morganii  Organism: Enterococcus faecium (vancomycin resistant)  Morganella morganii (08-22-18 @ 21:25)  Organism: Morganella morganii (08-22-18 @ 21:25)      -  Amikacin: S <=8      -  Amoxicillin/Clavulanic Acid: R >16/8      -  Ampicillin: R >16 These ampicillin results predict results for amoxicillin      -  Ampicillin/Sulbactam: R >16/8      -  Aztreonam: R >16      -  Cefazolin: R >16      -  Cefepime: I 16      -  Cefoxitin: R >16      -  Ceftriaxone: R 32 Enterobacter, Citrobacter, and Serratia may develop resistance during prolonged therapy      -  Ciprofloxacin: R >2      -  Ertapenem: S <=0.5      -  Gentamicin: R >8      -  Imipenem: I 2      -  Levofloxacin: R >4      -  Meropenem: S <=1      -  Nitrofurantoin: R >64 Should not be used to treat pyelonephritis      -  Piperacillin/Tazobactam: R >64      -  Tigecycline: R <=1      -  Tobramycin: I 8      -  Trimethoprim/Sulfamethoxazole: R >2/38      Method Type: LEVI  Organism: Enterococcus faecium (vancomycin resistant) (08-22-18 @ 21:25)      -  Ampicillin: R >8 Predicts results to ampicillin/sulbactam, amoxacillin-clavulanate and  piperacillin-tazobactam.      -  Ciprofloxacin: R >2      -  Daptomycin: S 4      -  Levofloxacin: R >4      -  Linezolid: S 2      -  Nitrofurantoin: S <=32 Should not be used to treat pyelonephritis.      -  Tetra/Doxy: R >8      -  Vancomycin: R >16      Method Type: LEVI

## 2018-08-26 NOTE — PROGRESS NOTE ADULT - SUBJECTIVE AND OBJECTIVE BOX
INTERVAL HPI/OVERNIGHT EVENTS:  pt seen and examined  denies n/v/abd pain, +bm yesterday  per overnight rn no overt s/s gib  afebrile overnight no new labs    MEDICATIONS  (STANDING):  docusate sodium 100 milliGRAM(s) Oral three times a day  ferrous    sulfate Liquid 300 milliGRAM(s) Oral two times a day with meals  levothyroxine 75 MICROGram(s) Oral daily  metoprolol succinate ER 25 milliGRAM(s) Oral daily  multivitamin/minerals 1 Tablet(s) Oral daily  pantoprazole    Tablet 40 milliGRAM(s) Oral two times a day  senna 2 Tablet(s) Oral at bedtime  sucralfate suspension 1 Gram(s) Oral two times a day    MEDICATIONS  (PRN):  acetaminophen   Tablet. 650 milliGRAM(s) Oral every 6 hours PRN Mild Pain (1 - 3)      Allergies    No Known Allergies    Intolerances        Review of Systems:    unable to obtain in entirety      Vital Signs Last 24 Hrs  T(C): 36.3 (26 Aug 2018 04:35), Max: 37.1 (25 Aug 2018 21:20)  T(F): 97.3 (26 Aug 2018 04:35), Max: 98.8 (25 Aug 2018 21:20)  HR: 75 (26 Aug 2018 04:35) (19 - 84)  BP: 150/80 (26 Aug 2018 04:35) (104/59 - 150/80)  BP(mean): --  RR: 18 (26 Aug 2018 04:35) (16 - 18)  SpO2: 95% (26 Aug 2018 04:35) (93% - 95%)    PHYSICAL EXAM:  Constitutional: NAD, lying in bed  HEENT: EOMI, perrl  Neck: No LAD  Respiratory: dec bs  Cardiovascular: S1 and S2, RRR  Gastrointestinal: BS+, soft, NT, mild DT  Extremities: No peripheral edema  Vascular: 2+ peripheral pulses  Neurological: Awake alert some confusion  Skin: No rashes    LABS:                        8.2    5.92  )-----------( 213      ( 25 Aug 2018 08:01 )             25.4     08-24    142  |  111<H>  |  29<H>  ----------------------------<  83  4.5   |  24  |  1.10    Ca    7.7<L>      24 Aug 2018 09:03            RADIOLOGY & ADDITIONAL TESTS:

## 2018-08-26 NOTE — PROGRESS NOTE ADULT - ASSESSMENT
89F with PMH of advanced dementia, HTN, HLD, TIA, GERD, Barretts esophagus, CAD s/p (2 stents; 2002, 2012) admitted for sepsis 2/2 UTI, gram + bacteremia,  AMS likely  hospital acquired delirium and suspected metabolic encephalopathy secondary to pyelonephritis,  SARA, and now new onset Afib with RVR. Patient was on 1:1 observation for safety. And was seen by Neuro and Psych. Patient was started on IV antibiotics for UTI. Blood cultures were negative, one sample came back positive for alpha hemolytic Strep that was likely contaminant per ID. Patient had swallow evaluation and recommended dysphagia 2 diet. Antibiotics switched to oral. Mental status improved to baseline. Patient also found to have new onset atrial fibrillation with RVR that improved with metoprolol and was started on Lovenox here but patient not a candidate for long term anticoagulation secondary to advance dementia and fall risk and risk of bleeding but ok with aspirin 325mg po daily. Patient to go to [facility] and will f/u with cardiologist, PCP as out patient. Pt also has valvular insufficieny and dx and is at high risk of cva or tia ,   patient was in rehab and noted to be weak , nd pale , and had hematochezia and was transferred for low hg 7.1 and in er 6.9 and transfused , and admitted for GI bleed ,   case rev with dtr and agree with plan transfuse , and monitor ,   patient has multiple medical issues and is DNR and DNI ,   prognosis is poor ,   patient admitted with guiac positive stool and low hg and post 2 units bloods ,   dtr agrees that patient may not be able to tolerate any procedures ie egd or colonoscopy , will d/w GI , will get patient and out of bed eval , on clear liquids   patient also has mitral insufficiency and valvular dx , high risk for cva due to a fib and abnl valves   patient hg dropped again , will repeat in am , on PPI , and will have to consider starting Lovenox for dvt prophylax , dtr aware of risk of cva tia , and over all a poor candidate for any aggressive therapy ,   GI noted ,   also uti enterococcus ? colonization will monitor for now   UC also shows Morganella , and VRE   will do consider abx for ? uti , also keep here till tomorrow , assess hg and monitor , po intake is poor ,   d/w dtr ,   ID noted no need for abx , patient stable, ct abd noted diverticulosis ,  , if hg stable , plan dc to rehab if dtr agrees  patient kept in hospital for venofer transfusion , but she keeps pulling iv out , and will add po vitamins , and plan dc on   Monday , she has poor po intake and is weak and sleeps mostly , but seems calm and comfortable

## 2018-08-27 VITALS
TEMPERATURE: 98 F | SYSTOLIC BLOOD PRESSURE: 123 MMHG | OXYGEN SATURATION: 94 % | HEART RATE: 63 BPM | RESPIRATION RATE: 16 BRPM | DIASTOLIC BLOOD PRESSURE: 80 MMHG

## 2018-08-27 LAB
ANION GAP SERPL CALC-SCNC: 8 MMOL/L — SIGNIFICANT CHANGE UP (ref 5–17)
BASOPHILS # BLD AUTO: 0.03 K/UL — SIGNIFICANT CHANGE UP (ref 0–0.2)
BASOPHILS NFR BLD AUTO: 0.4 % — SIGNIFICANT CHANGE UP (ref 0–2)
BUN SERPL-MCNC: 21 MG/DL — SIGNIFICANT CHANGE UP (ref 7–23)
CALCIUM SERPL-MCNC: 8.2 MG/DL — LOW (ref 8.5–10.1)
CHLORIDE SERPL-SCNC: 101 MMOL/L — SIGNIFICANT CHANGE UP (ref 96–108)
CO2 SERPL-SCNC: 25 MMOL/L — SIGNIFICANT CHANGE UP (ref 22–31)
CREAT SERPL-MCNC: 1.1 MG/DL — SIGNIFICANT CHANGE UP (ref 0.5–1.3)
EOSINOPHIL # BLD AUTO: 0.03 K/UL — SIGNIFICANT CHANGE UP (ref 0–0.5)
EOSINOPHIL NFR BLD AUTO: 0.4 % — SIGNIFICANT CHANGE UP (ref 0–6)
GLUCOSE SERPL-MCNC: 102 MG/DL — HIGH (ref 70–99)
HCT VFR BLD CALC: 30.7 % — LOW (ref 34.5–45)
HGB BLD-MCNC: 9.7 G/DL — LOW (ref 11.5–15.5)
IMM GRANULOCYTES NFR BLD AUTO: 0.4 % — SIGNIFICANT CHANGE UP (ref 0–1.5)
LYMPHOCYTES # BLD AUTO: 1.33 K/UL — SIGNIFICANT CHANGE UP (ref 1–3.3)
LYMPHOCYTES # BLD AUTO: 18.9 % — SIGNIFICANT CHANGE UP (ref 13–44)
MCHC RBC-ENTMCNC: 27.7 PG — SIGNIFICANT CHANGE UP (ref 27–34)
MCHC RBC-ENTMCNC: 31.6 GM/DL — LOW (ref 32–36)
MCV RBC AUTO: 87.7 FL — SIGNIFICANT CHANGE UP (ref 80–100)
MONOCYTES # BLD AUTO: 0.59 K/UL — SIGNIFICANT CHANGE UP (ref 0–0.9)
MONOCYTES NFR BLD AUTO: 8.4 % — SIGNIFICANT CHANGE UP (ref 2–14)
NEUTROPHILS # BLD AUTO: 5.04 K/UL — SIGNIFICANT CHANGE UP (ref 1.8–7.4)
NEUTROPHILS NFR BLD AUTO: 71.5 % — SIGNIFICANT CHANGE UP (ref 43–77)
NRBC # BLD: 0 /100 WBCS — SIGNIFICANT CHANGE UP (ref 0–0)
PLATELET # BLD AUTO: 222 K/UL — SIGNIFICANT CHANGE UP (ref 150–400)
POTASSIUM SERPL-MCNC: 4.8 MMOL/L — SIGNIFICANT CHANGE UP (ref 3.5–5.3)
POTASSIUM SERPL-SCNC: 4.8 MMOL/L — SIGNIFICANT CHANGE UP (ref 3.5–5.3)
RBC # BLD: 3.5 M/UL — LOW (ref 3.8–5.2)
RBC # FLD: 20 % — HIGH (ref 10.3–14.5)
SODIUM SERPL-SCNC: 134 MMOL/L — LOW (ref 135–145)
WBC # BLD: 7.05 K/UL — SIGNIFICANT CHANGE UP (ref 3.8–10.5)
WBC # FLD AUTO: 7.05 K/UL — SIGNIFICANT CHANGE UP (ref 3.8–10.5)

## 2018-08-27 RX ORDER — IRON SUCROSE 20 MG/ML
200 INJECTION, SOLUTION INTRAVENOUS ONCE
Qty: 0 | Refills: 0 | Status: COMPLETED | OUTPATIENT
Start: 2018-08-27 | End: 2018-08-27

## 2018-08-27 RX ADMIN — Medication 1 GRAM(S): at 06:12

## 2018-08-27 RX ADMIN — Medication 75 MICROGRAM(S): at 06:12

## 2018-08-27 RX ADMIN — Medication 25 MILLIGRAM(S): at 06:12

## 2018-08-27 RX ADMIN — IRON SUCROSE 110 MILLIGRAM(S): 20 INJECTION, SOLUTION INTRAVENOUS at 10:50

## 2018-08-27 RX ADMIN — Medication 100 MILLIGRAM(S): at 06:12

## 2018-08-27 RX ADMIN — Medication 300 MILLIGRAM(S): at 08:13

## 2018-08-27 RX ADMIN — PANTOPRAZOLE SODIUM 40 MILLIGRAM(S): 20 TABLET, DELAYED RELEASE ORAL at 06:12

## 2018-08-27 NOTE — CHART NOTE - NSCHARTNOTEFT_GEN_A_CORE
Assessment: Pt sleeping, family n/a, possible d/c today. seen by SLP, recommend adv diet to Dys 3 cut up with thin liquids. Poor po noted most meals, <25% taken. Some Ensure likely consumed as container at bedside half empty. MOLST noted, no TF wanted. +BM today. MVI added.    Factors impacting intake: [ ] none [ ] nausea  [ ] vomiting [ ] diarrhea [ ] constipation  [ ]chewing problems [x ] swallowing issues  [ ] other:     Diet Presciption: Diet, Dysphagia 1 Pureed-Nectar Consistency Fluid (08-23-18 @ 10:26)    Intake: <25% of meals    No new wt noted    Pertinent Medications: MEDICATIONS  (STANDING):  docusate sodium 100 milliGRAM(s) Oral three times a day  ferrous    sulfate Liquid 300 milliGRAM(s) Oral two times a day with meals  levothyroxine 75 MICROGram(s) Oral daily  metoprolol succinate ER 25 milliGRAM(s) Oral daily  multivitamin/minerals 1 Tablet(s) Oral daily  pantoprazole    Tablet 40 milliGRAM(s) Oral two times a day  senna 2 Tablet(s) Oral at bedtime    MEDICATIONS  (PRN):  acetaminophen   Tablet. 650 milliGRAM(s) Oral every 6 hours PRN Mild Pain (1 - 3)    Pertinent Labs: 08-27 Na134 mmol/L<L> Glu 102 mg/dL<H> K+ 4.8 mmol/L Cr  1.10 mg/dL BUN 21 mg/dL 08-20 Alb 2.0 g/dL<L>     CAPILLARY BLOOD GLUCOSE            Estimated Needs:   [ x] no change since previous assessment  [ ] recalculated:     Previous Nutrition Diagnosis: ** swallowing difficulty  [x ] Inadequate Energy Intake [ ]Inadequate Oral Intake [ ] Excessive Energy Intake   [ ] Underweight [ ] Increased Nutrient Needs [ ] Overweight/Obesity   [ ] Altered GI Function [ ] Unintended Weight Loss [ ] Food & Nutrition Related Knowledge Deficit [ ] Malnutrition     Nutrition Diagnosis is [x ] ongoing  [ ] resolved [ ] not applicable     New Nutrition Diagnosis: [ ] not applicable       Interventions:   Recommend  [x ] Change Diet To: Dysphagia 3 cut up with thin liquids per SLP follow up.   [x ] Nutrition Supplement Add Ensure BID, Ensure pudding to meals  [ ] Nutrition Support  [ ] Other:     Monitoring and Evaluation:   [ ] PO intake [ x ] Tolerance to diet prescription [ x ] weights [ x ] labs[ x ] follow up per protocol  [ ] other:

## 2018-08-27 NOTE — PROGRESS NOTE ADULT - PROBLEM SELECTOR PLAN 1
hgb trend noted, low but stable, no overt s/s gib  fobt +  ct a/p showed sigmoid diverticulosis, no rp collection  daily cbc, transfuse prn   cont ppi  iron studies c/w mando, sp venofer, on po iron supp  gi attg spoke w pt's family re: plan of care, monitor for now, will consider egd if urgently needed  dc planning if hgb remains stable

## 2018-08-27 NOTE — PROGRESS NOTE ADULT - ASSESSMENT
89F with PMH of advanced dementia, HTN, HLD, TIA, GERD, Barretts esophagus, CAD s/p (2 stents; 2002, 2012) admitted for sepsis 2/2 UTI, gram + bacteremia,  AMS likely  hospital acquired delirium and suspected metabolic encephalopathy secondary to pyelonephritis,  SARA, and now new onset Afib with RVR. Patient was on 1:1 observation for safety. And was seen by Neuro and Psych. Patient was started on IV antibiotics for UTI. Blood cultures were negative, one sample came back positive for alpha hemolytic Strep that was likely contaminant per ID. Patient had swallow evaluation and recommended dysphagia 2 diet. Antibiotics switched to oral. Mental status improved to baseline. Patient also found to have new onset atrial fibrillation with RVR that improved with metoprolol and was started on Lovenox here but patient not a candidate for long term anticoagulation secondary to advance dementia and fall risk and risk of bleeding but ok with aspirin 325mg po daily. Patient to go to [facility] ([place]) and will f/u with cardiologist, PCP as out patient. Pt also has valvular insufficieny and dx and is at high risk of cva or tia ,   patient was in rehab and noted to be weak , nd pale , and had hematochezia and was transferred for low hg 7.1 and in er 6.9 and transfused , and admitted for GI bleed ,   case rev with dtr and agree with plan transfuse , and monitor ,   patient has multiple medical issues and is DNR and DNI ,   prognosis is poor ,   patient admitted with guiac positive stool and low hg and post 2 units bloods ,   dtr agrees that patient may not be able to tolerate any procedures ie egd or colonoscopy , will d/w GI , will get patient and out of bed eval , on clear liquids   patient also has mitral insufficiency and valvular dx , high risk for cva due to a fib and abnl valves   patient hg dropped again , will repeat in am , on PPI , and will have to consider starting Lovenox for dvt prophylax , dtr aware of risk of cva tia , and over all a poor candidate for any aggressive therapy ,   GI noted ,   also uti enterococcus ? colonization will monitor for now   UC also shows Morganella , and VRE   will do consider abx for ? uti , also keep here till tomorrow , assess hg and monitor , po intake is poor ,   d/w dtr ,   ID noted no need for abx , patient stable, ct abd noted diverticulosis ,  , if hg stable , plan dc to rehab if dtr agrees  patient hg improved, as per nursing staff and family she is eating food from upside , will do speech re eval , and give another venofer dose , and plan dc to rehab   patient kept in hospital for venofer transfusion , but she keeps pulling iv out , and will add po vitamins , and plan dc on   Monday , she has poor po intake and is weak and sleeps mostly , but seems calm and comfortable

## 2018-08-27 NOTE — PROGRESS NOTE ADULT - PROBLEM SELECTOR PROBLEM 1
Anemia due to blood loss
Gastrointestinal hemorrhage, unspecified gastrointestinal hemorrhage type
R/O Cystitis

## 2018-08-27 NOTE — PROGRESS NOTE ADULT - PROBLEM SELECTOR PLAN 2
gerd precautions  cont ppi  diet as tolerated
monitor

## 2018-08-27 NOTE — PROGRESS NOTE ADULT - SUBJECTIVE AND OBJECTIVE BOX
INTERVAL HPI/OVERNIGHT EVENTS:  pt seen and examined  drowsy but denies abd pain, +bm  no overt s/s gib overnight per nursing  afebrile overnight no new labs to assess      MEDICATIONS  (STANDING):  docusate sodium 100 milliGRAM(s) Oral three times a day  ferrous    sulfate Liquid 300 milliGRAM(s) Oral two times a day with meals  levothyroxine 75 MICROGram(s) Oral daily  metoprolol succinate ER 25 milliGRAM(s) Oral daily  multivitamin/minerals 1 Tablet(s) Oral daily  pantoprazole    Tablet 40 milliGRAM(s) Oral two times a day  senna 2 Tablet(s) Oral at bedtime  sucralfate suspension 1 Gram(s) Oral two times a day    MEDICATIONS  (PRN):  acetaminophen   Tablet. 650 milliGRAM(s) Oral every 6 hours PRN Mild Pain (1 - 3)      Allergies    No Known Allergies    Intolerances        Review of Systems:  unable to obtain in entirety    Vital Signs Last 24 Hrs  T(C): 36.3 (27 Aug 2018 04:35), Max: 36.7 (26 Aug 2018 14:10)  T(F): 97.4 (27 Aug 2018 04:35), Max: 98 (26 Aug 2018 14:10)  HR: 89 (27 Aug 2018 04:35) (87 - 89)  BP: 164/89 (27 Aug 2018 04:35) (155/89 - 164/89)  BP(mean): --  RR: 17 (27 Aug 2018 04:35) (16 - 17)  SpO2: 90% (27 Aug 2018 04:35) (90% - 95%)    PHYSICAL EXAM:    Constitutional: NAD, lying in bed  HEENT: EOMI, perrl  Neck: No LAD  Respiratory: dec bs  Cardiovascular: S1 and S2, RRR  Gastrointestinal: BS+, soft, NT, mild DT  Extremities: No peripheral edema  Vascular: 2+ peripheral pulses  Neurological: Awake alert some confusion  Skin: No rashes    LABS:                        8.2    5.92  )-----------( 213      ( 25 Aug 2018 08:01 )             25.4                 RADIOLOGY & ADDITIONAL TESTS:

## 2018-08-27 NOTE — PROGRESS NOTE ADULT - SUBJECTIVE AND OBJECTIVE BOX
PCP  Subjective:   in bed , more awake , denies pain ,     Objective:   Vital Signs Last 24 Hrs  T(C): 36.3 (08-27-18 @ 04:35), Max: 36.7 (08-26-18 @ 14:10)  T(F): 97.4 (08-27-18 @ 04:35), Max: 98 (08-26-18 @ 14:10)  HR: 89 (08-27-18 @ 04:35) (87 - 89)  BP: 164/89 (08-27-18 @ 04:35) (155/89 - 164/89)  BP(mean): --  RR: 17 (08-27-18 @ 04:35) (16 - 17)  SpO2: 90% (08-27-18 @ 04:35) (90% - 95%)  Daily     Daily      GENERAL:  wdwn female , sleeping , arousable , confused , weak ,   EYES: eomi  NECK: supple , but general stiffness  CHEST/LUNG: clear anteriorly   HEART: s1 s2 irregularly irregular 2/6 justine   ABDOMEN:  soft NT + bs  EXTREMITIES:  no edema   SKIN:  warm , dry , some puffiness  CNS:  awake, , lethargic , weak , poor balance , general stiffness , confused , sleeping mostly   weak , poor balance   looks comfortable ,   speech clear ,       Allergies: Allergies    No Known Allergies    Intolerances        Home Medications:  acetaminophen 325 mg oral tablet: 2 tab(s) orally every 8 hours, As Needed -pain/ back (26 Oct 2017 09:37)  docusate sodium 100 mg oral capsule: 1 cap(s) orally 3 times a day (25 Aug 2018 12:33)  ferrous sulfate 300 mg/5 mL (60 mg elemental iron) oral liquid: 5 milliliter(s) orally 2 times a day (with meals) (25 Aug 2018 12:33)  levothyroxine: 75 milligram(s) orally once a day (26 Oct 2017 09:37)  Multiple Vitamins with Minerals oral tablet: 1 tab(s) orally once a day (25 Aug 2018 12:33)  RABEprazole 20 mg oral tablet, extended release: 2 tab(s) orally once a day (26 Oct 2017 09:37)  Vitamin B12:  (16 Aug 2018 07:26)    Medications:   acetaminophen   Tablet. 650 milliGRAM(s) Oral every 6 hours PRN  docusate sodium 100 milliGRAM(s) Oral three times a day  ferrous    sulfate Liquid 300 milliGRAM(s) Oral two times a day with meals  iron sucrose IVPB 200 milliGRAM(s) IV Intermittent once  levothyroxine 75 MICROGram(s) Oral daily  metoprolol succinate ER 25 milliGRAM(s) Oral daily  multivitamin/minerals 1 Tablet(s) Oral daily  pantoprazole    Tablet 40 milliGRAM(s) Oral two times a day  senna 2 Tablet(s) Oral at bedtime      LABS:                        9.7    7.05  )-----------( 222      ( 27 Aug 2018 08:05 )             30.7                   CAPILLARY BLOOD GLUCOSE              RECENT CULTURES:  Culture Results:   No growth at 5 days. (08-20 @ 19:03)  Culture Results:   No growth at 5 days. (08-20 @ 19:03)  Culture Results:   >100,000 CFU/ml Enterococcus faecium (vancomycin resistant)  50,000 - 99,000 CFU/mL Morganella morganii (08-20 @ 18:37)        Culture - Blood (collected 08-20-18 @ 19:03)  Source: .Blood Blood  Final Report (08-25-18 @ 20:00):    No growth at 5 days.    Culture - Blood (collected 08-20-18 @ 19:03)  Source: .Blood Blood  Final Report (08-25-18 @ 20:00):    No growth at 5 days.    Culture - Urine (collected 08-20-18 @ 18:37)  Source: .Urine Clean Catch (Midstream)  Final Report (08-22-18 @ 21:25):    >100,000 CFU/ml Enterococcus faecium (vancomycin resistant)    50,000 - 99,000 CFU/mL Morganella morganii  Organism: Enterococcus faecium (vancomycin resistant)  Morganella morganii (08-22-18 @ 21:25)  Organism: Morganella morganii (08-22-18 @ 21:25)      -  Amikacin: S <=8      -  Amoxicillin/Clavulanic Acid: R >16/8      -  Ampicillin: R >16 These ampicillin results predict results for amoxicillin      -  Ampicillin/Sulbactam: R >16/8      -  Aztreonam: R >16      -  Cefazolin: R >16      -  Cefepime: I 16      -  Cefoxitin: R >16      -  Ceftriaxone: R 32 Enterobacter, Citrobacter, and Serratia may develop resistance during prolonged therapy      -  Ciprofloxacin: R >2      -  Ertapenem: S <=0.5      -  Gentamicin: R >8      -  Imipenem: I 2      -  Levofloxacin: R >4      -  Meropenem: S <=1      -  Nitrofurantoin: R >64 Should not be used to treat pyelonephritis      -  Piperacillin/Tazobactam: R >64      -  Tigecycline: R <=1      -  Tobramycin: I 8      -  Trimethoprim/Sulfamethoxazole: R >2/38      Method Type: LEVI  Organism: Enterococcus faecium (vancomycin resistant) (08-22-18 @ 21:25)      -  Ampicillin: R >8 Predicts results to ampicillin/sulbactam, amoxacillin-clavulanate and  piperacillin-tazobactam.      -  Ciprofloxacin: R >2      -  Daptomycin: S 4      -  Levofloxacin: R >4      -  Linezolid: S 2      -  Nitrofurantoin: S <=32 Should not be used to treat pyelonephritis.      -  Tetra/Doxy: R >8      -  Vancomycin: R >16      Method Type: LEVI

## 2018-08-27 NOTE — PROGRESS NOTE ADULT - PROBLEM SELECTOR PROBLEM 2
Gastroesophageal reflux disease, esophagitis presence not specified
Coronary artery disease with angina pectoris, unspecified vessel or lesion type, unspecified whether native or transplanted heart

## 2018-09-12 ENCOUNTER — INPATIENT (INPATIENT)
Facility: HOSPITAL | Age: 83
LOS: 6 days | Discharge: OTHER INSTITUTION | DRG: 580 | End: 2018-09-19
Attending: INTERNAL MEDICINE | Admitting: INTERNAL MEDICINE
Payer: MEDICARE

## 2018-09-12 ENCOUNTER — TRANSCRIPTION ENCOUNTER (OUTPATIENT)
Age: 83
End: 2018-09-12

## 2018-09-12 VITALS
SYSTOLIC BLOOD PRESSURE: 123 MMHG | TEMPERATURE: 98 F | WEIGHT: 115.08 LBS | DIASTOLIC BLOOD PRESSURE: 64 MMHG | RESPIRATION RATE: 16 BRPM | HEART RATE: 87 BPM | OXYGEN SATURATION: 96 % | HEIGHT: 61 IN

## 2018-09-12 DIAGNOSIS — L03.90 CELLULITIS, UNSPECIFIED: ICD-10-CM

## 2018-09-12 DIAGNOSIS — G45.9 TRANSIENT CEREBRAL ISCHEMIC ATTACK, UNSPECIFIED: ICD-10-CM

## 2018-09-12 DIAGNOSIS — K92.2 GASTROINTESTINAL HEMORRHAGE, UNSPECIFIED: ICD-10-CM

## 2018-09-12 DIAGNOSIS — Z90.49 ACQUIRED ABSENCE OF OTHER SPECIFIED PARTS OF DIGESTIVE TRACT: Chronic | ICD-10-CM

## 2018-09-12 DIAGNOSIS — I25.118 ATHEROSCLEROTIC HEART DISEASE OF NATIVE CORONARY ARTERY WITH OTHER FORMS OF ANGINA PECTORIS: ICD-10-CM

## 2018-09-12 DIAGNOSIS — H26.9 UNSPECIFIED CATARACT: Chronic | ICD-10-CM

## 2018-09-12 DIAGNOSIS — G30.1 ALZHEIMER'S DISEASE WITH LATE ONSET: ICD-10-CM

## 2018-09-12 DIAGNOSIS — I48.91 UNSPECIFIED ATRIAL FIBRILLATION: ICD-10-CM

## 2018-09-12 DIAGNOSIS — K22.70 BARRETT'S ESOPHAGUS WITHOUT DYSPLASIA: ICD-10-CM

## 2018-09-12 DIAGNOSIS — E03.8 OTHER SPECIFIED HYPOTHYROIDISM: ICD-10-CM

## 2018-09-12 LAB
ALBUMIN SERPL ELPH-MCNC: 1.4 G/DL — LOW (ref 3.3–5)
ALP SERPL-CCNC: 104 U/L — SIGNIFICANT CHANGE UP (ref 40–120)
ALT FLD-CCNC: 20 U/L — SIGNIFICANT CHANGE UP (ref 12–78)
ANION GAP SERPL CALC-SCNC: 9 MMOL/L — SIGNIFICANT CHANGE UP (ref 5–17)
APPEARANCE UR: ABNORMAL
APTT BLD: 26.9 SEC — LOW (ref 27.5–37.4)
AST SERPL-CCNC: 18 U/L — SIGNIFICANT CHANGE UP (ref 15–37)
BASOPHILS # BLD AUTO: 0 K/UL — SIGNIFICANT CHANGE UP (ref 0–0.2)
BASOPHILS NFR BLD AUTO: 0 % — SIGNIFICANT CHANGE UP (ref 0–2)
BILIRUB SERPL-MCNC: 0.4 MG/DL — SIGNIFICANT CHANGE UP (ref 0.2–1.2)
BILIRUB UR-MCNC: NEGATIVE — SIGNIFICANT CHANGE UP
BUN SERPL-MCNC: 33 MG/DL — HIGH (ref 7–23)
CALCIUM SERPL-MCNC: 7.7 MG/DL — LOW (ref 8.5–10.1)
CHLORIDE SERPL-SCNC: 106 MMOL/L — SIGNIFICANT CHANGE UP (ref 96–108)
CO2 SERPL-SCNC: 23 MMOL/L — SIGNIFICANT CHANGE UP (ref 22–31)
COLOR SPEC: YELLOW — SIGNIFICANT CHANGE UP
CREAT SERPL-MCNC: 1.3 MG/DL — SIGNIFICANT CHANGE UP (ref 0.5–1.3)
DIFF PNL FLD: ABNORMAL
EOSINOPHIL # BLD AUTO: 0 K/UL — SIGNIFICANT CHANGE UP (ref 0–0.5)
EOSINOPHIL NFR BLD AUTO: 0 % — SIGNIFICANT CHANGE UP (ref 0–6)
ERYTHROCYTE [SEDIMENTATION RATE] IN BLOOD: 77 MM/HR — HIGH (ref 0–20)
GLUCOSE SERPL-MCNC: 142 MG/DL — HIGH (ref 70–99)
GLUCOSE UR QL: NEGATIVE — SIGNIFICANT CHANGE UP
HCT VFR BLD CALC: 25.5 % — LOW (ref 34.5–45)
HGB BLD-MCNC: 8.1 G/DL — LOW (ref 11.5–15.5)
INR BLD: 1.22 RATIO — HIGH (ref 0.88–1.16)
KETONES UR-MCNC: NEGATIVE — SIGNIFICANT CHANGE UP
LACTATE SERPL-SCNC: 1.9 MMOL/L — SIGNIFICANT CHANGE UP (ref 0.7–2)
LEUKOCYTE ESTERASE UR-ACNC: ABNORMAL
LYMPHOCYTES # BLD AUTO: 0.87 K/UL — LOW (ref 1–3.3)
LYMPHOCYTES # BLD AUTO: 6 % — LOW (ref 13–44)
MCHC RBC-ENTMCNC: 28 PG — SIGNIFICANT CHANGE UP (ref 27–34)
MCHC RBC-ENTMCNC: 31.8 GM/DL — LOW (ref 32–36)
MCV RBC AUTO: 88.2 FL — SIGNIFICANT CHANGE UP (ref 80–100)
MONOCYTES # BLD AUTO: 0.58 K/UL — SIGNIFICANT CHANGE UP (ref 0–0.9)
MONOCYTES NFR BLD AUTO: 4 % — SIGNIFICANT CHANGE UP (ref 2–14)
NEUTROPHILS # BLD AUTO: 13.05 K/UL — HIGH (ref 1.8–7.4)
NEUTROPHILS NFR BLD AUTO: 82 % — HIGH (ref 43–77)
NITRITE UR-MCNC: POSITIVE
PH UR: 7 — SIGNIFICANT CHANGE UP (ref 5–8)
PLATELET # BLD AUTO: 445 K/UL — HIGH (ref 150–400)
POTASSIUM SERPL-MCNC: 4.2 MMOL/L — SIGNIFICANT CHANGE UP (ref 3.5–5.3)
POTASSIUM SERPL-SCNC: 4.2 MMOL/L — SIGNIFICANT CHANGE UP (ref 3.5–5.3)
PROT SERPL-MCNC: 5 G/DL — LOW (ref 6–8.3)
PROT UR-MCNC: 25 MG/DL
PROTHROM AB SERPL-ACNC: 13.3 SEC — HIGH (ref 9.8–12.7)
RBC # BLD: 2.89 M/UL — LOW (ref 3.8–5.2)
RBC # FLD: 20.4 % — HIGH (ref 10.3–14.5)
SODIUM SERPL-SCNC: 138 MMOL/L — SIGNIFICANT CHANGE UP (ref 135–145)
SP GR SPEC: 1 — LOW (ref 1.01–1.02)
UROBILINOGEN FLD QL: NEGATIVE — SIGNIFICANT CHANGE UP
WBC # BLD: 14.5 K/UL — HIGH (ref 3.8–10.5)
WBC # FLD AUTO: 14.5 K/UL — HIGH (ref 3.8–10.5)

## 2018-09-12 PROCEDURE — 72193 CT PELVIS W/DYE: CPT | Mod: 26

## 2018-09-12 PROCEDURE — 99285 EMERGENCY DEPT VISIT HI MDM: CPT

## 2018-09-12 PROCEDURE — 93010 ELECTROCARDIOGRAM REPORT: CPT

## 2018-09-12 PROCEDURE — 71045 X-RAY EXAM CHEST 1 VIEW: CPT | Mod: 26

## 2018-09-12 RX ORDER — COLLAGENASE CLOSTRIDIUM HIST. 250 UNIT/G
1 OINTMENT (GRAM) TOPICAL
Qty: 0 | Refills: 0 | COMMUNITY

## 2018-09-12 RX ORDER — PIPERACILLIN AND TAZOBACTAM 4; .5 G/20ML; G/20ML
3.38 INJECTION, POWDER, LYOPHILIZED, FOR SOLUTION INTRAVENOUS EVERY 8 HOURS
Qty: 0 | Refills: 0 | Status: DISCONTINUED | OUTPATIENT
Start: 2018-09-12 | End: 2018-09-13

## 2018-09-12 RX ORDER — PREGABALIN 225 MG/1
0 CAPSULE ORAL
Qty: 0 | Refills: 0 | COMMUNITY

## 2018-09-12 RX ORDER — SODIUM CHLORIDE 9 MG/ML
1000 INJECTION, SOLUTION INTRAVENOUS ONCE
Qty: 0 | Refills: 0 | Status: COMPLETED | OUTPATIENT
Start: 2018-09-12 | End: 2018-09-12

## 2018-09-12 RX ORDER — LACTOBACILLUS ACIDOPHILUS 100MM CELL
1 CAPSULE ORAL
Qty: 0 | Refills: 0 | COMMUNITY

## 2018-09-12 RX ORDER — SODIUM CHLORIDE 9 MG/ML
1000 INJECTION INTRAMUSCULAR; INTRAVENOUS; SUBCUTANEOUS
Qty: 0 | Refills: 0 | Status: DISCONTINUED | OUTPATIENT
Start: 2018-09-12 | End: 2018-09-13

## 2018-09-12 RX ORDER — RABEPRAZOLE 20 MG/1
2 TABLET, DELAYED RELEASE ORAL
Qty: 0 | Refills: 0 | COMMUNITY

## 2018-09-12 RX ORDER — LEVOTHYROXINE SODIUM 125 MCG
75 TABLET ORAL
Qty: 0 | Refills: 0 | COMMUNITY

## 2018-09-12 RX ORDER — PREGABALIN 225 MG/1
1 CAPSULE ORAL
Qty: 0 | Refills: 0 | COMMUNITY

## 2018-09-12 RX ORDER — ACETAMINOPHEN 500 MG
650 TABLET ORAL EVERY 6 HOURS
Qty: 0 | Refills: 0 | Status: DISCONTINUED | OUTPATIENT
Start: 2018-09-12 | End: 2018-09-13

## 2018-09-12 RX ORDER — VANCOMYCIN HCL 1 G
1000 VIAL (EA) INTRAVENOUS EVERY 24 HOURS
Qty: 0 | Refills: 0 | Status: DISCONTINUED | OUTPATIENT
Start: 2018-09-13 | End: 2018-09-13

## 2018-09-12 RX ORDER — PANTOPRAZOLE SODIUM 20 MG/1
40 TABLET, DELAYED RELEASE ORAL DAILY
Qty: 0 | Refills: 0 | Status: DISCONTINUED | OUTPATIENT
Start: 2018-09-12 | End: 2018-09-13

## 2018-09-12 RX ORDER — METOPROLOL TARTRATE 50 MG
25 TABLET ORAL DAILY
Qty: 0 | Refills: 0 | Status: DISCONTINUED | OUTPATIENT
Start: 2018-09-12 | End: 2018-09-13

## 2018-09-12 RX ORDER — HALOPERIDOL DECANOATE 100 MG/ML
2.5 INJECTION INTRAMUSCULAR ONCE
Qty: 0 | Refills: 0 | Status: COMPLETED | OUTPATIENT
Start: 2018-09-12 | End: 2018-09-12

## 2018-09-12 RX ORDER — ACETAMINOPHEN 500 MG
650 TABLET ORAL ONCE
Qty: 0 | Refills: 0 | Status: COMPLETED | OUTPATIENT
Start: 2018-09-12 | End: 2018-09-12

## 2018-09-12 RX ORDER — ZINC SULFATE TAB 220 MG (50 MG ZINC EQUIVALENT) 220 (50 ZN) MG
1 TAB ORAL
Qty: 0 | Refills: 0 | COMMUNITY

## 2018-09-12 RX ORDER — LEVOTHYROXINE SODIUM 125 MCG
75 TABLET ORAL DAILY
Qty: 0 | Refills: 0 | Status: DISCONTINUED | OUTPATIENT
Start: 2018-09-12 | End: 2018-09-13

## 2018-09-12 RX ORDER — DOCUSATE SODIUM 100 MG
100 CAPSULE ORAL THREE TIMES A DAY
Qty: 0 | Refills: 0 | Status: DISCONTINUED | OUTPATIENT
Start: 2018-09-12 | End: 2018-09-13

## 2018-09-12 RX ORDER — IPRATROPIUM/ALBUTEROL SULFATE 18-103MCG
1 AEROSOL WITH ADAPTER (GRAM) INHALATION
Qty: 0 | Refills: 0 | COMMUNITY

## 2018-09-12 RX ORDER — POVIDONE-IODINE 5 %
1 AEROSOL (ML) TOPICAL
Qty: 0 | Refills: 0 | COMMUNITY

## 2018-09-12 RX ORDER — CHOLECALCIFEROL (VITAMIN D3) 125 MCG
1 CAPSULE ORAL
Qty: 0 | Refills: 0 | COMMUNITY

## 2018-09-12 RX ORDER — VANCOMYCIN HCL 1 G
1250 VIAL (EA) INTRAVENOUS ONCE
Qty: 0 | Refills: 0 | Status: COMPLETED | OUTPATIENT
Start: 2018-09-12 | End: 2018-09-12

## 2018-09-12 RX ORDER — ENOXAPARIN SODIUM 100 MG/ML
30 INJECTION SUBCUTANEOUS DAILY
Qty: 0 | Refills: 0 | Status: DISCONTINUED | OUTPATIENT
Start: 2018-09-12 | End: 2018-09-13

## 2018-09-12 RX ORDER — PIPERACILLIN AND TAZOBACTAM 4; .5 G/20ML; G/20ML
3.38 INJECTION, POWDER, LYOPHILIZED, FOR SOLUTION INTRAVENOUS ONCE
Qty: 0 | Refills: 0 | Status: COMPLETED | OUTPATIENT
Start: 2018-09-12 | End: 2018-09-12

## 2018-09-12 RX ADMIN — PIPERACILLIN AND TAZOBACTAM 200 GRAM(S): 4; .5 INJECTION, POWDER, LYOPHILIZED, FOR SOLUTION INTRAVENOUS at 13:39

## 2018-09-12 RX ADMIN — SODIUM CHLORIDE 1000 MILLILITER(S): 9 INJECTION, SOLUTION INTRAVENOUS at 11:23

## 2018-09-12 RX ADMIN — Medication 650 MILLIGRAM(S): at 14:21

## 2018-09-12 RX ADMIN — HALOPERIDOL DECANOATE 2.5 MILLIGRAM(S): 100 INJECTION INTRAMUSCULAR at 17:32

## 2018-09-12 RX ADMIN — SODIUM CHLORIDE 50 MILLILITER(S): 9 INJECTION INTRAMUSCULAR; INTRAVENOUS; SUBCUTANEOUS at 21:49

## 2018-09-12 RX ADMIN — Medication 250 MILLIGRAM(S): at 11:36

## 2018-09-12 RX ADMIN — Medication 0.5 MILLIGRAM(S): at 14:24

## 2018-09-12 RX ADMIN — Medication 100 MILLIGRAM(S): at 22:01

## 2018-09-12 RX ADMIN — Medication 650 MILLIGRAM(S): at 12:23

## 2018-09-12 RX ADMIN — PIPERACILLIN AND TAZOBACTAM 25 GRAM(S): 4; .5 INJECTION, POWDER, LYOPHILIZED, FOR SOLUTION INTRAVENOUS at 22:01

## 2018-09-12 NOTE — PATIENT PROFILE ADULT. - USE OF SUPPLEMENTAL DRINKS
unable to ascertain this info from transfer packet sent with pt from [facility],pt confused.

## 2018-09-12 NOTE — ED ADULT NURSE NOTE - NSIMPLEMENTINTERV_GEN_ALL_ED
Implemented All Fall with Harm Risk Interventions:  [place] to call system. Call bell, personal items and telephone within reach. Instruct patient to call for assistance. Room bathroom lighting operational. Non-slip footwear when patient is off stretcher. Physically safe environment: no spills, clutter or unnecessary equipment. Stretcher in lowest position, wheels locked, appropriate side rails in place. Provide visual cue, wrist band, yellow gown, etc. Monitor gait and stability. Monitor for mental status changes and reorient to person, place, and time. Review medications for side effects contributing to fall risk. Reinforce activity limits and safety measures with patient and family. Provide visual clues: red socks.

## 2018-09-12 NOTE — PATIENT PROFILE ADULT. - SELF-CARE: EXERCISE REGULAR, PROFILE
unable to ascertain this info from transfer packet sent with pt from [facility],pt confused.

## 2018-09-12 NOTE — PATIENT PROFILE ADULT. - INFORMATION COULD NOT BE OBTAINED DETAILS
unable to ascertain this info from transfer packet sent with pt from [facility],pt confused.

## 2018-09-12 NOTE — PATIENT PROFILE ADULT. - HEALTHCARE QUESTIONS, PROFILE
unable to ascertain this info from transfer packet sent with pt from [facility],pt confused.

## 2018-09-12 NOTE — PATIENT PROFILE ADULT. - SOURCE OF INFORMATION, PROFILE
Copy Forward H&P form 9/12/2018, pt donna historian confused, info obtained from transfer packet sent with pt form [facility]/patient/chart(s)

## 2018-09-12 NOTE — INPATIENT CERTIFICATION FOR MEDICARE PATIENTS - THE STATUS OF COMORBIDITIES.
[facility] on [address]    Pt saw bm last week. Says he is much better except for the cough. Says he as been taking otc cough med but it is not working. Wants to know if he can get rx for cough? 2. The status of comorbities. (See ED/admit documents)

## 2018-09-12 NOTE — PATIENT PROFILE ADULT. - LAST BOWEL MOVEMENT
unable to ascertain this info from transfer packet sent with pt from [facility],pt confused.

## 2018-09-12 NOTE — PATIENT PROFILE ADULT. - HEALTH/HEALTHCARE ANXIETIES, PROFILE
unable to ascertain this info from transfer packet sent with pt from [facility]pt confused.

## 2018-09-12 NOTE — PATIENT PROFILE ADULT. - SPECIFY REASON UNABLE TO ASSESS:
unable to ascertain this info from transfer packet sent with pt from [facility],pt confused.

## 2018-09-12 NOTE — H&P ADULT - NSHPSOCIALHISTORY_GEN_ALL_CORE
, lives with dtr , non smoker , has dementia , and weakness and frailty and decline in condition and ADL

## 2018-09-12 NOTE — ED PROVIDER NOTE - OBJECTIVE STATEMENT
89 yo white female with H/O Dementia, CAD, HTN and Hyperlipidemia sent here from SNF for evaluation of draining wound to left buttock region as well as fever. No chills and no nausea, vomiting or diarrhea. No additional history available at this time.

## 2018-09-12 NOTE — H&P ADULT - NSHPLABSRESULTS_GEN_ALL_CORE
< from: CT Abdomen and Pelvis No Cont (08.23.18 @ 08:50) >    PROCEDURE DATE:  08/23/2018          INTERPRETATION:  Clinical information: GI bleeding    Comparison: None    PROCEDURE:     CT of the Abdomen and Pelvis was performed without intravenous contrast.    Evaluation of abdominal and pelvic viscera, lymph nodes and vasculature   is limited without intravenous contrast.    FINDINGS:    LOWER CHEST: Small bilateral pleural effusions. Bibasilar   atelectasis/pneumonia and endobronchial mucous. Moderate hiatal hernia   with distal esophageal fluid and 3. Correlate for aspiration.    ABDOMEN:  LIVER: within normal limits  BILE DUCTS: normal caliber  GALLBLADDER: no calcified gallstones. normal caliber wall  PANCREAS: within normal limits  SPLEEN: within normal limits  ADRENALS: within normal limits  KIDNEYS: within normal limits    PELVIS:  REPRODUCTIVE ORGANS: no pelvic masses  BLADDER: within normal limits    BOWEL: Sigmoid diverticulosis. No bowel obstruction.  PERITONEUM: no ascites or free air, no fluid collection  RETROPERITONEUM: no enlarged retroperitoneal or pelvic nodes.    VESSELS: Extensively atherosclerotic. Flattened inferior vena cava   suggesting volume depletion.  ABDOMINAL WALL: within normal limits.  MUSCULOSKELETAL: within normal limits.    IMPRESSION:     Sigmoid diverticulosis. Evaluation for active GI bleeding isn't possible   without contrast/CT angiogram.        < end of copied text >

## 2018-09-12 NOTE — PATIENT PROFILE ADULT. - ANESTHESIA, PREVIOUS REACTION, PROFILE
unable to ascertain this info from transfer packet sent with pt from [facility],pt confused.

## 2018-09-12 NOTE — CONSULT NOTE ADULT - ASSESSMENT
89 yo with multiple medical issues with buttock/back abscess.     Needs I and d. Due to size and pt's mental status, will not be able to get proper drainage bedside.        Emergency procedure     IV abx     NPO after midnight

## 2018-09-12 NOTE — H&P ADULT - ASSESSMENT
89 yo f , with hx of chronic a fib , not on anticoag due to falls , age and risk of bleeds and recent GI bleed requiring transfusion , advance dementia , hypertension , osteoarthritis osteoporosis   patient was at rehab post recent hospitalizations for uti , and gi  bleed , treated conservatively with transfusion , and in rehab was noted to have a draining buttock abscess , and admitted for possible I and d ,  patient seen by surgery and will probably have i and d if family agreeable , but aware of high risk due to age and advance dentia and a fib ,   she is frail and weak , and does not talk much , sleeps mostly and is bed ridden and wheel chair bound and has functional quadriplegia , 89 yo f , with hx of chronic a fib , not on anticoag due to falls , age and risk of bleeds and recent GI bleed requiring transfusion , advance dementia , hypertension , osteoarthritis osteoporosis   patient was at rehab post recent hospitalizations for uti , and gi  bleed , treated conservatively with transfusion , and in rehab was noted to have a draining buttock abscess , and admitted for possible I and d ,  patient seen by surgery and will probably have i and d if family agreeable , but aware of high risk due to age and advance dentia and a fib ,   she is frail and weak , and does not talk much , sleeps mostly and is bed ridden and wheel chair bound and has functional quadriplegia ,   discussed with surgery and dtr ,    patient in optimal condition for I and D  of left buttock

## 2018-09-12 NOTE — PATIENT PROFILE ADULT. - DO YOU AVOID
unable to ascertain this info from transfer packet sent with pt from [facility],pt confused.

## 2018-09-12 NOTE — PATIENT PROFILE ADULT. - NS PRO REGISTERED ORGAN DONOR
unable to ascertain this info from transfer packet sent with pt from [facility],pt confused.

## 2018-09-12 NOTE — PATIENT PROFILE ADULT. - FALLEN IN THE PAST
unable to ascertain this info from transfer packet sent with pt from [facility],pt confused./yes

## 2018-09-12 NOTE — ED ADULT NURSE NOTE - CHIEF COMPLAINT QUOTE
pt sent from [facility] for wound to buttocks that is foul smelling with drainage, as per ems needs surgical consult, pt also has wound to left side of forehead that has sutures in place from 10 days ago, site is red and swollen

## 2018-09-12 NOTE — PATIENT PROFILE ADULT. - SELF-CARE: EQUIPMENT USED AT HOME, PROFILE
unable to ascertain this info from transfer packet sent with pt from [facility],pt confused.

## 2018-09-12 NOTE — ED PROVIDER NOTE - CROS ED SKIN ALL NEG
Josue Clinton is a 76 year old male presenting for neurological evaluation for memory loss.     Referred by: Aki Michele MD    Patient denies known allergies/sensitivity to Latex.  Medication list verified with patient's assistance.  Tobacco history verified with patient      - - -

## 2018-09-12 NOTE — PATIENT PROFILE ADULT. - MEDICATION, CURRENT PHARMACY, PROFILE
unable to ascertain this info from transfer packet sent with pt from [facility],pt confused.

## 2018-09-12 NOTE — ED ADULT TRIAGE NOTE - CHIEF COMPLAINT QUOTE
pt sent from [facility] for wound to buttocks that is foul smelling with drainage, as per ems needs surgical consult, pt also has wound to left side of forehead that has sutures in place from 10 days ago, site is red and swollen

## 2018-09-12 NOTE — ED ADULT NURSE NOTE - OBJECTIVE STATEMENT
pt sent from [facility] for "foul smelling" wound on left buttock. pt denies any pain. " I want to go home". pt cooperative. wound is open and draining, with hardness around the wound. left buttock with area of redness around the wound.

## 2018-09-12 NOTE — PATIENT PROFILE ADULT. - GENERAL HEALTH, PREVIOUS, PROFILE
unable to ascertain this info from transfer packet sent with pt from [facility],pt confused.

## 2018-09-12 NOTE — PATIENT PROFILE ADULT. - NSSUBSTANCEUSE_GEN_ALL_CORE_SD
pt confused,unable to ascertain this info from transfer packet sent with pt from [facility],pt confused.

## 2018-09-12 NOTE — PATIENT PROFILE ADULT. - HAVE YOU BEEN ASKED TO AVOID
unable to ascertain this info from transfer packet sent with pt from [facility],pt confused.

## 2018-09-12 NOTE — CONSULT NOTE ADULT - SUBJECTIVE AND OBJECTIVE BOX
hx obtained from chart due to pt's mental status     89 yo white female with H/O Dementia, CAD, HTN and Hyperlipidemia sent here from SNF for evaluation of draining wound to left buttock region as well as fever. No chills and no nausea, vomiting or diarrhea. As per medical  Was seen at NH where she was started on levaquin a few days ago. PT wound continue to worsen with drainage of pus.  Ask to see for eval    ROS-unable to obtain    PAST MEDICAL & SURGICAL HISTORY:  CAD (coronary artery disease)  TIA (transient ischemic attack)  GERD (gastroesophageal reflux disease)  Damian esophagus  Arthritis  Hypertension  High cholesterol  Dementia  Hypothyroid  Low sodium levels  TIA (transient ischemic attack)  Arthritis  Hypercholesterolemia  HTN - Hypertension  GERD (gastroesophageal reflux disease)  Damian esophagus  Cataract  S/P cholecystectomy  Cataract  S/P laparoscopic cholecystectomy  H/O carotid endarterectomy  H/O: : x4    Home Medications:  acetaminophen 325 mg oral tablet: 2 tab(s) orally every 8 hours, As Needed -pain/ back (26 Oct 2017 09:37)  docusate sodium 100 mg oral capsule: 1 cap(s) orally 3 times a day (25 Aug 2018 12:33)  ferrous sulfate 300 mg/5 mL (60 mg elemental iron) oral liquid: 5 milliliter(s) orally 2 times a day (with meals) (25 Aug 2018 12:33)  levothyroxine: 75 milligram(s) orally once a day (26 Oct 2017 09:37)  Multiple Vitamins with Minerals oral tablet: 1 tab(s) orally once a day (25 Aug 2018 12:33)  RABEprazole 20 mg oral tablet, extended release: 2 tab(s) orally once a day (26 Oct 2017 09:37)  Vitamin B12:  (16 Aug 2018 07:26)    Allergies    No Known Allergies    Intolerances    SH-unable to obtain        .  VITAL SIGNS:  T(C): 36.6 (18 @ 15:28), Max: 38.5 (18 @ 10:59)  T(F): 97.8 (18 @ 15:28), Max: 101.3 (18 @ 10:59)  HR: 87 (18 @ 15:28) (87 - 87)  BP: 107/69 (18 @ 15:28) (107/69 - 123/64)  BP(mean): --  RR: 17 (18 @ 15:28) (16 - 17)  SpO2: 96% (18 @ 15:28) (96% - 96%)  Wt(kg): --    PHYSICAL EXAM:    Constitutional:  confused  Head: NC/AT  Eyes: PERRL b/l  ENT: MMM  Neck: supple; no JVD or thyromegaly  Respiratory: CTA B/L   Cardiac: +S1/S2; RRR   Gastrointestinal: soft, NT/ND; no rebound or guarding; + BS  Back:lower back, upper buttock, fluctuance, erythematous area with purulent drainage, 2 fibrinous areas  Extremities: WWP, no clubbing or cyanosis; no peripheral edema                          8.1    14.50 )-----------( 445      ( 12 Sep 2018 11:47 )             25.5       138  |  106  |  33<H>  ----------------------------<  142<H>  4.2   |  23  |  1.30    Ca    7.7<L>      12 Sep 2018 13:06    TPro  5.0<L>  /  Alb  1.4<L>  /  TBili  0.4  /  DBili  x   /  AST  18  /  ALT  20  /  AlkPhos  104      CT- pending

## 2018-09-12 NOTE — H&P ADULT - HISTORY OF PRESENT ILLNESS
89 yo f , with hx of chronic a fib , not on anticoag due to falls , age and risk of bleeds and recent GI bleed requiring transfusion , advance dementia , hypertension , osteoarthritis osteoporosis   patient was at rehab post recent hospitalizations for uti , and gi  bleed , treated conservatively with transfusion , and in rehab was noted to have a draining buttock abscess , and admitted for possible I and d ,  patient seen by surgery and will probably have i and d if family agreeable , but aware of high risk due to age and advance dentia and a fib ,   she is frail and weak , and does not talk much , sleeps mostly and is bed ridden and wheel chair bound and has functional quadriplegia ,

## 2018-09-12 NOTE — PATIENT PROFILE ADULT. - HEALING PATTERN, PREVIOUS, PROFILE
unable to ascertain this info from transfer packet sent with pt from [facility],pt confused.

## 2018-09-12 NOTE — PATIENT PROFILE ADULT. - PRIMARY CARE PHYSICIAN, PROFILE
unable to ascertain this info from transfer packet sent with pt from [facility],pt confused.

## 2018-09-12 NOTE — PATIENT PROFILE ADULT. - BLOOD AVOIDANCE/RESTRICTIONS, PROFILE
unable to ascertain this info from transfer packet sent with pt from [facility],pt confused.

## 2018-09-12 NOTE — PATIENT PROFILE ADULT. - BLOOD TRANSFUSION, PREVIOUS, PROFILE
unable to ascertain this info from transfer packet sent with pt from [facility],pt confused.

## 2018-09-13 LAB
-  COAGULASE NEGATIVE STAPHYLOCOCCUS: SIGNIFICANT CHANGE UP
ANISOCYTOSIS BLD QL: SLIGHT — SIGNIFICANT CHANGE UP
BASOPHILS # BLD AUTO: 0 K/UL — SIGNIFICANT CHANGE UP (ref 0–0.2)
BASOPHILS NFR BLD AUTO: 0 % — SIGNIFICANT CHANGE UP (ref 0–2)
BLD GP AB SCN SERPL QL: SIGNIFICANT CHANGE UP
CRP SERPL-MCNC: 14.28 MG/DL — HIGH (ref 0–0.4)
EOSINOPHIL # BLD AUTO: 0.24 K/UL — SIGNIFICANT CHANGE UP (ref 0–0.5)
EOSINOPHIL NFR BLD AUTO: 2 % — SIGNIFICANT CHANGE UP (ref 0–6)
GRAM STN FLD: SIGNIFICANT CHANGE UP
HCT VFR BLD CALC: 20.7 % — CRITICAL LOW (ref 34.5–45)
HGB BLD-MCNC: 6.7 G/DL — CRITICAL LOW (ref 11.5–15.5)
HYPOCHROMIA BLD QL: SLIGHT — SIGNIFICANT CHANGE UP
LYMPHOCYTES # BLD AUTO: 1.77 K/UL — SIGNIFICANT CHANGE UP (ref 1–3.3)
LYMPHOCYTES # BLD AUTO: 15 % — SIGNIFICANT CHANGE UP (ref 13–44)
MANUAL SMEAR VERIFICATION: SIGNIFICANT CHANGE UP
MCHC RBC-ENTMCNC: 27.9 PG — SIGNIFICANT CHANGE UP (ref 27–34)
MCHC RBC-ENTMCNC: 32.4 GM/DL — SIGNIFICANT CHANGE UP (ref 32–36)
MCV RBC AUTO: 86.3 FL — SIGNIFICANT CHANGE UP (ref 80–100)
METHOD TYPE: SIGNIFICANT CHANGE UP
MONOCYTES # BLD AUTO: 0.24 K/UL — SIGNIFICANT CHANGE UP (ref 0–0.9)
MONOCYTES NFR BLD AUTO: 2 % — SIGNIFICANT CHANGE UP (ref 2–14)
NEUTROPHILS # BLD AUTO: 9.57 K/UL — HIGH (ref 1.8–7.4)
NEUTROPHILS NFR BLD AUTO: 81 % — HIGH (ref 43–77)
NRBC # BLD: 0 — SIGNIFICANT CHANGE UP
NRBC # BLD: SIGNIFICANT CHANGE UP /100 WBCS (ref 0–0)
PLAT MORPH BLD: NORMAL — SIGNIFICANT CHANGE UP
PLATELET # BLD AUTO: 398 K/UL — SIGNIFICANT CHANGE UP (ref 150–400)
RBC # BLD: 2.4 M/UL — LOW (ref 3.8–5.2)
RBC # FLD: 20 % — HIGH (ref 10.3–14.5)
RBC BLD AUTO: SIGNIFICANT CHANGE UP
WBC # BLD: 11.82 K/UL — HIGH (ref 3.8–10.5)
WBC # FLD AUTO: 11.82 K/UL — HIGH (ref 3.8–10.5)

## 2018-09-13 PROCEDURE — ZZZZZ: CPT

## 2018-09-13 RX ORDER — METOPROLOL TARTRATE 50 MG
25 TABLET ORAL DAILY
Qty: 0 | Refills: 0 | Status: DISCONTINUED | OUTPATIENT
Start: 2018-09-13 | End: 2018-09-19

## 2018-09-13 RX ORDER — LEVOTHYROXINE SODIUM 125 MCG
75 TABLET ORAL DAILY
Qty: 0 | Refills: 0 | Status: DISCONTINUED | OUTPATIENT
Start: 2018-09-13 | End: 2018-09-19

## 2018-09-13 RX ORDER — PANTOPRAZOLE SODIUM 20 MG/1
40 TABLET, DELAYED RELEASE ORAL DAILY
Qty: 0 | Refills: 0 | Status: DISCONTINUED | OUTPATIENT
Start: 2018-09-13 | End: 2018-09-14

## 2018-09-13 RX ORDER — DOCUSATE SODIUM 100 MG
100 CAPSULE ORAL THREE TIMES A DAY
Qty: 0 | Refills: 0 | Status: DISCONTINUED | OUTPATIENT
Start: 2018-09-13 | End: 2018-09-19

## 2018-09-13 RX ORDER — ENOXAPARIN SODIUM 100 MG/ML
30 INJECTION SUBCUTANEOUS DAILY
Qty: 0 | Refills: 0 | Status: DISCONTINUED | OUTPATIENT
Start: 2018-09-14 | End: 2018-09-19

## 2018-09-13 RX ORDER — SODIUM CHLORIDE 9 MG/ML
1000 INJECTION INTRAMUSCULAR; INTRAVENOUS; SUBCUTANEOUS
Qty: 0 | Refills: 0 | Status: DISCONTINUED | OUTPATIENT
Start: 2018-09-13 | End: 2018-09-14

## 2018-09-13 RX ORDER — ACETAMINOPHEN 500 MG
650 TABLET ORAL EVERY 6 HOURS
Qty: 0 | Refills: 0 | Status: DISCONTINUED | OUTPATIENT
Start: 2018-09-13 | End: 2018-09-19

## 2018-09-13 RX ORDER — HALOPERIDOL DECANOATE 100 MG/ML
2 INJECTION INTRAMUSCULAR ONCE
Qty: 0 | Refills: 0 | Status: DISCONTINUED | OUTPATIENT
Start: 2018-09-13 | End: 2018-09-13

## 2018-09-13 RX ORDER — SODIUM CHLORIDE 9 MG/ML
1000 INJECTION, SOLUTION INTRAVENOUS
Qty: 0 | Refills: 0 | Status: DISCONTINUED | OUTPATIENT
Start: 2018-09-13 | End: 2018-09-13

## 2018-09-13 RX ADMIN — SODIUM CHLORIDE 50 MILLILITER(S): 9 INJECTION INTRAMUSCULAR; INTRAVENOUS; SUBCUTANEOUS at 21:47

## 2018-09-13 RX ADMIN — PIPERACILLIN AND TAZOBACTAM 25 GRAM(S): 4; .5 INJECTION, POWDER, LYOPHILIZED, FOR SOLUTION INTRAVENOUS at 05:37

## 2018-09-13 RX ADMIN — Medication 25 MILLIGRAM(S): at 05:38

## 2018-09-13 RX ADMIN — Medication 100 MILLIGRAM(S): at 05:38

## 2018-09-13 RX ADMIN — Medication 75 MICROGRAM(S): at 05:38

## 2018-09-13 RX ADMIN — Medication 250 MILLIGRAM(S): at 14:04

## 2018-09-13 NOTE — PROGRESS NOTE ADULT - ASSESSMENT
89 yo f , with hx of chronic a fib , not on anticoag due to falls , age and risk of bleeds and recent GI bleed requiring transfusion , advance dementia , hypertension , osteoarthritis osteoporosis   patient was at rehab post recent hospitalizations for uti , and gi  bleed , treated conservatively with transfusion , and in rehab was noted to have a draining buttock abscess , and admitted for possible I and d ,  patient seen by surgery and will probably have i and d if family agreeable , but aware of high risk due to age and advance dentia and a fib ,   she is frail and weak , and does not talk much , sleeps mostly and is bed ridden and wheel chair bound and has functional quadriplegia ,   discussed with surgery and dtr ,    patient in optimal condition for I and D  of left buttock  ct buttock noted , awaiting i and d of abscess , anemic again , transfuse 2 units , Iv abx , dnr dni , d/w dtr and surgeon ,  dtr aware that patient elderly and with multiple medical issues and poor nutrition and high risk for decubes , and poor wound heeling ,

## 2018-09-14 LAB
-  AMIKACIN: SIGNIFICANT CHANGE UP
-  AMOXICILLIN/CLAVULANIC ACID: SIGNIFICANT CHANGE UP
-  AMPICILLIN/SULBACTAM: SIGNIFICANT CHANGE UP
-  AMPICILLIN: SIGNIFICANT CHANGE UP
-  AZTREONAM: SIGNIFICANT CHANGE UP
-  CEFAZOLIN: SIGNIFICANT CHANGE UP
-  CEFEPIME: SIGNIFICANT CHANGE UP
-  CEFOTAXIME: SIGNIFICANT CHANGE UP
-  CEFOXITIN: SIGNIFICANT CHANGE UP
-  CEFTAZIDIME: SIGNIFICANT CHANGE UP
-  CEFTRIAXONE: SIGNIFICANT CHANGE UP
-  CEFUROXIME: SIGNIFICANT CHANGE UP
-  CEPHALOTHIN: SIGNIFICANT CHANGE UP
-  CIPROFLOXACIN: SIGNIFICANT CHANGE UP
-  ERTAPENEM: SIGNIFICANT CHANGE UP
-  GENTAMICIN: SIGNIFICANT CHANGE UP
-  IMIPENEM: SIGNIFICANT CHANGE UP
-  LEVOFLOXACIN: SIGNIFICANT CHANGE UP
-  MEROPENEM: SIGNIFICANT CHANGE UP
-  NITROFURANTOIN: SIGNIFICANT CHANGE UP
-  PIPERACILLIN/TAZOBACTAM: SIGNIFICANT CHANGE UP
-  TETRACYCLINE: SIGNIFICANT CHANGE UP
-  TIGECYCLINE: SIGNIFICANT CHANGE UP
-  TOBRAMYCIN: SIGNIFICANT CHANGE UP
-  TRIMETHOPRIM/SULFAMETHOXAZOLE: SIGNIFICANT CHANGE UP
ANION GAP SERPL CALC-SCNC: 9 MMOL/L — SIGNIFICANT CHANGE UP (ref 5–17)
BASOPHILS # BLD AUTO: 0.03 K/UL — SIGNIFICANT CHANGE UP (ref 0–0.2)
BASOPHILS NFR BLD AUTO: 0.3 % — SIGNIFICANT CHANGE UP (ref 0–2)
BUN SERPL-MCNC: 24 MG/DL — HIGH (ref 7–23)
CALCIUM SERPL-MCNC: 7.8 MG/DL — LOW (ref 8.5–10.1)
CHLORIDE SERPL-SCNC: 107 MMOL/L — SIGNIFICANT CHANGE UP (ref 96–108)
CO2 SERPL-SCNC: 22 MMOL/L — SIGNIFICANT CHANGE UP (ref 22–31)
CREAT SERPL-MCNC: 1.1 MG/DL — SIGNIFICANT CHANGE UP (ref 0.5–1.3)
CULTURE RESULTS: SIGNIFICANT CHANGE UP
EOSINOPHIL # BLD AUTO: 0.04 K/UL — SIGNIFICANT CHANGE UP (ref 0–0.5)
EOSINOPHIL NFR BLD AUTO: 0.4 % — SIGNIFICANT CHANGE UP (ref 0–6)
GLUCOSE SERPL-MCNC: 84 MG/DL — SIGNIFICANT CHANGE UP (ref 70–99)
GRAM STN FLD: SIGNIFICANT CHANGE UP
HCT VFR BLD CALC: 30.1 % — LOW (ref 34.5–45)
HGB BLD-MCNC: 9.8 G/DL — LOW (ref 11.5–15.5)
IMM GRANULOCYTES NFR BLD AUTO: 0.6 % — SIGNIFICANT CHANGE UP (ref 0–1.5)
LYMPHOCYTES # BLD AUTO: 1.24 K/UL — SIGNIFICANT CHANGE UP (ref 1–3.3)
LYMPHOCYTES # BLD AUTO: 13.1 % — SIGNIFICANT CHANGE UP (ref 13–44)
MCHC RBC-ENTMCNC: 27.9 PG — SIGNIFICANT CHANGE UP (ref 27–34)
MCHC RBC-ENTMCNC: 32.6 GM/DL — SIGNIFICANT CHANGE UP (ref 32–36)
MCV RBC AUTO: 85.8 FL — SIGNIFICANT CHANGE UP (ref 80–100)
METHOD TYPE: SIGNIFICANT CHANGE UP
MONOCYTES # BLD AUTO: 0.36 K/UL — SIGNIFICANT CHANGE UP (ref 0–0.9)
MONOCYTES NFR BLD AUTO: 3.8 % — SIGNIFICANT CHANGE UP (ref 2–14)
NEUTROPHILS # BLD AUTO: 7.74 K/UL — HIGH (ref 1.8–7.4)
NEUTROPHILS NFR BLD AUTO: 81.8 % — HIGH (ref 43–77)
NRBC # BLD: 0 /100 WBCS — SIGNIFICANT CHANGE UP (ref 0–0)
ORGANISM # SPEC MICROSCOPIC CNT: SIGNIFICANT CHANGE UP
ORGANISM # SPEC MICROSCOPIC CNT: SIGNIFICANT CHANGE UP
PLATELET # BLD AUTO: 421 K/UL — HIGH (ref 150–400)
POTASSIUM SERPL-MCNC: 3.8 MMOL/L — SIGNIFICANT CHANGE UP (ref 3.5–5.3)
POTASSIUM SERPL-SCNC: 3.8 MMOL/L — SIGNIFICANT CHANGE UP (ref 3.5–5.3)
RBC # BLD: 3.51 M/UL — LOW (ref 3.8–5.2)
RBC # FLD: 18.5 % — HIGH (ref 10.3–14.5)
SODIUM SERPL-SCNC: 138 MMOL/L — SIGNIFICANT CHANGE UP (ref 135–145)
SPECIMEN SOURCE: SIGNIFICANT CHANGE UP
WBC # BLD: 9.47 K/UL — SIGNIFICANT CHANGE UP (ref 3.8–10.5)
WBC # FLD AUTO: 9.47 K/UL — SIGNIFICANT CHANGE UP (ref 3.8–10.5)

## 2018-09-14 RX ORDER — MORPHINE SULFATE 50 MG/1
2 CAPSULE, EXTENDED RELEASE ORAL ONCE
Qty: 0 | Refills: 0 | Status: DISCONTINUED | OUTPATIENT
Start: 2018-09-14 | End: 2018-09-14

## 2018-09-14 RX ORDER — PIPERACILLIN AND TAZOBACTAM 4; .5 G/20ML; G/20ML
3.38 INJECTION, POWDER, LYOPHILIZED, FOR SOLUTION INTRAVENOUS EVERY 8 HOURS
Qty: 0 | Refills: 0 | Status: DISCONTINUED | OUTPATIENT
Start: 2018-09-14 | End: 2018-09-14

## 2018-09-14 RX ORDER — ASCORBIC ACID 60 MG
500 TABLET,CHEWABLE ORAL DAILY
Qty: 0 | Refills: 0 | Status: DISCONTINUED | OUTPATIENT
Start: 2018-09-14 | End: 2018-09-19

## 2018-09-14 RX ORDER — ZINC SULFATE TAB 220 MG (50 MG ZINC EQUIVALENT) 220 (50 ZN) MG
220 TAB ORAL
Qty: 0 | Refills: 0 | Status: DISCONTINUED | OUTPATIENT
Start: 2018-09-14 | End: 2018-09-19

## 2018-09-14 RX ORDER — LACTOBACILLUS ACIDOPHILUS 100MM CELL
1 CAPSULE ORAL
Qty: 0 | Refills: 0 | Status: DISCONTINUED | OUTPATIENT
Start: 2018-09-14 | End: 2018-09-19

## 2018-09-14 RX ORDER — VANCOMYCIN HCL 1 G
1000 VIAL (EA) INTRAVENOUS EVERY 24 HOURS
Qty: 0 | Refills: 0 | Status: DISCONTINUED | OUTPATIENT
Start: 2018-09-14 | End: 2018-09-14

## 2018-09-14 RX ORDER — BACITRACIN ZINC 500 UNIT/G
1 OINTMENT IN PACKET (EA) TOPICAL THREE TIMES A DAY
Qty: 0 | Refills: 0 | Status: DISCONTINUED | OUTPATIENT
Start: 2018-09-14 | End: 2018-09-19

## 2018-09-14 RX ORDER — PANTOPRAZOLE SODIUM 20 MG/1
40 TABLET, DELAYED RELEASE ORAL
Qty: 0 | Refills: 0 | Status: DISCONTINUED | OUTPATIENT
Start: 2018-09-14 | End: 2018-09-19

## 2018-09-14 RX ORDER — LINEZOLID 600 MG/300ML
600 INJECTION, SOLUTION INTRAVENOUS EVERY 12 HOURS
Qty: 0 | Refills: 0 | Status: DISCONTINUED | OUTPATIENT
Start: 2018-09-14 | End: 2018-09-19

## 2018-09-14 RX ORDER — INFLUENZA VIRUS VACCINE 15; 15; 15; 15 UG/.5ML; UG/.5ML; UG/.5ML; UG/.5ML
0.5 SUSPENSION INTRAMUSCULAR ONCE
Qty: 0 | Refills: 0 | Status: COMPLETED | OUTPATIENT
Start: 2018-09-14 | End: 2018-09-14

## 2018-09-14 RX ADMIN — LINEZOLID 600 MILLIGRAM(S): 600 INJECTION, SOLUTION INTRAVENOUS at 17:38

## 2018-09-14 RX ADMIN — Medication 500 MILLIGRAM(S): at 11:35

## 2018-09-14 RX ADMIN — Medication 25 MILLIGRAM(S): at 05:46

## 2018-09-14 RX ADMIN — MORPHINE SULFATE 2 MILLIGRAM(S): 50 CAPSULE, EXTENDED RELEASE ORAL at 09:15

## 2018-09-14 RX ADMIN — Medication 1 TABLET(S): at 17:38

## 2018-09-14 RX ADMIN — Medication 1 APPLICATION(S): at 21:28

## 2018-09-14 RX ADMIN — Medication 100 MILLIGRAM(S): at 21:28

## 2018-09-14 RX ADMIN — Medication 1 TABLET(S): at 11:35

## 2018-09-14 RX ADMIN — ZINC SULFATE TAB 220 MG (50 MG ZINC EQUIVALENT) 220 MILLIGRAM(S): 220 (50 ZN) TAB at 17:38

## 2018-09-14 RX ADMIN — Medication 1 APPLICATION(S): at 11:35

## 2018-09-14 RX ADMIN — ENOXAPARIN SODIUM 30 MILLIGRAM(S): 100 INJECTION SUBCUTANEOUS at 11:35

## 2018-09-14 RX ADMIN — Medication 75 MICROGRAM(S): at 05:46

## 2018-09-14 RX ADMIN — MORPHINE SULFATE 2 MILLIGRAM(S): 50 CAPSULE, EXTENDED RELEASE ORAL at 08:31

## 2018-09-14 RX ADMIN — Medication 100 MILLIGRAM(S): at 16:41

## 2018-09-14 RX ADMIN — Medication 100 MILLIGRAM(S): at 05:46

## 2018-09-14 RX ADMIN — PANTOPRAZOLE SODIUM 40 MILLIGRAM(S): 20 TABLET, DELAYED RELEASE ORAL at 17:38

## 2018-09-14 NOTE — SWALLOW BEDSIDE ASSESSMENT ADULT - PHARYNGEAL PHASE
Cough post oral intake/Decreased laryngeal elevation/Wet vocal quality post oral intake/Delayed pharyngeal swallow Decreased laryngeal elevation/Delayed pharyngeal swallow Delayed pharyngeal swallow/Decreased laryngeal elevation

## 2018-09-14 NOTE — PHYSICAL THERAPY INITIAL EVALUATION ADULT - ADDITIONAL COMMENTS
Information obtained from pt's daughter via phone (pt is a poor historian)- pt was at [facility] and family prefer return to same facility

## 2018-09-14 NOTE — PROGRESS NOTE ADULT - ASSESSMENT
89 yo f , with hx of chronic a fib , not on anticoag due to falls , age and risk of bleeds and recent GI bleed requiring transfusion , advance dementia , hypertension , osteoarthritis osteoporosis   patient was at rehab post recent hospitalizations for uti , and gi  bleed , treated conservatively with transfusion , and in rehab was noted to have a draining buttock abscess , and admitted for possible I and d ,  patient seen by surgery and will probably have i and d if family agreeable , but aware of high risk due to age and advance dentia and a fib ,   she is frail and weak , and does not talk much , sleeps mostly and is bed ridden and wheel chair bound and has functional quadriplegia ,   discussed with surgery and dtr ,    patient in optimal condition for I and D  of left buttock  ct buttock noted , awaiting i and d of abscess , anemic again , transfuse 2 units , Iv abx , dnr dni , d/w dtr and surgeon ,  dtr aware that patient elderly and with multiple medical issues and poor nutrition and high risk for decubes , and poor wound heeling ,   patient post I and D left buttock abcsess , and has packing , needs abx but she keeps pulling iv line , post 2 units bloods , will try po zyvox , and probiotics , and dc all iv meds ,   case d/w dtr , also provide surgical wound care , , advance diet , overall prognosis is poor ,   there is behavioral issues due to dementia will start aricept

## 2018-09-14 NOTE — SWALLOW BEDSIDE ASSESSMENT ADULT - ORAL PHASE
Decreased anterior-posterior movement of the bolus Decreased anterior-posterior movement of the bolus/Delayed oral transit time

## 2018-09-14 NOTE — SWALLOW BEDSIDE ASSESSMENT ADULT - ASR SWALLOW ASPIRATION MONITOR
fever/pneumonia/throat clearing/cough/gurgly voice/upper respiratory infection/change of breathing pattern

## 2018-09-14 NOTE — SWALLOW BEDSIDE ASSESSMENT ADULT - COMMENTS
Pt alert, oriented to person, cooperative. Pt admitted with cellulitis and abscess. Pt with recent hospitalizations for GI bleed and UTI. PMH significant for Barretts' esophagus and TIA. Pt known to department from previous admissions when she was consuming a mechanical soft diet with nectar thickened liquids. Pt presents with pharyngeal, suspected esophageal dysphagia characterized by adequate oral prep, sufficient mastication, latent AP transport, swallow delay, double swallows, reduced laryngeal elevation. Overt s/s of aspiration noted for thin liquids.  Recommend continue dysphagia 2 mechanical soft consistencies with nectar thickened liquids. Discussed with RN, Dietician.

## 2018-09-14 NOTE — CHART NOTE - NSCHARTNOTEFT_GEN_A_CORE
Called by RN to remove patient's sutures (unknown amount). Sutures were placed at [facility] for a Left forehead laceration prior to this admission. Patient seen and examined at bedside. Several sutures noted but mostly covered by eschar. Upon pulling at suture ends to free sutures, purulent fluid noted, drained with gauze and manual pressure. Single suture removed. Upon further traction, further purulent fluid noted, again drained with gauze and manual pressure. Given location and purulent fluid, sutures do not appear ready to be removed at this time. RN to reach out to Dr. Escobedo for re-assessment and further evaluation prior to further intervention. Will administer Bacitracin topical for now. Will continue to monitor, RN to call if any changes.

## 2018-09-14 NOTE — SWALLOW BEDSIDE ASSESSMENT ADULT - SWALLOW EVAL: RECOMMENDED FEEDING/EATING TECHNIQUES
crush medication (when feasible)/position upright (90 degrees)/maintain upright posture during/after eating for 30 mins/allow for swallow between intakes/small sips/bites

## 2018-09-14 NOTE — DIETITIAN INITIAL EVALUATION ADULT. - FACTORS AFF FOOD INTAKE
"This lady has COPD with hypoxemia.  She remained short of breath with cough and wheeze.  She still smoking cigarettes    ROS    Constitutional-no night sweats weight loss headaches  GI no abdominal pain nausea or diarrhea  Neuro no seizure or neurologic deficits  Musculoskeletal no deformity or joint pain   no dysuria or hematuria  Skin no rash or other lesions  All other systems reviewed and were negative except for the above.      Physical Exam  /76 (BP Location: Left arm, Patient Position: Sitting, Cuff Size: Adult)   Pulse 69   Ht 157.5 cm (62\")   Wt 50.5 kg (111 lb 6.4 oz)   SpO2 93%   BMI 20.38 kg/m²   Vital signs as above  Pupils equally round and reactive to light and accommodation, neck no JVD or adenopathy.  Cardiovascular regular rhythm and rate no murmur or gallop.  Abdomen soft no organomegaly tenderness.  Extremities no clubbing cyanosis or edema.  No cervical adenopathy.  No skin rash.  Neurologic good strength bilaterally without deficits  Lungs reveal diminished breath sounds and prolonged expiration    Impression COPD with hypoxemia    Plan continue present medications, refrain from smoking, return in 6 months          This document has been electronically signed by Sang Marcos MD on May 1, 2018 1:35 PM      " difficulty swallowing/seen by SLP/change in mental status

## 2018-09-15 DIAGNOSIS — F03.90 UNSPECIFIED DEMENTIA WITHOUT BEHAVIORAL DISTURBANCE: ICD-10-CM

## 2018-09-15 LAB
-  AMPICILLIN/SULBACTAM: SIGNIFICANT CHANGE UP
-  CEFAZOLIN: SIGNIFICANT CHANGE UP
-  CLINDAMYCIN: SIGNIFICANT CHANGE UP
-  DAPTOMYCIN: SIGNIFICANT CHANGE UP
-  DAPTOMYCIN: SIGNIFICANT CHANGE UP
-  ERYTHROMYCIN: SIGNIFICANT CHANGE UP
-  GENTAMICIN: SIGNIFICANT CHANGE UP
-  LINEZOLID: SIGNIFICANT CHANGE UP
-  LINEZOLID: SIGNIFICANT CHANGE UP
-  OXACILLIN: SIGNIFICANT CHANGE UP
-  PENICILLIN: SIGNIFICANT CHANGE UP
-  RIFAMPIN: SIGNIFICANT CHANGE UP
-  TETRACYCLINE: SIGNIFICANT CHANGE UP
-  TRIMETHOPRIM/SULFAMETHOXAZOLE: SIGNIFICANT CHANGE UP
-  VANCOMYCIN: SIGNIFICANT CHANGE UP
ANION GAP SERPL CALC-SCNC: 8 MMOL/L — SIGNIFICANT CHANGE UP (ref 5–17)
BASOPHILS # BLD AUTO: 0.02 K/UL — SIGNIFICANT CHANGE UP (ref 0–0.2)
BASOPHILS NFR BLD AUTO: 0.3 % — SIGNIFICANT CHANGE UP (ref 0–2)
BUN SERPL-MCNC: 24 MG/DL — HIGH (ref 7–23)
CALCIUM SERPL-MCNC: 7.6 MG/DL — LOW (ref 8.5–10.1)
CHLORIDE SERPL-SCNC: 108 MMOL/L — SIGNIFICANT CHANGE UP (ref 96–108)
CO2 SERPL-SCNC: 22 MMOL/L — SIGNIFICANT CHANGE UP (ref 22–31)
CREAT SERPL-MCNC: 1.2 MG/DL — SIGNIFICANT CHANGE UP (ref 0.5–1.3)
CULTURE RESULTS: SIGNIFICANT CHANGE UP
EOSINOPHIL # BLD AUTO: 0.04 K/UL — SIGNIFICANT CHANGE UP (ref 0–0.5)
EOSINOPHIL NFR BLD AUTO: 0.6 % — SIGNIFICANT CHANGE UP (ref 0–6)
GLUCOSE SERPL-MCNC: 87 MG/DL — SIGNIFICANT CHANGE UP (ref 70–99)
GRAM STN FLD: SIGNIFICANT CHANGE UP
HCT VFR BLD CALC: 27.7 % — LOW (ref 34.5–45)
HGB BLD-MCNC: 8.9 G/DL — LOW (ref 11.5–15.5)
IMM GRANULOCYTES NFR BLD AUTO: 1 % — SIGNIFICANT CHANGE UP (ref 0–1.5)
LYMPHOCYTES # BLD AUTO: 1.54 K/UL — SIGNIFICANT CHANGE UP (ref 1–3.3)
LYMPHOCYTES # BLD AUTO: 22.3 % — SIGNIFICANT CHANGE UP (ref 13–44)
MCHC RBC-ENTMCNC: 27.8 PG — SIGNIFICANT CHANGE UP (ref 27–34)
MCHC RBC-ENTMCNC: 32.1 GM/DL — SIGNIFICANT CHANGE UP (ref 32–36)
MCV RBC AUTO: 86.6 FL — SIGNIFICANT CHANGE UP (ref 80–100)
METHOD TYPE: SIGNIFICANT CHANGE UP
MONOCYTES # BLD AUTO: 0.33 K/UL — SIGNIFICANT CHANGE UP (ref 0–0.9)
MONOCYTES NFR BLD AUTO: 4.8 % — SIGNIFICANT CHANGE UP (ref 2–14)
NEUTROPHILS # BLD AUTO: 4.92 K/UL — SIGNIFICANT CHANGE UP (ref 1.8–7.4)
NEUTROPHILS NFR BLD AUTO: 71 % — SIGNIFICANT CHANGE UP (ref 43–77)
NRBC # BLD: 0 /100 WBCS — SIGNIFICANT CHANGE UP (ref 0–0)
ORGANISM # SPEC MICROSCOPIC CNT: SIGNIFICANT CHANGE UP
PLATELET # BLD AUTO: 420 K/UL — HIGH (ref 150–400)
POTASSIUM SERPL-MCNC: 4.1 MMOL/L — SIGNIFICANT CHANGE UP (ref 3.5–5.3)
POTASSIUM SERPL-SCNC: 4.1 MMOL/L — SIGNIFICANT CHANGE UP (ref 3.5–5.3)
RBC # BLD: 3.2 M/UL — LOW (ref 3.8–5.2)
RBC # FLD: 18.4 % — HIGH (ref 10.3–14.5)
SODIUM SERPL-SCNC: 138 MMOL/L — SIGNIFICANT CHANGE UP (ref 135–145)
SPECIMEN SOURCE: SIGNIFICANT CHANGE UP
WBC # BLD: 6.92 K/UL — SIGNIFICANT CHANGE UP (ref 3.8–10.5)
WBC # FLD AUTO: 6.92 K/UL — SIGNIFICANT CHANGE UP (ref 3.8–10.5)

## 2018-09-15 RX ADMIN — ENOXAPARIN SODIUM 30 MILLIGRAM(S): 100 INJECTION SUBCUTANEOUS at 11:58

## 2018-09-15 RX ADMIN — LINEZOLID 600 MILLIGRAM(S): 600 INJECTION, SOLUTION INTRAVENOUS at 17:30

## 2018-09-15 RX ADMIN — LINEZOLID 600 MILLIGRAM(S): 600 INJECTION, SOLUTION INTRAVENOUS at 05:16

## 2018-09-15 RX ADMIN — PANTOPRAZOLE SODIUM 40 MILLIGRAM(S): 20 TABLET, DELAYED RELEASE ORAL at 05:16

## 2018-09-15 RX ADMIN — Medication 100 MILLIGRAM(S): at 13:15

## 2018-09-15 RX ADMIN — Medication 75 MICROGRAM(S): at 05:16

## 2018-09-15 RX ADMIN — Medication 1 APPLICATION(S): at 21:10

## 2018-09-15 RX ADMIN — Medication 1 TABLET(S): at 07:48

## 2018-09-15 RX ADMIN — Medication 1 TABLET(S): at 11:59

## 2018-09-15 RX ADMIN — Medication 100 MILLIGRAM(S): at 21:10

## 2018-09-15 RX ADMIN — Medication 1 TABLET(S): at 17:30

## 2018-09-15 RX ADMIN — Medication 1 APPLICATION(S): at 13:15

## 2018-09-15 RX ADMIN — Medication 100 MILLIGRAM(S): at 05:16

## 2018-09-15 RX ADMIN — Medication 1 APPLICATION(S): at 05:16

## 2018-09-15 RX ADMIN — ZINC SULFATE TAB 220 MG (50 MG ZINC EQUIVALENT) 220 MILLIGRAM(S): 220 (50 ZN) TAB at 17:30

## 2018-09-15 RX ADMIN — Medication 25 MILLIGRAM(S): at 05:17

## 2018-09-15 RX ADMIN — ZINC SULFATE TAB 220 MG (50 MG ZINC EQUIVALENT) 220 MILLIGRAM(S): 220 (50 ZN) TAB at 05:16

## 2018-09-15 RX ADMIN — Medication 500 MILLIGRAM(S): at 11:58

## 2018-09-15 RX ADMIN — PANTOPRAZOLE SODIUM 40 MILLIGRAM(S): 20 TABLET, DELAYED RELEASE ORAL at 17:30

## 2018-09-15 NOTE — PROVIDER CONTACT NOTE (CRITICAL VALUE NOTIFICATION) - SITUATION
Call placed to MD x 2,message left, awaiting call back
MD made aware blood culture + aerobic bottle gram + cocci clusters
MD made aware blood culture + growth aerobic bottle for staph-epidermidis, Anerobic bottle gram + cocci clusters

## 2018-09-16 ENCOUNTER — TRANSCRIPTION ENCOUNTER (OUTPATIENT)
Age: 83
End: 2018-09-16

## 2018-09-16 DIAGNOSIS — D64.9 ANEMIA, UNSPECIFIED: ICD-10-CM

## 2018-09-16 LAB
ANION GAP SERPL CALC-SCNC: 9 MMOL/L — SIGNIFICANT CHANGE UP (ref 5–17)
BUN SERPL-MCNC: 18 MG/DL — SIGNIFICANT CHANGE UP (ref 7–23)
CALCIUM SERPL-MCNC: 8.2 MG/DL — LOW (ref 8.5–10.1)
CHLORIDE SERPL-SCNC: 109 MMOL/L — HIGH (ref 96–108)
CO2 SERPL-SCNC: 21 MMOL/L — LOW (ref 22–31)
CREAT SERPL-MCNC: 1.2 MG/DL — SIGNIFICANT CHANGE UP (ref 0.5–1.3)
GLUCOSE SERPL-MCNC: 95 MG/DL — SIGNIFICANT CHANGE UP (ref 70–99)
HCT VFR BLD CALC: 31.9 % — LOW (ref 34.5–45)
HGB BLD-MCNC: 10.5 G/DL — LOW (ref 11.5–15.5)
MCHC RBC-ENTMCNC: 28.7 PG — SIGNIFICANT CHANGE UP (ref 27–34)
MCHC RBC-ENTMCNC: 32.9 GM/DL — SIGNIFICANT CHANGE UP (ref 32–36)
MCV RBC AUTO: 87.2 FL — SIGNIFICANT CHANGE UP (ref 80–100)
NRBC # BLD: 0 /100 WBCS — SIGNIFICANT CHANGE UP (ref 0–0)
PLATELET # BLD AUTO: 476 K/UL — HIGH (ref 150–400)
POTASSIUM SERPL-MCNC: 4.1 MMOL/L — SIGNIFICANT CHANGE UP (ref 3.5–5.3)
POTASSIUM SERPL-SCNC: 4.1 MMOL/L — SIGNIFICANT CHANGE UP (ref 3.5–5.3)
RBC # BLD: 3.66 M/UL — LOW (ref 3.8–5.2)
RBC # FLD: 19 % — HIGH (ref 10.3–14.5)
SODIUM SERPL-SCNC: 139 MMOL/L — SIGNIFICANT CHANGE UP (ref 135–145)
WBC # BLD: 6.59 K/UL — SIGNIFICANT CHANGE UP (ref 3.8–10.5)
WBC # FLD AUTO: 6.59 K/UL — SIGNIFICANT CHANGE UP (ref 3.8–10.5)

## 2018-09-16 RX ORDER — RABEPRAZOLE 20 MG/1
1 TABLET, DELAYED RELEASE ORAL
Qty: 0 | Refills: 0 | COMMUNITY

## 2018-09-16 RX ORDER — ASCORBIC ACID 60 MG
1 TABLET,CHEWABLE ORAL
Qty: 0 | Refills: 0 | COMMUNITY
Start: 2018-09-16

## 2018-09-16 RX ORDER — DONEPEZIL HYDROCHLORIDE 10 MG/1
1 TABLET, FILM COATED ORAL
Qty: 0 | Refills: 0 | COMMUNITY

## 2018-09-16 RX ORDER — AMLODIPINE BESYLATE 2.5 MG/1
1 TABLET ORAL
Qty: 0 | Refills: 0 | COMMUNITY
Start: 2018-09-16

## 2018-09-16 RX ORDER — PANTOPRAZOLE SODIUM 20 MG/1
1 TABLET, DELAYED RELEASE ORAL
Qty: 0 | Refills: 0 | COMMUNITY
Start: 2018-09-16

## 2018-09-16 RX ORDER — AZTREONAM 2 G
1 VIAL (EA) INJECTION
Qty: 0 | Refills: 0 | COMMUNITY

## 2018-09-16 RX ORDER — CIPROFLOXACIN LACTATE 400MG/40ML
1 VIAL (ML) INTRAVENOUS
Qty: 0 | Refills: 0 | COMMUNITY

## 2018-09-16 RX ORDER — FUROSEMIDE 40 MG
1 TABLET ORAL
Qty: 0 | Refills: 0 | COMMUNITY

## 2018-09-16 RX ORDER — AMLODIPINE BESYLATE 2.5 MG/1
2.5 TABLET ORAL DAILY
Qty: 0 | Refills: 0 | Status: DISCONTINUED | OUTPATIENT
Start: 2018-09-17 | End: 2018-09-19

## 2018-09-16 RX ORDER — ENOXAPARIN SODIUM 100 MG/ML
30 INJECTION SUBCUTANEOUS
Qty: 0 | Refills: 0 | COMMUNITY
Start: 2018-09-16

## 2018-09-16 RX ADMIN — ENOXAPARIN SODIUM 30 MILLIGRAM(S): 100 INJECTION SUBCUTANEOUS at 12:53

## 2018-09-16 RX ADMIN — Medication 100 MILLIGRAM(S): at 06:28

## 2018-09-16 RX ADMIN — ZINC SULFATE TAB 220 MG (50 MG ZINC EQUIVALENT) 220 MILLIGRAM(S): 220 (50 ZN) TAB at 17:40

## 2018-09-16 RX ADMIN — Medication 25 MILLIGRAM(S): at 06:28

## 2018-09-16 RX ADMIN — Medication 75 MICROGRAM(S): at 06:28

## 2018-09-16 RX ADMIN — Medication 100 MILLIGRAM(S): at 21:29

## 2018-09-16 RX ADMIN — Medication 500 MILLIGRAM(S): at 12:53

## 2018-09-16 RX ADMIN — PANTOPRAZOLE SODIUM 40 MILLIGRAM(S): 20 TABLET, DELAYED RELEASE ORAL at 17:40

## 2018-09-16 RX ADMIN — Medication 1 APPLICATION(S): at 14:46

## 2018-09-16 RX ADMIN — Medication 1 TABLET(S): at 08:06

## 2018-09-16 RX ADMIN — Medication 1 TABLET(S): at 12:53

## 2018-09-16 RX ADMIN — ZINC SULFATE TAB 220 MG (50 MG ZINC EQUIVALENT) 220 MILLIGRAM(S): 220 (50 ZN) TAB at 06:29

## 2018-09-16 RX ADMIN — PANTOPRAZOLE SODIUM 40 MILLIGRAM(S): 20 TABLET, DELAYED RELEASE ORAL at 06:29

## 2018-09-16 RX ADMIN — Medication 1 APPLICATION(S): at 06:28

## 2018-09-16 RX ADMIN — Medication 1 APPLICATION(S): at 21:28

## 2018-09-16 RX ADMIN — Medication 1 TABLET(S): at 16:59

## 2018-09-16 RX ADMIN — LINEZOLID 600 MILLIGRAM(S): 600 INJECTION, SOLUTION INTRAVENOUS at 06:28

## 2018-09-16 RX ADMIN — Medication 100 MILLIGRAM(S): at 14:46

## 2018-09-16 RX ADMIN — LINEZOLID 600 MILLIGRAM(S): 600 INJECTION, SOLUTION INTRAVENOUS at 17:40

## 2018-09-16 NOTE — DISCHARGE NOTE ADULT - CARE PLAN
Principal Discharge DX:	Cellulitis and abscess  Goal:	wound care ,,and resolve  Assessment and plan of treatment:	finish abx  Secondary Diagnosis:	Damian's esophagus without dysplasia  Goal:	take ppi  Secondary Diagnosis:	Alzheimer's dementia without behavioral disturbance, unspecified timing of dementia onset  Goal:	consider aricept  Secondary Diagnosis:	Acute GI bleeding  Goal:	post transfusion , may need more  Secondary Diagnosis:	Coronary artery disease with other form of angina pectoris  Goal:	cant have aspirin due to gi bleed needing transfusions  Secondary Diagnosis:	Essential hypertension  Goal:	norvasc  Secondary Diagnosis:	Hypothyroidism, unspecified type  Goal:	synthroid

## 2018-09-16 NOTE — PROVIDER CONTACT NOTE (CRITICAL VALUE NOTIFICATION) - TEST AND RESULT REPORTED:
Surgical Culture 9/13/18- Lower Back Abcess- +MRSA  Wound Culture- 9/13/18- numerous 3 or more mixed gram negative rods including Morganella Morganii- Susceptibility not performed- Numerous MRSA Final

## 2018-09-16 NOTE — DISCHARGE NOTE ADULT - PATIENT PORTAL LINK FT
You can access the [facility] Patient Portal, offered by [facility], by registering with the following website: [email or web address]

## 2018-09-16 NOTE — DISCHARGE NOTE ADULT - OTHER SIGNIFICANT FINDINGS
RECENT CULTURES:  Culture Results:   Numerous Methicillin resistant Staphylococcus aureus (09-13 @ 21:59)  Culture Results:   Numerous Three or more mixed gram negative rods  including Morganella morganii "Susceptibilities not performed"  Numerous Methicillin resistant Staphylococcus aureus (09-13 @ 09:39)  Culture Results:   >100,000 CFU/ml Morganella morganii (09-12 @ 16:51)  Culture Results:   Growth in aerobic bottle: Staphylococcus epidermidis (09-12 @ 15:33)  Culture Results:   Growth in aerobic and anaerobic bottles: Staphylococcus epidermidis        Culture - Surgical Swab (collected 09-13-18 @ 21:59)  Source: .Surgical Swab lower back abscess c/s  Preliminary Report (09-15-18 @ 17:43):    Numerous Methicillin resistant Staphylococcus aureus  Organism: Methicillin resistant Staphylococcus aureus (09-15-18 @ 17:42)  Organism: Methicillin resistant Staphylococcus aureus (09-15-18 @ 17:42)      -  Ampicillin/Sulbactam: R 16/8      -  Cefazolin: R 8      -  Clindamycin: S 0.5      -  Daptomycin: S 0.5      -  Erythromycin: R >4      -  Gentamicin: S <=1 Should not be used as monotherapy      -  Linezolid: S 4      -  Oxacillin: R >2      -  Penicillin: R >8      -  RIF- Rifampin: S <=1 Should not be used as monotherapy      -  Tetra/Doxy: S <=1      -  Trimethoprim/Sulfamethoxazole: S <=0.5/9.5        -  Ertapenem: S <=0.5      -  Gentamicin: R >8      -  Imipenem: R 4      -  Levofloxacin: R >4      -  Meropenem: S <=1      -  Nitrofurantoin: R >64 Should not be used to treat pyelonephritis      -  Piperacillin/Tazobactam: S 16      -  Tetra/Doxy: R 8      -  Tigecycline: R <=1      -  Tobramycin: I 8      -  Trimethoprim/Sulfamethoxazole: R >2/38      Method Type: LEVI    Culture - Blood (collected 09-12-18 @ 15:33)  Source: .Blood Blood-Peripheral  Gram Stain (09-15-18 @ 00:40):    Growth in aerobic bottle: Gram Positive Cocci in Clusters    Growth in anaerobic bottle: Gram Positive Cocci in Clusters  Final Report (09-15-18 @ 16:16):    Growth in aerobic and anaerobic bottles: Staphylococcus epidermidis    "Due to technical problems, Proteus sp. will Not be reported as part of      Culture - Blood (collected 09-12-18 @ 15:33)  Source: .Blood Blood-Peripheral  Gram Stain (09-14-18 @ 00:41):    Growth in aerobic bottle: Gram Positive Cocci in Clusters  Final Report (09-15-18 @ 22:09):    Growth in aerobic bottle: Staphylococcus epidermidis  Organism: Coag Negative Staphylococcus (09-15-18 @ 22:09)  Organism: Coag Negative Staphylococcus (09-15-18 @ 22:09)      -  Ampicillin/Sulbactam: R <=8/4      -  Cefazolin: R <=4      -  Clindamycin: R >4      -  Erythromycin: R >4      -  Gentamicin: R >8 Should not be used as monotherapy      -  Oxacillin: R >2      -  Penicillin: R 4      -  RIF- Rifampin: S <=1 Should not be used as monotherapy      -  Tetra/Doxy: S <=1      -  Trimethoprim/Sulfamethoxazole: R >2/38      -  Vancomycin: S 2      Method Type: LEVI

## 2018-09-16 NOTE — DISCHARGE NOTE ADULT - SECONDARY DIAGNOSIS.
Damian's esophagus without dysplasia Alzheimer's dementia without behavioral disturbance, unspecified timing of dementia onset Acute GI bleeding Coronary artery disease with other form of angina pectoris Essential hypertension Hypothyroidism, unspecified type

## 2018-09-16 NOTE — DISCHARGE NOTE ADULT - NS MD DC FALL RISK RISK
Subjective:       Patient ID: Greyson Nixon is a 29 y.o. male.    Chief Complaint: Immunizations    History of present illness: Vaccines for school.  Patient is restarting at the [facility] and needed documentation of vaccines plus needs an updated meningitis vaccine and TDap labs were done a few months ago and were stable.  He otherwise feels fine.      Review of Systems   Constitutional: Negative for chills, fatigue, fever and unexpected weight change.   HENT: Negative for trouble swallowing.    Eyes: Negative for visual disturbance.   Respiratory: Negative for cough, shortness of breath and wheezing.    Cardiovascular: Negative for chest pain and palpitations.   Gastrointestinal: Negative for abdominal pain, constipation, diarrhea, nausea and vomiting.   Genitourinary: Negative for difficulty urinating.   Musculoskeletal: Negative for neck pain.   Skin: Negative for rash.   Neurological: Negative for dizziness and headaches.       Objective:      Physical Exam   Constitutional: He is oriented to person, place, and time. He appears well-developed and well-nourished.   HENT:   Head: Normocephalic and atraumatic.   Cardiovascular: Normal rate and regular rhythm.    Pulmonary/Chest: Effort normal and breath sounds normal.   Neurological: He is alert and oriented to person, place, and time.       Assessment:       1. Need for diphtheria-tetanus-pertussis (Tdap) vaccine, adult/adolescent    2. Routine physical examination        Plan:       Greyson was seen today for immunizations.    Diagnoses and all orders for this visit:    Need for diphtheria-tetanus-pertussis (Tdap) vaccine, adult/adolescent  -     Tdap Vaccine    Routine physical examination          
For information on Fall & Injury Prevention, visit [email or web address]

## 2018-09-16 NOTE — DISCHARGE NOTE ADULT - PLAN OF CARE
wound care ,,and resolve finish abx take ppi consider aricept post transfusion , may need more cant have aspirin due to gi bleed needing transfusions norvasc synthroid

## 2018-09-16 NOTE — DISCHARGE NOTE ADULT - MEDICATION SUMMARY - MEDICATIONS TO STOP TAKING
I will STOP taking the medications listed below when I get home from the hospital:    RABEprazole 20 mg oral tablet, extended release  -- 2 tab(s) by mouth once a day

## 2018-09-16 NOTE — DISCHARGE NOTE ADULT - HOSPITAL COURSE
· Assessment		  89 yo f , with hx of chronic a fib , not on anticoag due to falls , age and risk of bleeds and recent GI bleed requiring transfusion , advance dementia , hypertension , osteoarthritis osteoporosis   patient was at rehab post recent hospitalizations for uti , and gi  bleed , treated conservatively with transfusion , and in rehab was noted to have a draining buttock abscess , and admitted for possible I and d ,  patient seen by surgery and will probably have i and d if family agreeable , but aware of high risk due to age and advance dentia and a fib ,   she is frail and weak , and does not talk much , sleeps mostly and is bed ridden and wheel chair bound and has functional quadriplegia ,   discussed with surgery and dtr ,    patient in optimal condition for I and D  of left buttock  ct buttock noted , awaiting i and d of abscess , anemic again , transfuse 2 units , Iv abx , dnr dni , d/w dtr and surgeon ,  dtr aware that patient elderly and with multiple medical issues and poor nutrition and high risk for decubes , and poor wound heeling ,   patient post I and D left buttock abcsess , and has packing , needs abx but she keeps pulling iv line , post 2 units bloods , will try po zyvox , and probiotics , and dc all iv meds ,   case d/w dtr , also provide surgical wound care , , advance diet , overall prognosis is poor ,   there is behavioral issues due to dementia will start aricept      Problem/Plan - 1:  ·  Problem: Cellulitis and abscess.  Plan: cultures , iv abx , surgical consult for I and d.   post I and D of left buttock abcsess and needs wound care , with MRSA wound on po bactrim ,      Problem/Plan - 2:  ·  Problem: Damian's esophagus without dysplasia.  Plan: protonix.      Problem/Plan - 3:  ·  Problem: Late onset Alzheimer's disease with behavioral disturbance.  Plan: monitor.      Problem/Plan - 4:  ·  Problem: TIA (transient ischemic attack).  Plan: monitor , lovenox dvt prophylax.      Problem/Plan - 5:  ·  Problem: Coronary artery disease involving native coronary artery with other forms of angina pectoris.  Plan: monitor ,      Problem/Plan - 6:  Problem: Hypothyroidism due to Hashimoto's thyroiditis. Plan: synthroid.     Problem/Plan - 7:  ·  Problem: Atrial fibrillation, unspecified type.  Plan: off A/C due to age falls and gi bleed.      Problem/Plan - 8:  ·  Problem: Acute GI bleeding.  Plan: post transfusion may need more. · Assessment		  89 yo f , with hx of chronic a fib , not on anticoag due to falls , age and risk of bleeds and recent GI bleed requiring transfusion , advance dementia , hypertension , osteoarthritis osteoporosis   patient was at rehab post recent hospitalizations for uti , and gi  bleed , treated conservatively with transfusion , and in rehab was noted to have a draining buttock abscess , and admitted for possible I and d ,  patient seen by surgery and will probably have i and d if family agreeable , but aware of high risk due to age and advance dentia and a fib ,   she is frail and weak , and does not talk much , sleeps mostly and is bed ridden and wheel chair bound and has functional quadriplegia ,   discussed with surgery and dtr ,    patient in optimal condition for I and D  of left buttock  ct buttock noted , awaiting i and d of abscess , anemic again , transfuse 2 units , Iv abx , dnr dni , d/w dtr and surgeon ,  dtr aware that patient elderly and with multiple medical issues and poor nutrition and high risk for decubes , and poor wound heeling ,   patient post I and D left buttock abcsess , and has packing , needs abx but she keeps pulling iv line , post 2 units bloods , will try po zyvox , and probiotics , and dc all iv meds ,   case d/w dtr , also provide surgical wound care , , advance diet , overall prognosis is poor ,   there is behavioral issues due to dementia will start aricept     patient afebrile , seems calm , eating better per staff , meds and labs reviewed ,   UTI , Noelelli , will add po vantin , as she pulls out IV lines despite nursing interventions ,   wound culture from buttock , MRSA on zyvox po ,   also left forhead scar / scab was opened by surgery   plan dc to rehab ,   d/w dtr          Problem/Plan - 1:  ·  Problem: Cellulitis and abscess.  Plan: cultures , iv abx , surgical consult for I and d.   post I and D of left buttock abcsess and needs wound care , with MRSA wound on po bactrim ,      Problem/Plan - 2:  ·  Problem: Damian's esophagus without dysplasia.  Plan: protonix.      Problem/Plan - 3:  ·  Problem: Late onset Alzheimer's disease with behavioral disturbance.  Plan: monitor.      Problem/Plan - 4:  ·  Problem: TIA (transient ischemic attack).  Plan: monitor , lovenox dvt prophylax.      Problem/Plan - 5:  ·  Problem: Coronary artery disease involving native coronary artery with other forms of angina pectoris.  Plan: monitor ,      Problem/Plan - 6:  Problem: Hypothyroidism due to Hashimoto's thyroiditis. Plan: synthroid.     Problem/Plan - 7:  ·  Problem: Atrial fibrillation, unspecified type.  Plan: off A/C due to age falls and gi bleed.      Problem/Plan - 8:  ·  Problem: Acute GI bleeding.  Plan: post transfusion may need more. · Assessment		  89 yo f , with hx of chronic a fib , not on anticoag due to falls , age and risk of bleeds and recent GI bleed requiring transfusion , advance dementia , hypertension , osteoarthritis osteoporosis   patient was at rehab post recent hospitalizations for uti , and gi  bleed , treated conservatively with transfusion , and in rehab was noted to have a draining buttock abscess , and admitted for possible I and d ,  patient seen by surgery and will probably have i and d if family agreeable , but aware of high risk due to age and advance dentia and a fib ,   she is frail and weak , and does not talk much , sleeps mostly and is bed ridden and wheel chair bound and has functional quadriplegia ,   discussed with surgery and dtr ,    patient in optimal condition for I and D  of left buttock  ct buttock noted , awaiting i and d of abscess , anemic again , transfuse 2 units , Iv abx , dnr dni , d/w dtr and surgeon ,  dtr aware that patient elderly and with multiple medical issues and poor nutrition and high risk for decubes , and poor wound heeling ,   patient post I and D left buttock abcsess , and has packing , needs abx but she keeps pulling iv line , post 2 units bloods , will try po zyvox , and probiotics , and dc all iv meds ,   case d/w dtr , also provide surgical wound care , , advance diet , overall prognosis is poor ,   there is behavioral issues due to dementia will start aricept     patient afebrile , seems calm , eating better per staff , meds and labs reviewed ,   UTI Johnson , will add po vantin , as she pulls out IV lines despite nursing interventions ,   wound culture from buttock , MRSA on zyvox po ,   also left for head scar / scab was opened by surgery , apply bacitracin ,   left buttock wound care with Aquacel , and packing post cleansing with ns ,   po abx for mrsa wound and abscess and po abx for uti   plan dc to rehab ,   d/w dtr          Problem/Plan - 1:  ·  Problem: Cellulitis and abscess.  Plan: cultures , iv abx , surgical consult for I and d.   post I and D of left buttock abcsess and needs wound care , with MRSA wound on po bactrim ,      Problem/Plan - 2:  ·  Problem: Damian's esophagus without dysplasia.  Plan: protonix.      Problem/Plan - 3:  ·  Problem: Late onset Alzheimer's disease with behavioral disturbance.  Plan: monitor.      Problem/Plan - 4:  ·  Problem: TIA (transient ischemic attack).  Plan: monitor , lovenox dvt prophylax.      Problem/Plan - 5:  ·  Problem: Coronary artery disease involving native coronary artery with other forms of angina pectoris.  Plan: monitor ,      Problem/Plan - 6:  Problem: Hypothyroidism due to Hashimoto's thyroiditis. Plan: synthroid.     Problem/Plan - 7:  ·  Problem: Atrial fibrillation, unspecified type.  Plan: off A/C due to age falls and gi bleed.      Problem/Plan - 8:  ·  Problem: Acute GI bleeding.  Plan: post transfusion may need more.     · Notification Details(SBAR)                                                 Situation	Wound Consult	  · Assessment	Patient is a 89yo bedbound female presenting with surgical wound left buttock 2* to abscess s/p I&D wound bed is red non granulating 3 x 6 x 3 tunnels at 12:00 15cm to second open surgical wound. no s/s of discomfort or pain during exam and dressing application. Patients daughter at bedside	  · Recommendations	Cleanse with NS paint periwound with cavilon pack with aquacel, cover with dry dressing daily consider packing secondary wound with shira QOD	  · Action/Treatment Ordered:	MD made aware, telephone orders obtained, RN instructed in care of the patients skin and wound

## 2018-09-16 NOTE — DISCHARGE NOTE ADULT - CARE PROVIDER_API CALL
Jovan Escobedo), Internal Medicine  [address] [place] [address]  Phone: [phone]  Fax: [phone]

## 2018-09-16 NOTE — DISCHARGE NOTE ADULT - MEDICATION SUMMARY - MEDICATIONS TO TAKE
I will START or STAY ON the medications listed below when I get home from the hospital:    acetaminophen 325 mg oral tablet  -- 2 tab(s) by mouth every 8 hours, As Needed -pain/ back  -- Indication: For pain    enoxaparin  -- 30 milligram(s) subcutaneously once a day  -- Indication: For Dvt prophylax , stop x few weeks     Betadine 10% topical solution  -- Apply on skin to affected area once a day(to left foread)  -- Indication: For wound care     Metoprolol Succinate ER 25 mg oral tablet, extended release  -- 0.5 tab(s) by mouth once a day MDD:1  -- It is very important that you take or use this exactly as directed.  Do not skip doses or discontinue unless directed by your doctor.  May cause drowsiness.  Alcohol may intensify this effect.  Use care when operating dangerous machinery.  Some non-prescription drugs may aggravate your condition.  Read all labels carefully.  If a warning appears, check with your doctor before taking.  Swallow whole.  Do not crush.  Take with food or milk.  This drug may impair the ability to drive or operate machinery.  Use care until you become familiar with its effects.    -- Indication: For Htn    ipratropium-albuterol 0.5 mg-2.5 mg/3 mLinhalation solution  -- 1 dose(s) inhaled every 12 hours  -- Indication: For nebs for copd    amLODIPine 2.5 mg oral tablet  -- 1 tab(s) by mouth once a day  -- Indication: For Htn    Aricept 5 mg oral tablet  -- 1 tab(s) by mouth once a day (at bedtime)  -- Indication: For Dementia    Santyl  -- Apply on skin to affected area once a day(to left buttock)  -- Indication: For wound    Cranberry oral tablet  -- 450 milligram(s) by mouth 2 times a day  -- Indication: For uti    ferrous sulfate 300 mg/5 mL (60 mg elemental iron) oral liquid  -- 60 milligram(s) by mouth 2 times a day  -- Indication: For Anemia, unspecified type    docusate sodium 100 mg oral capsule  -- 2 cap(s) by mouth once a day (at bedtime)  -- Indication: For Laxatives    zinc sulfate 220 mg oral capsule  -- 1 cap(s) by mouth once a day  -- Indication: For vitamin    Bacid (LAC) oral tablet  -- 1 tab(s) by mouth 2 times a day( for ten days)  -- Indication: For probiotics    pantoprazole 40 mg oral delayed release tablet  -- 1 tab(s) by mouth 2 times a day  -- Indication: For ppi for GI bleed    Bactrim  mg-160 mg oral tablet  -- 1 tab(s) by mouth 2 times a day  x 1 week   -- 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 I will START or STAY ON the medications listed below when I get home from the hospital:    acetaminophen 325 mg oral tablet  -- 2 tab(s) by mouth every 8 hours, As Needed -pain/ back  -- Indication: For pain    enoxaparin  -- 30 milligram(s) subcutaneously once a day  -- Indication: For Dvt prophylax , stop x few weeks     Betadine 10% topical solution  -- Apply on skin to affected area once a day(to left foread)  -- Indication: For wound care     Metoprolol Succinate ER 25 mg oral tablet, extended release  -- 0.5 tab(s) by mouth once a day MDD:1  -- It is very important that you take or use this exactly as directed.  Do not skip doses or discontinue unless directed by your doctor.  May cause drowsiness.  Alcohol may intensify this effect.  Use care when operating dangerous machinery.  Some non-prescription drugs may aggravate your condition.  Read all labels carefully.  If a warning appears, check with your doctor before taking.  Swallow whole.  Do not crush.  Take with food or milk.  This drug may impair the ability to drive or operate machinery.  Use care until you become familiar with its effects.    -- Indication: For Htn    ipratropium-albuterol 0.5 mg-2.5 mg/3 mLinhalation solution  -- 1 dose(s) inhaled every 12 hours  -- Indication: For nebs for copd    amLODIPine 2.5 mg oral tablet  -- 1 tab(s) by mouth once a day  -- Indication: For Htn    cefpodoxime 200 mg oral tablet  -- 1 tab(s) by mouth every 12 hours  x 1 week   -- Indication: For vantin    Aricept 5 mg oral tablet  -- 1 tab(s) by mouth once a day (at bedtime)  -- Indication: For Dementia    Santyl  -- Apply on skin to affected area once a day(to left buttock)  -- Indication: For wound    Cranberry oral tablet  -- 450 milligram(s) by mouth 2 times a day  -- Indication: For uti    ferrous sulfate 300 mg/5 mL (60 mg elemental iron) oral liquid  -- 60 milligram(s) by mouth 2 times a day  -- Indication: For Anemia, unspecified type    docusate sodium 100 mg oral capsule  -- 2 cap(s) by mouth once a day (at bedtime)  -- Indication: For Laxatives    zinc sulfate 220 mg oral capsule  -- 1 cap(s) by mouth once a day  -- Indication: For vitamin    Bacid (LAC) oral tablet  -- 1 tab(s) by mouth 2 times a day( for ten days)  -- Indication: For probiotics    pantoprazole 40 mg oral delayed release tablet  -- 1 tab(s) by mouth 2 times a day  -- Indication: For ppi for GI bleed    Bactrim  mg-160 mg oral tablet  -- 1 tab(s) by mouth 2 times a day  x 1 week   -- 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 I will START or STAY ON the medications listed below when I get home from the hospital:    freetext medication  -  -- 1 application on skin once a day  -- Indication: For wound    acetaminophen 325 mg oral tablet  -- 2 tab(s) by mouth every 8 hours, As Needed -pain/ back  -- Indication: For pain    enoxaparin  -- 30 milligram(s) subcutaneously once a day  -- Indication: For Dvt prophylax , stop x few weeks     Betadine 10% topical solution  -- Apply on skin to affected area once a day(to left foread)  -- Indication: For wound care     Metoprolol Succinate ER 25 mg oral tablet, extended release  -- 0.5 tab(s) by mouth once a day MDD:1  -- It is very important that you take or use this exactly as directed.  Do not skip doses or discontinue unless directed by your doctor.  May cause drowsiness.  Alcohol may intensify this effect.  Use care when operating dangerous machinery.  Some non-prescription drugs may aggravate your condition.  Read all labels carefully.  If a warning appears, check with your doctor before taking.  Swallow whole.  Do not crush.  Take with food or milk.  This drug may impair the ability to drive or operate machinery.  Use care until you become familiar with its effects.    -- Indication: For Htn    ipratropium-albuterol 0.5 mg-2.5 mg/3 mLinhalation solution  -- 1 dose(s) inhaled every 12 hours  -- Indication: For nebs for copd    amLODIPine 2.5 mg oral tablet  -- 1 tab(s) by mouth once a day  -- Indication: For Htn    cefpodoxime 200 mg oral tablet  -- 1 tab(s) by mouth every 12 hours  x 1 week   -- Indication: For vantin    Aricept 5 mg oral tablet  -- 1 tab(s) by mouth once a day (at bedtime)  -- Indication: For Dementia    Santyl  -- Apply on skin to affected area once a day(to left buttock)  -- Indication: For wound    Cranberry oral tablet  -- 450 milligram(s) by mouth 2 times a day  -- Indication: For uti    ferrous sulfate 300 mg/5 mL (60 mg elemental iron) oral liquid  -- 60 milligram(s) by mouth 2 times a day  -- Indication: For Anemia, unspecified type    docusate sodium 100 mg oral capsule  -- 2 cap(s) by mouth once a day (at bedtime)  -- Indication: For Laxatives    zinc sulfate 220 mg oral capsule  -- 1 cap(s) by mouth once a day  -- Indication: For vitamin    Bacid (LAC) oral tablet  -- 1 tab(s) by mouth 2 times a day( for ten days)  -- Indication: For probiotics    pantoprazole 40 mg oral delayed release tablet  -- 1 tab(s) by mouth 2 times a day  -- Indication: For ppi for GI bleed    Bactrim  mg-160 mg oral tablet  -- 1 tab(s) by mouth 2 times a day  x 1 week   -- 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

## 2018-09-16 NOTE — CHART NOTE - NSCHARTNOTEFT_GEN_A_CORE
Called by RN for the following critical values:   Surgical Culture (9/13/18; Lower Back Abcess)- (+) MRSA, sensitive to multiple antibiotic agents, including Linezolid.  Wound Culture (9/13/18; numerous 3 or more mixed gram negative rods including Morganella Morganii with Susceptibility not performed; (+) MRSA sensitive to multiple antibiotic agents, including Linezolid.  Patient already on Linezolid 600mg PO q12h. No change in antibiotic management at this time, further management to be resumed by primary team. Advised RN to continue reaching out to Dr. Escobedo regarding critical value results. Will continue to follow, RN to call if any changes. Called by RN for the following critical values:   Surgical Culture (9/13/18; Lower Back Abcess) - (+) MRSA, sensitive to multiple antibiotic agents, including Linezolid.  Wound Culture (9/13/18; numerous 3 or more mixed gram negative rods including Morganella Morganii with Susceptibility not performed; (+) MRSA sensitive to multiple antibiotic agents, including Linezolid.  Patient already on Linezolid 600mg PO q12h. No change in antibiotic management at this time, further management per primary team. Will remain on isolation precautions given findings. Advised RN to reach out to Dr. Escobedo regarding critical value results. Will continue to follow, RN to call if any changes.

## 2018-09-16 NOTE — PROVIDER CONTACT NOTE (CRITICAL VALUE NOTIFICATION) - ACTION/TREATMENT ORDERED:
Attempted to call Dr. Escobedo. No response.
Pt. on antibiotic, no further orders at this time.
Pt. on antibiotic. Specimen may be contaminated. No further orders at this time

## 2018-09-17 DIAGNOSIS — G30.9 ALZHEIMER'S DISEASE, UNSPECIFIED: ICD-10-CM

## 2018-09-17 LAB
HCT VFR BLD CALC: 37 % — SIGNIFICANT CHANGE UP (ref 34.5–45)
HGB BLD-MCNC: 11.8 G/DL — SIGNIFICANT CHANGE UP (ref 11.5–15.5)
MCHC RBC-ENTMCNC: 28.2 PG — SIGNIFICANT CHANGE UP (ref 27–34)
MCHC RBC-ENTMCNC: 31.9 GM/DL — LOW (ref 32–36)
MCV RBC AUTO: 88.5 FL — SIGNIFICANT CHANGE UP (ref 80–100)
NRBC # BLD: 0 /100 WBCS — SIGNIFICANT CHANGE UP (ref 0–0)
PLATELET # BLD AUTO: 593 K/UL — HIGH (ref 150–400)
RBC # BLD: 4.18 M/UL — SIGNIFICANT CHANGE UP (ref 3.8–5.2)
RBC # FLD: 19.3 % — HIGH (ref 10.3–14.5)
WBC # BLD: 8.57 K/UL — SIGNIFICANT CHANGE UP (ref 3.8–10.5)
WBC # FLD AUTO: 8.57 K/UL — SIGNIFICANT CHANGE UP (ref 3.8–10.5)

## 2018-09-17 RX ORDER — CEFPODOXIME PROXETIL 100 MG
200 TABLET ORAL EVERY 12 HOURS
Qty: 0 | Refills: 0 | Status: DISCONTINUED | OUTPATIENT
Start: 2018-09-17 | End: 2018-09-19

## 2018-09-17 RX ORDER — CEFPODOXIME PROXETIL 100 MG
1 TABLET ORAL
Qty: 0 | Refills: 0 | COMMUNITY
Start: 2018-09-17

## 2018-09-17 RX ADMIN — AMLODIPINE BESYLATE 2.5 MILLIGRAM(S): 2.5 TABLET ORAL at 05:56

## 2018-09-17 RX ADMIN — PANTOPRAZOLE SODIUM 40 MILLIGRAM(S): 20 TABLET, DELAYED RELEASE ORAL at 05:56

## 2018-09-17 RX ADMIN — LINEZOLID 600 MILLIGRAM(S): 600 INJECTION, SOLUTION INTRAVENOUS at 05:56

## 2018-09-17 RX ADMIN — Medication 500 MILLIGRAM(S): at 11:34

## 2018-09-17 RX ADMIN — ENOXAPARIN SODIUM 30 MILLIGRAM(S): 100 INJECTION SUBCUTANEOUS at 11:35

## 2018-09-17 RX ADMIN — Medication 1 APPLICATION(S): at 05:56

## 2018-09-17 RX ADMIN — Medication 1 TABLET(S): at 07:50

## 2018-09-17 RX ADMIN — PANTOPRAZOLE SODIUM 40 MILLIGRAM(S): 20 TABLET, DELAYED RELEASE ORAL at 17:33

## 2018-09-17 RX ADMIN — Medication 1 TABLET(S): at 17:32

## 2018-09-17 RX ADMIN — ZINC SULFATE TAB 220 MG (50 MG ZINC EQUIVALENT) 220 MILLIGRAM(S): 220 (50 ZN) TAB at 05:56

## 2018-09-17 RX ADMIN — Medication 100 MILLIGRAM(S): at 21:16

## 2018-09-17 RX ADMIN — LINEZOLID 600 MILLIGRAM(S): 600 INJECTION, SOLUTION INTRAVENOUS at 17:33

## 2018-09-17 RX ADMIN — Medication 1 TABLET(S): at 11:34

## 2018-09-17 RX ADMIN — Medication 25 MILLIGRAM(S): at 05:56

## 2018-09-17 RX ADMIN — Medication 1 APPLICATION(S): at 21:16

## 2018-09-17 RX ADMIN — ZINC SULFATE TAB 220 MG (50 MG ZINC EQUIVALENT) 220 MILLIGRAM(S): 220 (50 ZN) TAB at 17:33

## 2018-09-17 RX ADMIN — Medication 100 MILLIGRAM(S): at 05:56

## 2018-09-17 RX ADMIN — Medication 75 MICROGRAM(S): at 05:56

## 2018-09-17 RX ADMIN — Medication 200 MILLIGRAM(S): at 17:33

## 2018-09-17 NOTE — PROVIDER CONTACT NOTE (CHANGE IN STATUS NOTIFICATION) - ASSESSMENT
Patient is a 89yo bedbound female presenting with surgical wound left buttock 2* to abscess s/p I&D wound bed is red non granulating 3 x 6 x 3 tunnels at 12:00 15cm to second open surgical wound. no s/s of discomfort or pain during exam and dressing application. Patients daughter at bedside

## 2018-09-17 NOTE — PROGRESS NOTE ADULT - ASSESSMENT
89 yo with back abscess, drained and forehead wound     cleared for d/c from surgical standpoint     dry dressing to back wound     wound nurse to see re: forehead wound

## 2018-09-17 NOTE — PROGRESS NOTE ADULT - PROBLEM SELECTOR PLAN 1
cultures , iv abx , surgical consult for I and d  post I and D , on po antibiotics as she keeps pulling out iv lines , despite nursing intervention , and provide wound care

## 2018-09-17 NOTE — PROGRESS NOTE ADULT - ASSESSMENT
89 yo f , with hx of chronic a fib , not on anticoag due to falls , age and risk of bleeds and recent GI bleed requiring transfusion , advance dementia , hypertension , osteoarthritis osteoporosis   patient was at rehab post recent hospitalizations for uti , and gi  bleed , treated conservatively with transfusion , and in rehab was noted to have a draining buttock abscess , and admitted for possible I and d ,  patient seen by surgery and will probably have i and d if family agreeable , but aware of high risk due to age and advance dentia and a fib ,   she is frail and weak , and does not talk much , sleeps mostly and is bed ridden and wheel chair bound and has functional quadriplegia ,   discussed with surgery and dtr ,    patient in optimal condition for I and D  of left buttock  ct buttock noted , awaiting i and d of abscess , anemic again , transfuse 2 units , Iv abx , dnr dni , d/w dtr and surgeon ,  dtr aware that patient elderly and with multiple medical issues and poor nutrition and high risk for decubes , and poor wound heeling ,   patient post I and D left buttock abcsess , and has packing , needs abx but she keeps pulling iv line , post 2 units bloods , will try po zyvox , and probiotics , and dc all iv meds ,   case d/w dtr , also provide surgical wound care , , advance diet , overall prognosis is poor ,   there is behavioral issues due to dementia will start aricept     patient afebrile , seems calm , eating better per staff , meds and labs reviewed ,   UTI Johnson , will add po vantin , as she pulls out IV lines despite nursing interventions ,   wound culture from buttock , MRSA on zyvox po ,   plan dc to rehab ,   d/w dtr

## 2018-09-17 NOTE — PROVIDER CONTACT NOTE (CHANGE IN STATUS NOTIFICATION) - RECOMMENDATIONS
Cleanse with NS paint periwound with cavilon pack with aquacel, cover with dry dressing daily consider packing secondary wound with shira QOD

## 2018-09-18 LAB
CULTURE RESULTS: SIGNIFICANT CHANGE UP
ORGANISM # SPEC MICROSCOPIC CNT: SIGNIFICANT CHANGE UP
ORGANISM # SPEC MICROSCOPIC CNT: SIGNIFICANT CHANGE UP
SPECIMEN SOURCE: SIGNIFICANT CHANGE UP

## 2018-09-18 RX ADMIN — Medication 100 MILLIGRAM(S): at 06:20

## 2018-09-18 RX ADMIN — ZINC SULFATE TAB 220 MG (50 MG ZINC EQUIVALENT) 220 MILLIGRAM(S): 220 (50 ZN) TAB at 06:25

## 2018-09-18 RX ADMIN — PANTOPRAZOLE SODIUM 40 MILLIGRAM(S): 20 TABLET, DELAYED RELEASE ORAL at 17:37

## 2018-09-18 RX ADMIN — Medication 200 MILLIGRAM(S): at 17:37

## 2018-09-18 RX ADMIN — Medication 1 TABLET(S): at 17:37

## 2018-09-18 RX ADMIN — ZINC SULFATE TAB 220 MG (50 MG ZINC EQUIVALENT) 220 MILLIGRAM(S): 220 (50 ZN) TAB at 17:37

## 2018-09-18 RX ADMIN — Medication 1 TABLET(S): at 11:38

## 2018-09-18 RX ADMIN — ENOXAPARIN SODIUM 30 MILLIGRAM(S): 100 INJECTION SUBCUTANEOUS at 11:38

## 2018-09-18 RX ADMIN — Medication 100 MILLIGRAM(S): at 22:00

## 2018-09-18 RX ADMIN — Medication 200 MILLIGRAM(S): at 06:20

## 2018-09-18 RX ADMIN — Medication 100 MILLIGRAM(S): at 13:39

## 2018-09-18 RX ADMIN — PANTOPRAZOLE SODIUM 40 MILLIGRAM(S): 20 TABLET, DELAYED RELEASE ORAL at 06:25

## 2018-09-18 RX ADMIN — Medication 1 APPLICATION(S): at 06:20

## 2018-09-18 RX ADMIN — LINEZOLID 600 MILLIGRAM(S): 600 INJECTION, SOLUTION INTRAVENOUS at 17:37

## 2018-09-18 RX ADMIN — Medication 75 MICROGRAM(S): at 06:21

## 2018-09-18 RX ADMIN — AMLODIPINE BESYLATE 2.5 MILLIGRAM(S): 2.5 TABLET ORAL at 06:20

## 2018-09-18 RX ADMIN — Medication 25 MILLIGRAM(S): at 06:21

## 2018-09-18 RX ADMIN — LINEZOLID 600 MILLIGRAM(S): 600 INJECTION, SOLUTION INTRAVENOUS at 06:20

## 2018-09-18 RX ADMIN — Medication 1 TABLET(S): at 08:23

## 2018-09-18 RX ADMIN — Medication 1 APPLICATION(S): at 22:00

## 2018-09-18 RX ADMIN — Medication 500 MILLIGRAM(S): at 11:38

## 2018-09-18 NOTE — CHART NOTE - NSCHARTNOTEFT_GEN_A_CORE
Assessment: patient seen with history dementia not interviewed at this time. up to 75% of meals recorded taken.     Factors impacting intake: [ ] none [ ] nausea  [ ] vomiting [ ] diarrhea [ ] constipation  [ ]chewing problems [x ] swallowing issues  [ ] other: diet as per speech language rx    Diet Presciption: Diet, Dysphagia 2 Mechanical Soft-Nectar Consistency Fluid (09-13-18 @ 15:40)    Intake: 20-75%    Current Weight: Weight (kg): 52.2 (09-13 @ 08:26)  % Weight Change    Pertinent Medications: MEDICATIONS  (STANDING):  amLODIPine   Tablet 2.5 milliGRAM(s) Oral daily  ascorbic acid 500 milliGRAM(s) Oral daily  BACItracin   Ointment 1 Application(s) Topical three times a day  cefpodoxime 200 milliGRAM(s) Oral every 12 hours  docusate sodium 100 milliGRAM(s) Oral three times a day  enoxaparin Injectable 30 milliGRAM(s) SubCutaneous daily  influenza   Vaccine 0.5 milliLiter(s) IntraMuscular once  lactobacillus acidophilus 1 Tablet(s) Oral two times a day with meals  levothyroxine 75 MICROGram(s) Oral daily  linezolid    Tablet 600 milliGRAM(s) Oral every 12 hours  Medihoney 1 Application(s) 1 Application(s) Topical daily  metoprolol succinate ER 25 milliGRAM(s) Oral daily  multivitamin 1 Tablet(s) Oral daily  pantoprazole    Tablet 40 milliGRAM(s) Oral two times a day  zinc sulfate 220 milliGRAM(s) Oral two times a day    MEDICATIONS  (PRN):  acetaminophen   Tablet .. 650 milliGRAM(s) Oral every 6 hours PRN Mild Pain (1 - 3)    Pertinent Labs: 09-16 Na139 mmol/L Glu 95 mg/dL K+ 4.1 mmol/L Cr  1.20 mg/dL BUN 18 mg/dL 09-12 Alb 1.4 g/dL<L>          Skin: tayler 15 DTI suspected rt heel    Estimated Needs:   [x ] no change since previous assessment  [ ] recalculated:     Previous Nutrition Diagnosis:      [x ] Increased Nutrient Needs( protein)     Nutrition Diagnosis is [x ] ongoing  [ ] resolved [ ] not applicable     New Nutrition Diagnosis: [x ] not applicable       Interventions:   Recommend  [ ] Change Diet To:  [ ] Nutrition Supplement  [ ] Nutrition Support  [x ] Other: continue diet as ordered. suggest add ensure pudding BID     Monitoring and Evaluation:   [x ] PO intake [ x ] Tolerance to diet prescription [ x ] weights [ x ] labs[ x ] follow up per protocol  [ ] other:

## 2018-09-18 NOTE — PROGRESS NOTE ADULT - ASSESSMENT
89 yo s/p i and d back abscess with scalp wound       change scalp wound to medihoney daily     cleared for d/c from surgical standpoint

## 2018-09-18 NOTE — PROGRESS NOTE ADULT - ASSESSMENT
89 yo f , with hx of chronic a fib , not on anticoag due to falls , age and risk of bleeds and recent GI bleed requiring transfusion , advance dementia , hypertension , osteoarthritis osteoporosis   patient was at rehab post recent hospitalizations for uti , and gi  bleed , treated conservatively with transfusion , and in rehab was noted to have a draining buttock abscess , and admitted for possible I and d ,  patient seen by surgery and will probably have i and d if family agreeable , but aware of high risk due to age and advance dentia and a fib ,   she is frail and weak , and does not talk much , sleeps mostly and is bed ridden and wheel chair bound and has functional quadriplegia ,   discussed with surgery and dtr ,    patient in optimal condition for I and D  of left buttock  ct buttock noted , awaiting i and d of abscess , anemic again , transfuse 2 units , Iv abx , dnr dni , d/w dtr and surgeon ,  dtr aware that patient elderly and with multiple medical issues and poor nutrition and high risk for decubes , and poor wound heeling ,   patient post I and D left buttock abcsess , and has packing , needs abx but she keeps pulling iv line , post 2 units bloods , will try po zyvox , and probiotics , and dc all iv meds ,   case d/w dtr , also provide surgical wound care , , advance diet , overall prognosis is poor ,   there is behavioral issues due to dementia will start aricept     patient afebrile , seems calm , eating better per staff , meds and labs reviewed ,   UTI , Johnson , will add po vantin , as she pulls out IV lines despite nursing interventions ,   wound culture from buttock , MRSA on zyvox po ,   plan dc to rehab ,   d/w dtr   on 9/18/18 patient more alert , vital signs stable , on left buttock wound care post I and D of abscess and surgical debridement , wbc wnl , on po zyvox for MRSA from wound , and on Vantin for uti , ,  turn and position per protocol , po feeding , left for head sutures removed , scab opened , and now with slough ,   plan dc to rehab when bed available , for now continue current therapy and probiotics , get out to chair and do therapy 89 yo f , with hx of chronic a fib , not on anticoag due to falls , age and risk of bleeds and recent GI bleed requiring transfusion , advance dementia , hypertension , osteoarthritis osteoporosis   patient was at rehab post recent hospitalizations for uti , and gi  bleed , treated conservatively with transfusion , and in rehab was noted to have a draining buttock abscess , and admitted for possible I and d ,  patient seen by surgery and will probably have i and d if family agreeable , but aware of high risk due to age and advance dentia and a fib ,   she is frail and weak , and does not talk much , sleeps mostly and is bed ridden and wheel chair bound and has functional quadriplegia ,   discussed with surgery and dtr ,    patient in optimal condition for I and D  of left buttock  ct buttock noted , awaiting i and d of abscess , anemic again , transfuse 2 units , Iv abx , dnr dni , d/w dtr and surgeon ,  dtr aware that patient elderly and with multiple medical issues and poor nutrition and high risk for decubes , and poor wound heeling ,   patient post I and D left buttock abcsess , and has packing , needs abx but she keeps pulling iv line , post 2 units bloods , will try po zyvox , and probiotics , and dc all iv meds ,   case d/w dtr , also provide surgical wound care , , advance diet , overall prognosis is poor ,   there is behavioral issues due to dementia will start aricept     patient afebrile , seems calm , eating better per staff , meds and labs reviewed ,   UTI , Noelelli , will add po vantin , as she pulls out IV lines despite nursing interventions ,   wound culture from buttock , MRSA on zyvox po ,   plan dc to rehab ,   d/w dtr   on 9/18/18 patient more alert , vital signs stable , on left buttock wound care post I and D of abscess and surgical debridement , wbc wnl , on po zyvox for MRSA from wound , and on Vantin for uti , ,  turn and position per protocol , po feeding , left for head sutures removed , scab opened , and now with slough ,   plan dc to rehab when bed available , for now continue current therapy and probiotics , get out to chair and do therapy   wound care per wound nurse :   · Notification Details(SBAR)                                                 Situation	Wound Consult	  · Assessment	Patient is a 89yo bedbound female presenting with surgical wound left buttock 2* to abscess s/p I&D wound bed is red non granulating 3 x 6 x 3 tunnels at 12:00 15cm to second open surgical wound. no s/s of discomfort or pain during exam and dressing application. Patients daughter at bedside	  · Recommendations	Cleanse with NS paint periwound with cavilon pack with aquacel, cover with dry dressing daily consider packing secondary wound with shira QOD	  · Action/Treatment Ordered:	MD made aware, telephone orders obtained, RN instructed in care of the patients skin and wound

## 2018-09-19 VITALS
OXYGEN SATURATION: 96 % | HEART RATE: 99 BPM | RESPIRATION RATE: 18 BRPM | DIASTOLIC BLOOD PRESSURE: 84 MMHG | TEMPERATURE: 97 F | SYSTOLIC BLOOD PRESSURE: 144 MMHG

## 2018-09-19 RX ADMIN — Medication 100 MILLIGRAM(S): at 06:42

## 2018-09-19 RX ADMIN — Medication 75 MICROGRAM(S): at 06:42

## 2018-09-19 RX ADMIN — Medication 1 APPLICATION(S): at 06:42

## 2018-09-19 RX ADMIN — LINEZOLID 600 MILLIGRAM(S): 600 INJECTION, SOLUTION INTRAVENOUS at 17:51

## 2018-09-19 RX ADMIN — Medication 200 MILLIGRAM(S): at 06:42

## 2018-09-19 RX ADMIN — Medication 25 MILLIGRAM(S): at 06:42

## 2018-09-19 RX ADMIN — ZINC SULFATE TAB 220 MG (50 MG ZINC EQUIVALENT) 220 MILLIGRAM(S): 220 (50 ZN) TAB at 06:42

## 2018-09-19 RX ADMIN — Medication 500 MILLIGRAM(S): at 11:43

## 2018-09-19 RX ADMIN — Medication 1 TABLET(S): at 11:43

## 2018-09-19 RX ADMIN — PANTOPRAZOLE SODIUM 40 MILLIGRAM(S): 20 TABLET, DELAYED RELEASE ORAL at 06:42

## 2018-09-19 RX ADMIN — Medication 1 APPLICATION(S): at 13:41

## 2018-09-19 RX ADMIN — PANTOPRAZOLE SODIUM 40 MILLIGRAM(S): 20 TABLET, DELAYED RELEASE ORAL at 17:51

## 2018-09-19 RX ADMIN — AMLODIPINE BESYLATE 2.5 MILLIGRAM(S): 2.5 TABLET ORAL at 06:42

## 2018-09-19 RX ADMIN — LINEZOLID 600 MILLIGRAM(S): 600 INJECTION, SOLUTION INTRAVENOUS at 06:42

## 2018-09-19 RX ADMIN — Medication 1 TABLET(S): at 08:37

## 2018-09-19 RX ADMIN — ZINC SULFATE TAB 220 MG (50 MG ZINC EQUIVALENT) 220 MILLIGRAM(S): 220 (50 ZN) TAB at 17:51

## 2018-09-19 RX ADMIN — Medication 200 MILLIGRAM(S): at 17:51

## 2018-09-19 RX ADMIN — Medication 1 TABLET(S): at 17:51

## 2018-09-19 RX ADMIN — ENOXAPARIN SODIUM 30 MILLIGRAM(S): 100 INJECTION SUBCUTANEOUS at 11:43

## 2018-09-19 NOTE — PROGRESS NOTE ADULT - SUBJECTIVE AND OBJECTIVE BOX
pt seen  confused, no complaints  ICU Vital Signs Last 24 Hrs  T(C): 36.2 (19 Sep 2018 05:51), Max: 36.3 (18 Sep 2018 13:02)  T(F): 97.2 (19 Sep 2018 05:51), Max: 97.3 (18 Sep 2018 13:02)  HR: 95 (19 Sep 2018 05:51) (95 - 95)  BP: 146/86 (19 Sep 2018 05:51) (129/84 - 146/86)  BP(mean): --  ABP: --  ABP(mean): --  RR: 17 (19 Sep 2018 05:51) (17 - 19)  SpO2: 97% (19 Sep 2018 05:51) (94% - 97%)  gen-NAD  forehead, slough  resp-clear   cvs-reg rate  abd-soft NT/ND  back, open, clean, minimal drainage
89y Female     T(C): 36.3 (09-14-18 @ 05:01), Max: 36.9 (09-13-18 @ 20:10)  HR: 75 (09-14-18 @ 05:01) (75 - 102)  BP: 122/86 (09-14-18 @ 05:01) (83/60 - 164/78)  RR: 18 (09-14-18 @ 05:01) (15 - 25)  SpO2: 97% (09-14-18 @ 05:01) (92% - 100%)  Wt(kg): --    Pt seen, doing well, no anesthesia complications or complaints noted or reported.   No Nausea  Pain well controlled
PCP  Subjective:   in bed , awake , seems calm , as per nursing , patient keeps pulling out IV line , yesterday she pulled 6 iv lines   and she has bacteriemia and needs abx ,   post I and D left buttock abscess    Objective:   Vital Signs Last 24 Hrs  T(C): 36.3 (18 @ 05:01), Max: 36.9 (18 @ 20:10)  T(F): 97.3 (18 @ 05:01), Max: 98.4 (18 @ 20:10)  HR: 75 (18 @ 05:01) (75 - 102)  BP: 122/86 (18 @ 05:01) (83/60 - 164/78)  BP(mean): --  RR: 18 (18 @ 05:01) (15 - 25)  SpO2: 97% (18 @ 05:01) (92% - 100%)  Daily     Daily        Physical Exam:  · Constitutional	Well-developed, well nourished	  · Eyes	EOMI; PERRL; no drainage or redness	  · ENMT	No oral lesions; no gross abnormalities	  · Neck	No bruits; no thyromegaly or nodules	  · Respiratory	Breath Sounds equal & clear to percussion & auscultation, no accessory muscle use	  · Cardiovascular	detailed exam	  · Cardiovascular Details	irregular rate and rhythm; positive S1; positive S2	  · Gastrointestinal	Soft, non-tender, no hepatosplenomegaly, normal bowel sounds	  · Extremities	No cyanosis, clubbing or edema	  · Neurological	detailed exam	  · Neurological Details	disoriented; strength decreased	  · Mental Status	patient with dementia , and weakness , and non verbal mostly and stiffness and bed ridden	  · Motor	grossly intact	  · Gait/Balance	poor balance	  · Neurological Comments	confused , date , place	  · Skin	detailed exam	  · Skin Details	warm and dry	  · Scars	location, left for head sutures	  · Skin Comments	Brawny edematous and red/warm left gluteal region with central ulcer with yellowish drainage.	  · Musculoskeletal	No joint pain, swelling or deformity; no limitation of movement	  · Psychiatric	detailed exam	  · Psychiatric Details	depressed; flat affect	    left buttock packing and bandage     Allergies: Allergies    No Known Allergies    Intolerances        Home Medications:  acetaminophen 325 mg oral tablet: 2 tab(s) orally every 8 hours, As Needed -pain/ back (12 Sep 2018 21:53)  Bacid (LAC) oral tablet: 1 tab(s) orally 2 times a day( for ten days) (12 Sep 2018 21:53)  Betadine 10% topical solution: Apply topically to affected area once a day(to left foread) (12 Sep 2018 21:53)  Cranberry oral tablet: 450 milligram(s) orally 2 times a day (12 Sep 2018 21:53)  docusate sodium 100 mg oral capsule: 2 cap(s) orally once a day (at bedtime) (12 Sep 2018 21:53)  ferrous sulfate 300 mg/5 mL (60 mg elemental iron) oral liquid: 60 milligram(s) orally 2 times a day (12 Sep 2018 21:53)  ipratropium-albuterol 0.5 mg-2.5 mg/3 mLinhalation solution: 1 dose(s) inhaled every 12 hours (12 Sep 2018 21:53)  Lasix 20 mg oral tablet: 1 tab(s) orally once a day (12 Sep 2018 21:53)  Levaquin 500 mg oral tablet: 1 tab(s) orally every 24 hours @8PM for five days started 9/10/18 (12 Sep 2018 21:53)  levothyroxine: 75 milligram(s) orally once a day (12 Sep 2018 21:53)  Multiple Vitamins with Minerals oral tablet: 1 tab(s) orally once a day (12 Sep 2018 21:53)  RABEprazole 20 mg oral tablet, extended release: 1 tab(s) orally 3 times a week ( SAT@9AM) (12 Sep 2018 21:53)  Santyl: Apply topically to affected area once a day(to left buttock) (12 Sep 2018 21:53)  Vitamin B12 100 mcg oral tablet: 1 tab(s) orally once a day (12 Sep 2018 21:53)  Vitamin D3 1000 intl units oral tablet: 1 tab(s) orally once a day (12 Sep 2018 21:53)  zinc sulfate 220 mg oral capsule: 1 cap(s) orally once a day (12 Sep 2018 21:53)    Medications:   acetaminophen   Tablet .. 650 milliGRAM(s) Oral every 6 hours PRN  docusate sodium 100 milliGRAM(s) Oral three times a day  enoxaparin Injectable 30 milliGRAM(s) SubCutaneous daily  lactobacillus acidophilus 1 Tablet(s) Oral two times a day with meals  levothyroxine 75 MICROGram(s) Oral daily  linezolid    Tablet 600 milliGRAM(s) Oral every 12 hours  metoprolol succinate ER 25 milliGRAM(s) Oral daily  pantoprazole  Injectable 40 milliGRAM(s) IV Push daily      LABS:                        6.7    11.82 )-----------( 398      ( 13 Sep 2018 09:16 )             20.7         138  |  106  |  33<H>  ----------------------------<  142<H>  4.2   |  23  |  1.30    Ca    7.7<L>      12 Sep 2018 13:06    TPro  5.0<L>  /  Alb  1.4<L>  /  TBili  0.4  /  DBili  x   /  AST  18  /  ALT  20  /  AlkPhos  104  12    PT/INR - ( 12 Sep 2018 11:47 )   PT: 13.3 sec;   INR: 1.22 ratio         PTT - ( 12 Sep 2018 11:47 )  PTT:26.9 sec   @ 11:47  INR 1.22    Urinalysis Basic - ( 12 Sep 2018 11:08 )    Color: Yellow / Appearance: Slightly Turbid / S.005 / pH: x  Gluc: x / Ketone: Negative  / Bili: Negative / Urobili: Negative   Blood: x / Protein: 25 mg/dL / Nitrite: Positive   Leuk Esterase: Moderate / RBC: 6-10 /HPF / WBC 26-50   Sq Epi: x / Non Sq Epi: Few / Bacteria: Many        CAPILLARY BLOOD GLUCOSE              RECENT CULTURES:  Culture Results:   Numerous Gram Negative Rods  Numerous Staphylococcus aureus ( @ 09:39)  Culture Results:   >100,000 CFU/ml Morganella morganii ( @ 16:51)  Culture Results:   Growth in aerobic bottle: Gram Positive Cocci in Clusters ( @ 15:33)  Culture Results:   Growth in aerobic bottle: Gram Positive Cocci in Clusters  "Due to technical problems, Proteus sp. will Not be reported as part of  the BCID panel until further notice"  ***Blood Panel PCR results on this specimen are available  approximately 3 hours after the Gram stain result.***  Gram stain, PCR, and/or culture results may not always  correspond due to difference in methodologies.  ************************************************************  This PCR assay was performed using [employer].  The following targets are tested for: Enterococcus,  vancomycin resistant enterococci, Listeria monocytogenes,  coagulase negative staphylococci, S. aureus,  methicillin resistant S. aureus, Streptococcus agalactiae  (Group B), S. pneumoniae, S.pyogenes (Group A),  Acinetobacter baumannii, Enterobacter cloacae, E. coli,  Klebsiella oxytoca, K. pneumoniae, Proteus sp.,  Serratia marcescens, Haemophilus influenzae,  Neisseria meningitidis, Pseudomonas aeruginosa, Candida  albicans, C. glabrata, C krusei, C parapsilosis,  C. tropicalis and the KPC resistance gene. ( @ 15:33)        Culture - Other (collected 18 @ 09:39)  Source: .Other wound  Preliminary Report (18 @ 07:53):    Numerous Gram Negative Rods    Numerous Staphylococcus aureus    Culture - Urine (collected 18 @ 16:51)  Source: .Urine Clean Catch (Midstream)  Preliminary Report (18 @ 17:29):    >100,000 CFU/ml Morganella morganii    Culture - Blood (collected 18 @ 15:33)  Source: .Blood Blood-Peripheral  Gram Stain (18 @ 22:35):    Growth in aerobic bottle: Gram Positive Cocci in Clusters  Preliminary Report (18 @ 22:35):    Growth in aerobic bottle: Gram Positive Cocci in Clusters    "Due to technical problems, Proteus sp. will Not be reported as part of    the BCID panel until further notice"    ***Blood Panel PCR results on this specimen are available    approximately 3 hours after the Gram stain result.***    Gram stain, PCR, and/or culture results may not always    correspond due to difference in methodologies.    ************************************************************    This PCR assay was performed using [employer].    The following targets are tested for: Enterococcus,    vancomycin resistant enterococci, Listeria monocytogenes,    coagulase negative staphylococci, S. aureus,    methicillin resistant S. aureus, Streptococcus agalactiae    (Group B), S. pneumoniae, S.pyogenes (Group A),    Acinetobacter baumannii, Enterobacter cloacae, E. coli,    Klebsiella oxytoca, K. pneumoniae, Proteus sp.,    Serratia marcescens, Haemophilus influenzae,    Neisseria meningitidis, Pseudomonas aeruginosa, Candida    albicans, C. glabrata, C krusei, C parapsilosis,    C. tropicalis and the KPC resistance gene.  Organism: Blood Culture PCR (18 @ 23:53)  Organism: Blood Culture PCR (18 @ 23:53)      -  Coagulase negative Staphylococcus: Detec      Method Type: PCR    Culture - Blood (collected 18 @ 15:33)  Source: .Blood Blood-Peripheral  Gram Stain (18 @ 00:41):    Growth in aerobic bottle: Gram Positive Cocci in Clusters  Preliminary Report (18 @ 00:41):    Growth in aerobic bottle: Gram Positive Cocci in Clusters
PCP  Subjective:   in bed , awake , seems strong in arms , confused ,     Objective:   Vital Signs Last 24 Hrs  T(C): 36.4 (09-17-18 @ 04:54), Max: 36.5 (09-16-18 @ 21:16)  T(F): 97.5 (09-17-18 @ 04:54), Max: 97.7 (09-16-18 @ 21:16)  HR: 95 (09-17-18 @ 04:54) (95 - 117)  BP: 139/88 (09-17-18 @ 04:54) (122/76 - 143/84)  BP(mean): --  RR: 17 (09-17-18 @ 04:54) (16 - 17)  SpO2: 95% (09-17-18 @ 04:54) (94% - 95%)  Daily     Daily     GENERAL:  wdwn female , awake , seems calm    EYES: eomi , perlla  NECK: supple no mass  CHEST/LUNG: clear   HEART: s1 s2 irregularly irregular 2/6 justine   ABDOMEN:  soft nt + bs , no mass  EXTREMITIES:  no edema , left buttock bandage   SKIN:  left for head gash , sutures removed , scab ,   left buttock post I and D bandaged , soft some redness in sacrum  CNS:  awake , alert , non focal , very stiff in general , confused , scared ,     Allergies: Allergies    No Known Allergies    Intolerances        Home Medications:  acetaminophen 325 mg oral tablet: 2 tab(s) orally every 8 hours, As Needed -pain/ back (12 Sep 2018 21:53)  amLODIPine 2.5 mg oral tablet: 1 tab(s) orally once a day (16 Sep 2018 18:14)  Aricept 5 mg oral tablet: 1 tab(s) orally once a day (at bedtime) (16 Sep 2018 18:24)  ascorbic acid 500 mg oral tablet: 1 tab(s) orally once a day (16 Sep 2018 18:14)  Bacid (LAC) oral tablet: 1 tab(s) orally 2 times a day( for ten days) (12 Sep 2018 21:53)  Bactrim  mg-160 mg oral tablet: 1 tab(s) orally 2 times a day  x 1 week  (16 Sep 2018 18:14)  Betadine 10% topical solution: Apply topically to affected area once a day(to left foread) (12 Sep 2018 21:53)  Cranberry oral tablet: 450 milligram(s) orally 2 times a day (12 Sep 2018 21:53)  docusate sodium 100 mg oral capsule: 2 cap(s) orally once a day (at bedtime) (12 Sep 2018 21:53)  enoxaparin: 30 milligram(s) subcutaneously once a day (16 Sep 2018 18:07)  ferrous sulfate 300 mg/5 mL (60 mg elemental iron) oral liquid: 60 milligram(s) orally 2 times a day (12 Sep 2018 21:53)  ipratropium-albuterol 0.5 mg-2.5 mg/3 mLinhalation solution: 1 dose(s) inhaled every 12 hours (12 Sep 2018 21:53)  levothyroxine: 75 milligram(s) orally once a day (12 Sep 2018 21:53)  Multiple Vitamins with Minerals oral tablet: 1 tab(s) orally once a day (12 Sep 2018 21:53)  pantoprazole 40 mg oral delayed release tablet: 1 tab(s) orally 2 times a day (16 Sep 2018 18:14)  Santyl: Apply topically to affected area once a day(to left buttock) (12 Sep 2018 21:53)  Vitamin B12 100 mcg oral tablet: 1 tab(s) orally once a day (12 Sep 2018 21:53)  Vitamin D3 1000 intl units oral tablet: 1 tab(s) orally once a day (12 Sep 2018 21:53)  zinc sulfate 220 mg oral capsule: 1 cap(s) orally once a day (12 Sep 2018 21:53)    Medications:   acetaminophen   Tablet .. 650 milliGRAM(s) Oral every 6 hours PRN  amLODIPine   Tablet 2.5 milliGRAM(s) Oral daily  ascorbic acid 500 milliGRAM(s) Oral daily  BACItracin   Ointment 1 Application(s) Topical three times a day  docusate sodium 100 milliGRAM(s) Oral three times a day  enoxaparin Injectable 30 milliGRAM(s) SubCutaneous daily  influenza   Vaccine 0.5 milliLiter(s) IntraMuscular once  lactobacillus acidophilus 1 Tablet(s) Oral two times a day with meals  levothyroxine 75 MICROGram(s) Oral daily  linezolid    Tablet 600 milliGRAM(s) Oral every 12 hours  metoprolol succinate ER 25 milliGRAM(s) Oral daily  multivitamin 1 Tablet(s) Oral daily  pantoprazole    Tablet 40 milliGRAM(s) Oral two times a day  zinc sulfate 220 milliGRAM(s) Oral two times a day      LABS:                        10.5   6.59  )-----------( 476      ( 16 Sep 2018 07:42 )             31.9     09-16    139  |  109<H>  |  18  ----------------------------<  95  4.1   |  21<L>  |  1.20    Ca    8.2<L>      16 Sep 2018 07:42        09-12 @ 11:47  INR 1.22        CAPILLARY BLOOD GLUCOSE              RECENT CULTURES:  Culture Results:   Numerous Methicillin resistant Staphylococcus aureus (09-13 @ 21:59)  Culture Results:   Numerous Three or more mixed gram negative rods  including Morganella morganii "Susceptibilities not performed"  Numerous Methicillin resistant Staphylococcus aureus (09-13 @ 09:39)  Culture Results:   >100,000 CFU/ml Morganella morganii (09-12 @ 16:51)  Culture Results:   Growth in aerobic bottle: Staphylococcus epidermidis (09-12 @ 15:33)  Culture Results:   Growth in aerobic and anaerobic bottles: Staphylococcus epidermidis  "Due to technical problems, Proteus sp. will Not be reported as part of  the BCID panel until further notice"  ***Blood Panel PCR results on this specimen are available  approximately 3 hours after the Gram stain result.***  Gram stain, PCR, and/or culture results may not always  correspond due to difference in methodologies.  ************************************************************  This PCR assay was performed using [employer].  The following targets are tested for: Enterococcus,  vancomycin resistant enterococci, Listeria monocytogenes,  coagulase negative staphylococci, S. aureus,  methicillin resistant S. aureus, Streptococcus agalactiae  (Group B), S. pneumoniae, S. pyogenes (Group A),  Acinetobacter baumannii, Enterobacter cloacae, E. coli,  Klebsiella oxytoca, K. pneumoniae, Proteus sp.,  Serratia marcescens, Haemophilus influenzae,  Neisseria meningitidis, Pseudomonas aeruginosa, Candida  albicans, C. glabrata, C krusei, C parapsilosis,  C. tropicalis and the KPC resistance gene. (09-12 @ 15:33)        Culture - Surgical Swab (collected 09-13-18 @ 21:59)  Source: .Surgical Swab lower back abscess c/s  Preliminary Report (09-15-18 @ 17:43):    Numerous Methicillin resistant Staphylococcus aureus  Organism: Methicillin resistant Staphylococcus aureus (09-15-18 @ 17:42)  Organism: Methicillin resistant Staphylococcus aureus (09-15-18 @ 17:42)      -  Ampicillin/Sulbactam: R 16/8      -  Cefazolin: R 8      -  Clindamycin: S 0.5      -  Daptomycin: S 0.5      -  Erythromycin: R >4      -  Gentamicin: S <=1 Should not be used as monotherapy      -  Linezolid: S 4      -  Oxacillin: R >2      -  Penicillin: R >8      -  RIF- Rifampin: S <=1 Should not be used as monotherapy      -  Tetra/Doxy: S <=1      -  Trimethoprim/Sulfamethoxazole: S <=0.5/9.5      -  Vancomycin: S 2      Method Type: LEVI    Culture - Other (collected 09-13-18 @ 09:39)  Source: .Other wound  Final Report (09-15-18 @ 14:13):    Numerous Three or more mixed gram negative rods    including Morganella morganii "Susceptibilities not performed"    Numerous Methicillin resistant Staphylococcus aureus  Organism: Methicillin resistant Staphylococcus aureus (09-15-18 @ 14:13)  Organism: Methicillin resistant Staphylococcus aureus (09-15-18 @ 14:13)      -  Ampicillin/Sulbactam: R <=8/4      -  Cefazolin: R 8      -  Clindamycin: S <=0.25      -  Daptomycin: S 0.5      -  Erythromycin: R >4      -  Gentamicin: S <=1 Should not be used as monotherapy      -  Linezolid: S 2      -  Oxacillin: R >2      -  Penicillin: R >8      -  RIF- Rifampin: S <=1 Should not be used as monotherapy      -  Tetra/Doxy: S <=1      -  Trimethoprim/Sulfamethoxazole: S <=0.5/9.5      -  Vancomycin: S 2      Method Type: LEVI    Culture - Urine (collected 09-12-18 @ 16:51)  Source: .Urine Clean Catch (Midstream)  Final Report (09-14-18 @ 18:02):    >100,000 CFU/ml Morganella morganii  Organism: Morganella morganii (09-14-18 @ 18:02)  Organism: Morganella morganii (09-14-18 @ 18:02)      -  Amikacin: S <=8      -  Amoxicillin/Clavulanic Acid: R >16/8      -  Ampicillin: R >16 These ampicillin results predict results for amoxicillin      -  Ampicillin/Sulbactam: R >16/8      -  Aztreonam: S <=4      -  Cefazolin: R >16      -  Cefepime: S <=2      -  Cefotaxime: S 8      -  Cefoxitin: R >16      -  Ceftazidime: S 8      -  Ceftriaxone: S <=1 Enterobacter, Citrobacter, and Serratia may develop resistance during prolonged therapy      -  Cefuroxime: R >16      -  Cephalothin: R >16      -  Ciprofloxacin: R >2      -  Ertapenem: S <=0.5      -  Gentamicin: R >8      -  Imipenem: R 4      -  Levofloxacin: R >4      -  Meropenem: S <=1      -  Nitrofurantoin: R >64 Should not be used to treat pyelonephritis      -  Piperacillin/Tazobactam: S 16      -  Tetra/Doxy: R 8      -  Tigecycline: R <=1      -  Tobramycin: I 8      -  Trimethoprim/Sulfamethoxazole: R >2/38      Method Type: LEVI    Culture - Blood (collected 09-12-18 @ 15:33)  Source: .Blood Blood-Peripheral  Gram Stain (09-15-18 @ 00:40):    Growth in aerobic bottle: Gram Positive Cocci in Clusters    Growth in anaerobic bottle: Gram Positive Cocci in Clusters  Final Report (09-15-18 @ 16:16):    Growth in aerobic and anaerobic bottles: Staphylococcus epidermidis    "Due to technical problems, Proteus sp. will Not be reported as part of    the BCID panel until further notice"    ***Blood Panel PCR results on this specimen are available    approximately 3 hours after the Gram stain result.***    Gram stain, PCR, and/or culture results may not always    correspond due to difference in methodologies.    ************************************************************    This PCR assay was performed using [employer].    The following targets are tested for: Enterococcus,    vancomycin resistant enterococci, Listeria monocytogenes,    coagulase negative staphylococci, S. aureus,    methicillin resistant S. aureus, Streptococcus agalactiae    (Group B), S. pneumoniae, S. pyogenes (Group A),    Acinetobacter baumannii, Enterobacter cloacae, E. coli,    Klebsiella oxytoca, K. pneumoniae, Proteus sp.,    Serratia marcescens, Haemophilus influenzae,    Neisseria meningitidis, Pseudomonas aeruginosa, Candida    albicans, C. glabrata, C krusei, C parapsilosis,    C. tropicalis and the KPC resistance gene.  Organism: Blood Culture PCR (09-15-18 @ 16:16)  Organism: Blood Culture PCR (09-15-18 @ 16:16)      -  Coagulase negative Staphylococcus: Detec      Method Type: PCR    Culture - Blood (collected 09-12-18 @ 15:33)  Source: .Blood Blood-Peripheral  Gram Stain (09-14-18 @ 00:41):    Growth in aerobic bottle: Gram Positive Cocci in Clusters  Final Report (09-15-18 @ 22:09):    Growth in aerobic bottle: Staphylococcus epidermidis  Organism: Coag Negative Staphylococcus (09-15-18 @ 22:09)  Organism: Coag Negative Staphylococcus (09-15-18 @ 22:09)      -  Ampicillin/Sulbactam: R <=8/4      -  Cefazolin: R <=4      -  Clindamycin: R >4      -  Erythromycin: R >4      -  Gentamicin: R >8 Should not be used as monotherapy      -  Oxacillin: R >2      -  Penicillin: R 4      -  RIF- Rifampin: S <=1 Should not be used as monotherapy      -  Tetra/Doxy: S <=1      -  Trimethoprim/Sulfamethoxazole: R >2/38      -  Vancomycin: S 2      Method Type: LEVI
PCP  Subjective:   in bed , more awake , follows simple commands , denies pain , confused , general stiffness    Objective:   Vital Signs Last 24 Hrs  T(C): 36.8 (09-18-18 @ 05:05), Max: 36.8 (09-18-18 @ 05:05)  T(F): 98.2 (09-18-18 @ 05:05), Max: 98.2 (09-18-18 @ 05:05)  HR: 85 (09-18-18 @ 05:05) (85 - 98)  BP: 121/76 (09-18-18 @ 05:05) (121/76 - 148/90)  BP(mean): --  RR: 18 (09-18-18 @ 05:05) (16 - 18)  SpO2: 93% (09-18-18 @ 05:05) (93% - 97%)  Daily     Daily     GENERAL:  wdwn female , awake , seems calm    EYES: eomi , perlla  NECK: supple no mass  CHEST/LUNG: clear   HEART: s1 s2 irregularly irregular 2/6 justine   ABDOMEN:  soft nt + bs , no mass  EXTREMITIES:  no edema , left buttock bandage , and packing ,   SKIN:  left for head gash , sutures removed , scab and some slough,   left buttock post I and D bandaged , soft some redness in sacrum  CNS:  more alert , confused , stiffness , follows simple commands and moves all extremities , speech is decent       Allergies: Allergies    No Known Allergies    Intolerances        Home Medications:  acetaminophen 325 mg oral tablet: 2 tab(s) orally every 8 hours, As Needed -pain/ back (12 Sep 2018 21:53)  amLODIPine 2.5 mg oral tablet: 1 tab(s) orally once a day (16 Sep 2018 18:14)  Aricept 5 mg oral tablet: 1 tab(s) orally once a day (at bedtime) (16 Sep 2018 18:24)  ascorbic acid 500 mg oral tablet: 1 tab(s) orally once a day (16 Sep 2018 18:14)  Bacid (LAC) oral tablet: 1 tab(s) orally 2 times a day( for ten days) (12 Sep 2018 21:53)  Bactrim  mg-160 mg oral tablet: 1 tab(s) orally 2 times a day  x 1 week  (16 Sep 2018 18:14)  Betadine 10% topical solution: Apply topically to affected area once a day(to left foread) (12 Sep 2018 21:53)  cefpodoxime 200 mg oral tablet: 1 tab(s) orally every 12 hours  x 1 week  (17 Sep 2018 07:58)  Cranberry oral tablet: 450 milligram(s) orally 2 times a day (12 Sep 2018 21:53)  docusate sodium 100 mg oral capsule: 2 cap(s) orally once a day (at bedtime) (12 Sep 2018 21:53)  enoxaparin: 30 milligram(s) subcutaneously once a day (16 Sep 2018 18:07)  ferrous sulfate 300 mg/5 mL (60 mg elemental iron) oral liquid: 60 milligram(s) orally 2 times a day (12 Sep 2018 21:53)  ipratropium-albuterol 0.5 mg-2.5 mg/3 mLinhalation solution: 1 dose(s) inhaled every 12 hours (12 Sep 2018 21:53)  levothyroxine: 75 milligram(s) orally once a day (12 Sep 2018 21:53)  Multiple Vitamins with Minerals oral tablet: 1 tab(s) orally once a day (12 Sep 2018 21:53)  pantoprazole 40 mg oral delayed release tablet: 1 tab(s) orally 2 times a day (16 Sep 2018 18:14)  Santyl: Apply topically to affected area once a day(to left buttock) (12 Sep 2018 21:53)  Vitamin B12 100 mcg oral tablet: 1 tab(s) orally once a day (12 Sep 2018 21:53)  Vitamin D3 1000 intl units oral tablet: 1 tab(s) orally once a day (12 Sep 2018 21:53)  zinc sulfate 220 mg oral capsule: 1 cap(s) orally once a day (12 Sep 2018 21:53)    Medications:   acetaminophen   Tablet .. 650 milliGRAM(s) Oral every 6 hours PRN  amLODIPine   Tablet 2.5 milliGRAM(s) Oral daily  ascorbic acid 500 milliGRAM(s) Oral daily  BACItracin   Ointment 1 Application(s) Topical three times a day  cefpodoxime 200 milliGRAM(s) Oral every 12 hours  docusate sodium 100 milliGRAM(s) Oral three times a day  enoxaparin Injectable 30 milliGRAM(s) SubCutaneous daily  influenza   Vaccine 0.5 milliLiter(s) IntraMuscular once  lactobacillus acidophilus 1 Tablet(s) Oral two times a day with meals  levothyroxine 75 MICROGram(s) Oral daily  linezolid    Tablet 600 milliGRAM(s) Oral every 12 hours  metoprolol succinate ER 25 milliGRAM(s) Oral daily  multivitamin 1 Tablet(s) Oral daily  pantoprazole    Tablet 40 milliGRAM(s) Oral two times a day  zinc sulfate 220 milliGRAM(s) Oral two times a day      LABS:                        11.8   8.57  )-----------( 593      ( 17 Sep 2018 08:57 )             37.0                   CAPILLARY BLOOD GLUCOSE              RECENT CULTURES:  Culture Results:   Numerous Methicillin resistant Staphylococcus aureus (09-13 @ 21:59)  Culture Results:   Numerous Three or more mixed gram negative rods  including Morganella morganii "Susceptibilities not performed"  Numerous Methicillin resistant Staphylococcus aureus (09-13 @ 09:39)  Culture Results:   >100,000 CFU/ml Morganella morganii (09-12 @ 16:51)  Culture Results:   Growth in aerobic bottle: Staphylococcus epidermidis (09-12 @ 15:33)  Culture Results:   Growth in aerobic and anaerobic bottles: Staphylococcus epidermidis  "Due to technical problems, Proteus sp. will Not be reported as part of  the BCID panel until further notice"  ***Blood Panel PCR results on this specimen are available  approximately 3 hours after the Gram stain result.***  Gram stain, PCR, and/or culture results may not always  correspond due to difference in methodologies.  ************************************************************  This PCR assay was performed using VFA.  The following targets are tested for: Enterococcus,  vancomycin resistant enterococci, Listeria monocytogenes,  coagulase negative staphylococci, S. aureus,  methicillin resistant S. aureus, Streptococcus agalactiae  (Group B), S. pneumoniae, S. pyogenes (Group A),  Acinetobacter baumannii, Enterobacter cloacae, E. coli,  Klebsiella oxytoca, K. pneumoniae, Proteus sp.,  Serratia marcescens, Haemophilus influenzae,  Neisseria meningitidis, Pseudomonas aeruginosa, Candida  albicans, C. glabrata, C krusei, C parapsilosis,  C. tropicalis and the KPC resistance gene. (09-12 @ 15:33)        Culture - Surgical Swab (collected 09-13-18 @ 21:59)  Source: .Surgical Swab lower back abscess c/s  Preliminary Report (09-15-18 @ 17:43):    Numerous Methicillin resistant Staphylococcus aureus  Organism: Methicillin resistant Staphylococcus aureus (09-15-18 @ 17:42)  Organism: Methicillin resistant Staphylococcus aureus (09-15-18 @ 17:42)      -  Ampicillin/Sulbactam: R 16/8      -  Cefazolin: R 8      -  Clindamycin: S 0.5      -  Daptomycin: S 0.5      -  Erythromycin: R >4      -  Gentamicin: S <=1 Should not be used as monotherapy      -  Linezolid: S 4      -  Oxacillin: R >2      -  Penicillin: R >8      -  RIF- Rifampin: S <=1 Should not be used as monotherapy      -  Tetra/Doxy: S <=1      -  Trimethoprim/Sulfamethoxazole: S <=0.5/9.5      -  Vancomycin: S 2      Method Type: LEVI    Culture - Other (collected 09-13-18 @ 09:39)  Source: .Other wound  Final Report (09-15-18 @ 14:13):    Numerous Three or more mixed gram negative rods    including Morganella morganii "Susceptibilities not performed"    Numerous Methicillin resistant Staphylococcus aureus  Organism: Methicillin resistant Staphylococcus aureus (09-15-18 @ 14:13)  Organism: Methicillin resistant Staphylococcus aureus (09-15-18 @ 14:13)      -  Ampicillin/Sulbactam: R <=8/4      -  Cefazolin: R 8      -  Clindamycin: S <=0.25      -  Daptomycin: S 0.5      -  Erythromycin: R >4      -  Gentamicin: S <=1 Should not be used as monotherapy      -  Linezolid: S 2      -  Oxacillin: R >2      -  Penicillin: R >8      -  RIF- Rifampin: S <=1 Should not be used as monotherapy      -  Tetra/Doxy: S <=1      -  Trimethoprim/Sulfamethoxazole: S <=0.5/9.5      -  Vancomycin: S 2      Method Type: LEVI    Culture - Urine (collected 09-12-18 @ 16:51)  Source: .Urine Clean Catch (Midstream)  Final Report (09-14-18 @ 18:02):    >100,000 CFU/ml Morganella morganii  Organism: Morganella morganii (09-14-18 @ 18:02)  Organism: Morganella morganii (09-14-18 @ 18:02)      -  Amikacin: S <=8      -  Amoxicillin/Clavulanic Acid: R >16/8      -  Ampicillin: R >16 These ampicillin results predict results for amoxicillin      -  Ampicillin/Sulbactam: R >16/8      -  Aztreonam: S <=4      -  Cefazolin: R >16      -  Cefepime: S <=2      -  Cefotaxime: S 8      -  Cefoxitin: R >16      -  Ceftazidime: S 8      -  Ceftriaxone: S <=1 Enterobacter, Citrobacter, and Serratia may develop resistance during prolonged therapy      -  Cefuroxime: R >16      -  Cephalothin: R >16      -  Ciprofloxacin: R >2      -  Ertapenem: S <=0.5      -  Gentamicin: R >8      -  Imipenem: R 4      -  Levofloxacin: R >4      -  Meropenem: S <=1      -  Nitrofurantoin: R >64 Should not be used to treat pyelonephritis      -  Piperacillin/Tazobactam: S 16      -  Tetra/Doxy: R 8      -  Tigecycline: R <=1      -  Tobramycin: I 8      -  Trimethoprim/Sulfamethoxazole: R >2/38      Method Type: LEVI    Culture - Blood (collected 09-12-18 @ 15:33)  Source: .Blood Blood-Peripheral  Gram Stain (09-15-18 @ 00:40):    Growth in aerobic bottle: Gram Positive Cocci in Clusters    Growth in anaerobic bottle: Gram Positive Cocci in Clusters  Final Report (09-15-18 @ 16:16):    Growth in aerobic and anaerobic bottles: Staphylococcus epidermidis    "Due to technical problems, Proteus sp. will Not be reported as part of    the BCID panel until further notice"    ***Blood Panel PCR results on this specimen are available    approximately 3 hours after the Gram stain result.***    Gram stain, PCR, and/or culture results may not always    correspond due to difference in methodologies.    ************************************************************    This PCR assay was performed using VFA.    The following targets are tested for: Enterococcus,    vancomycin resistant enterococci, Listeria monocytogenes,    coagulase negative staphylococci, S. aureus,    methicillin resistant S. aureus, Streptococcus agalactiae    (Group B), S. pneumoniae, S. pyogenes (Group A),    Acinetobacter baumannii, Enterobacter cloacae, E. coli,    Klebsiella oxytoca, K. pneumoniae, Proteus sp.,    Serratia marcescens, Haemophilus influenzae,    Neisseria meningitidis, Pseudomonas aeruginosa, Candida    albicans, C. glabrata, C krusei, C parapsilosis,    C. tropicalis and the KPC resistance gene.  Organism: Blood Culture PCR (09-15-18 @ 16:16)  Organism: Blood Culture PCR (09-15-18 @ 16:16)      -  Coagulase negative Staphylococcus: Detec      Method Type: PCR    Culture - Blood (collected 09-12-18 @ 15:33)  Source: .Blood Blood-Peripheral  Gram Stain (09-14-18 @ 00:41):    Growth in aerobic bottle: Gram Positive Cocci in Clusters  Final Report (09-15-18 @ 22:09):    Growth in aerobic bottle: Staphylococcus epidermidis  Organism: Coag Negative Staphylococcus (09-15-18 @ 22:09)  Organism: Coag Negative Staphylococcus (09-15-18 @ 22:09)      -  Ampicillin/Sulbactam: R <=8/4      -  Cefazolin: R <=4      -  Clindamycin: R >4      -  Erythromycin: R >4      -  Gentamicin: R >8 Should not be used as monotherapy      -  Oxacillin: R >2      -  Penicillin: R 4      -  RIF- Rifampin: S <=1 Should not be used as monotherapy      -  Tetra/Doxy: S <=1      -  Trimethoprim/Sulfamethoxazole: R >2/38      -  Vancomycin: S 2      Method Type: LEVI
PCP  Subjective:   in bed hold area awiting blood transfusion , on iv abx , awaiting I and D of left Buttock abcess    Objective:   Vital Signs Last 24 Hrs  T(C): 36.5 (18 @ 09:35), Max: 38.5 (18 @ 10:59)  T(F): 97.7 (18 @ 09:35), Max: 101.3 (18 @ 10:59)  HR: 90 (18 @ 09:35) (87 - 114)  BP: 117/56 (18 @ 09:35) (107/69 - 146/81)  BP(mean): --  RR: 18 (18 @ 09:35) (16 - 18)  SpO2: 97% (18 @ 09:35) (94% - 98%)  Daily Height in cm: 154.94 (13 Sep 2018 08:26)    Daily      Physical Exam:  · Constitutional	Well-developed, well nourished	  · Eyes	EOMI; PERRL; no drainage or redness	  · ENMT	No oral lesions; no gross abnormalities	  · Neck	No bruits; no thyromegaly or nodules	  · Respiratory	Breath Sounds equal & clear to percussion & auscultation, no accessory muscle use	  · Cardiovascular	detailed exam	  · Cardiovascular Details	irregular rate and rhythm; positive S1; positive S2	  · Gastrointestinal	Soft, non-tender, no hepatosplenomegaly, normal bowel sounds	  · Extremities	No cyanosis, clubbing or edema	  · Neurological	detailed exam	  · Neurological Details	disoriented; strength decreased	  · Mental Status	patient with dementia , and weakness , and non verbal mostly and stiffness and bed ridden	  · Motor	grossly intact	  · Gait/Balance	poor balance	  · Neurological Comments	confused , date , place	  · Skin	detailed exam	  · Skin Details	warm and dry	  · Scars	location, left for head sutures	  · Skin Comments	Brawny edematous and red/warm left gluteal region with central ulcer with yellowish drainage.	  · Musculoskeletal	No joint pain, swelling or deformity; no limitation of movement	  · Psychiatric	detailed exam	  · Psychiatric Details	depressed; flat affect	        Allergies: Allergies    No Known Allergies    Intolerances        Home Medications:  acetaminophen 325 mg oral tablet: 2 tab(s) orally every 8 hours, As Needed -pain/ back (12 Sep 2018 21:53)  Bacid (LAC) oral tablet: 1 tab(s) orally 2 times a day( for ten days) (12 Sep 2018 21:53)  Betadine 10% topical solution: Apply topically to affected area once a day(to left foread) (12 Sep 2018 21:53)  Cranberry oral tablet: 450 milligram(s) orally 2 times a day (12 Sep 2018 21:53)  docusate sodium 100 mg oral capsule: 2 cap(s) orally once a day (at bedtime) (12 Sep 2018 21:53)  ferrous sulfate 300 mg/5 mL (60 mg elemental iron) oral liquid: 60 milligram(s) orally 2 times a day (12 Sep 2018 21:53)  ipratropium-albuterol 0.5 mg-2.5 mg/3 mLinhalation solution: 1 dose(s) inhaled every 12 hours (12 Sep 2018 21:53)  Lasix 20 mg oral tablet: 1 tab(s) orally once a day (12 Sep 2018 21:53)  Levaquin 500 mg oral tablet: 1 tab(s) orally every 24 hours @8PM for five days started 9/10/18 (12 Sep 2018 21:53)  levothyroxine: 75 milligram(s) orally once a day (12 Sep 2018 21:53)  Multiple Vitamins with Minerals oral tablet: 1 tab(s) orally once a day (12 Sep 2018 21:53)  RABEprazole 20 mg oral tablet, extended release: 1 tab(s) orally 3 times a week ( SAT@9AM) (12 Sep 2018 21:53)  Santyl: Apply topically to affected area once a day(to left buttock) (12 Sep 2018 21:53)  Vitamin B12 100 mcg oral tablet: 1 tab(s) orally once a day (12 Sep 2018 21:53)  Vitamin D3 1000 intl units oral tablet: 1 tab(s) orally once a day (12 Sep 2018 21:53)  zinc sulfate 220 mg oral capsule: 1 cap(s) orally once a day (12 Sep 2018 21:53)    Medications:   acetaminophen   Tablet .. 650 milliGRAM(s) Oral every 6 hours PRN  docusate sodium 100 milliGRAM(s) Oral three times a day  enoxaparin Injectable 30 milliGRAM(s) SubCutaneous daily  levothyroxine 75 MICROGram(s) Oral daily  metoprolol succinate ER 25 milliGRAM(s) Oral daily  pantoprazole  Injectable 40 milliGRAM(s) IV Push daily  piperacillin/tazobactam IVPB. 3.375 Gram(s) IV Intermittent every 8 hours  sodium chloride 0.9%. 1000 milliLiter(s) IV Continuous <Continuous>  vancomycin  IVPB 1000 milliGRAM(s) IV Intermittent every 24 hours      LABS:                        6.7    11.82 )-----------( 398      ( 13 Sep 2018 09:16 )             20.7         138  |  106  |  33<H>  ----------------------------<  142<H>  4.2   |  23  |  1.30    Ca    7.7<L>      12 Sep 2018 13:06    TPro  5.0<L>  /  Alb  1.4<L>  /  TBili  0.4  /  DBili  x   /  AST  18  /  ALT  20  /  AlkPhos  104  12    PT/INR - ( 12 Sep 2018 11:47 )   PT: 13.3 sec;   INR: 1.22 ratio         PTT - ( 12 Sep 2018 11:47 )  PTT:26.9 sec   @ 11:47  INR 1.22    Urinalysis Basic - ( 12 Sep 2018 11:08 )    Color: Yellow / Appearance: Slightly Turbid / S.005 / pH: x  Gluc: x / Ketone: Negative  / Bili: Negative / Urobili: Negative   Blood: x / Protein: 25 mg/dL / Nitrite: Positive   Leuk Esterase: Moderate / RBC: 6-10 /HPF / WBC 26-50   Sq Epi: x / Non Sq Epi: Few / Bacteria: Many        CAPILLARY BLOOD GLUCOSE      < from: CT Pelvis Bony Only w/ IV Cont (18 @ 19:56) >    EXAM:  CT PELVIS BONY ONLY IC                            PROCEDURE DATE:  2018          INTERPRETATION:  History: Left gluteal infection. Rule out deep space   infection.    Spiral CT scanning of the pelvis is performed in the axial plane with IV   contrast. Coronal and sagittal reconstructions were performed. Patient   was unable to lift the arms above the head for the study. Comparison is   made with pertinent images for the CAT scan of the abdomen and pelvis of   2018. 100 cc of Omnipaque 350 were administered however due to   patient's inability to cooperate there was leakage from the IV.    The bony structures demonstrate osteopenia with degenerative changes of   the spine. There is no evidence of bony destruction. There is no evidence   of fracture.    Soft tissues demonstrate atherosclerotic calcification of the abdominal   aorta and its branches. There is no free pelvic fluid. There is air in   the bladder. Please correlate for recent instrumentation.    There is stranding in the subcutaneous soft tissues of the left buttocks   with more focal area of fluid collection in the medial inferior left   buttock measuring approximately 7.0 x 3.0 x 7.5 cm.      Impression:  Inflammatory changes in the left buttock with subcutaneous soft tissue   stranding and more focal fluid collection medially and inferiorly. There   is no associated bony destruction. Process does not extend into   peritoneal or retroperitoneal structures.              < end of copied text >          RECENT CULTURES:
pt  seen on rounds  remains  confused vss afebrile  buttocks  dressing  intact  lungs  clear  cor  irregular rate abdomen  soft  extremities no  edema forehead lac  healing
pt  seen on rounds remains  confused lungs clear   cor  regular rate  abdomen  soft  extremities no  edema  dressing  intact oin  buttocks
pt seen  no complaints  ICU Vital Signs Last 24 Hrs  T(C): 36.8 (18 Sep 2018 05:05), Max: 36.8 (18 Sep 2018 05:05)  T(F): 98.2 (18 Sep 2018 05:05), Max: 98.2 (18 Sep 2018 05:05)  HR: 85 (18 Sep 2018 05:05) (85 - 98)  BP: 121/76 (18 Sep 2018 05:05) (121/76 - 148/90)  BP(mean): --  ABP: --  ABP(mean): --  RR: 18 (18 Sep 2018 05:05) (16 - 18)  SpO2: 93% (18 Sep 2018 05:05) (93% - 97%)  gen-NAD  L scalp, fibrinous exudate to wound  resp-clear b/l  [facility]-reg rate  back-wound clean
pt seen  no issues  ICU Vital Signs Last 24 Hrs  T(C): 36.4 (17 Sep 2018 04:54), Max: 36.5 (16 Sep 2018 21:16)  T(F): 97.5 (17 Sep 2018 04:54), Max: 97.7 (16 Sep 2018 21:16)  HR: 95 (17 Sep 2018 04:54) (95 - 117)  BP: 139/88 (17 Sep 2018 04:54) (122/76 - 143/84)  BP(mean): --  ABP: --  ABP(mean): --  RR: 17 (17 Sep 2018 04:54) (16 - 17)  SpO2: 95% (17 Sep 2018 04:54) (94% - 95%)  gen-NAD  forehead wound, clean, no pus  resp-clear b/l  abd-soft NT/ND  back wound-clean, no pus, erythema resolved                          11.8   8.57  )-----------( 593      ( 17 Sep 2018 08:57 )             37.0
pt seen  no issues overnight  ICU Vital Signs Last 24 Hrs  T(C): 36.4 (15 Sep 2018 04:35), Max: 36.4 (15 Sep 2018 04:35)  T(F): 97.6 (15 Sep 2018 04:35), Max: 97.6 (15 Sep 2018 04:35)  HR: 75 (15 Sep 2018 04:35) (75 - 89)  BP: 147/75 (15 Sep 2018 04:35) (117/63 - 147/75)  BP(mean): --  ABP: --  ABP(mean): --  RR: 18 (15 Sep 2018 04:35) (15 - 18)  SpO2: 95% (15 Sep 2018 04:35) (93% - 95%)  gen-NAD  resp-clear  buttock, wound clean, erythema resolving                          8.9    6.92  )-----------( 420      ( 15 Sep 2018 06:36 )             27.7
pt seen and examined  no issues overnight  ICU Vital Signs Last 24 Hrs  T(C): 36.3 (14 Sep 2018 05:01), Max: 36.9 (13 Sep 2018 20:10)  T(F): 97.3 (14 Sep 2018 05:01), Max: 98.4 (13 Sep 2018 20:10)  HR: 75 (14 Sep 2018 05:01) (75 - 102)  BP: 122/86 (14 Sep 2018 05:01) (83/60 - 164/78)  BP(mean): --  ABP: --  ABP(mean): --  RR: 18 (14 Sep 2018 05:01) (15 - 25)  SpO2: 97% (14 Sep 2018 05:01) (92% - 100%)  gen-NAD  packing removed, wound, clean                        9.8    9.47  )-----------( 421      ( 14 Sep 2018 08:40 )             30.1   09-14    138  |  107  |  24<H>  ----------------------------<  84  3.8   |  22  |  1.10    Ca    7.8<L>      14 Sep 2018 08:40    TPro  5.0<L>  /  Alb  1.4<L>  /  TBili  0.4  /  DBili  x   /  AST  18  /  ALT  20  /  AlkPhos  104  09-12
pt seen  in chair  pt removed back dressing  ICU Vital Signs Last 24 Hrs  T(C): 36.4 (16 Sep 2018 06:19), Max: 36.6 (15 Sep 2018 21:07)  T(F): 97.6 (16 Sep 2018 06:19), Max: 97.8 (15 Sep 2018 21:07)  HR: 94 (16 Sep 2018 06:19) (81 - 94)  BP: 190/90 (16 Sep 2018 06:41) (148/83 - 195/96)  BP(mean): --  ABP: --  ABP(mean): --  RR: 17 (16 Sep 2018 06:19) (17 - 18)  SpO2: 96% (16 Sep 2018 06:19) (94% - 96%)  gen-NAD  resp-clear  abd-soft NT/ND  forehead wound, stitches removed, pus irrigated, dry dressing applied

## 2018-09-19 NOTE — PROGRESS NOTE ADULT - ASSESSMENT
89 yo s/p i and d back abscess, forehead wound     cont local wound care     cleared for d/c from surgical standpoint

## 2018-09-19 NOTE — PROGRESS NOTE ADULT - PROVIDER SPECIALTY LIST ADULT
Anesthesia
Internal Medicine
Surgery

## 2018-09-19 NOTE — PROGRESS NOTE ADULT - REASON FOR ADMISSION
abscess on buttocks

## 2018-10-24 PROCEDURE — 85018 HEMOGLOBIN: CPT

## 2018-10-24 PROCEDURE — 85027 COMPLETE CBC AUTOMATED: CPT

## 2018-10-24 PROCEDURE — 87040 BLOOD CULTURE FOR BACTERIA: CPT

## 2018-10-24 PROCEDURE — 97161 PT EVAL LOW COMPLEX 20 MIN: CPT

## 2018-10-24 PROCEDURE — 87581 M.PNEUMON DNA AMP PROBE: CPT

## 2018-10-24 PROCEDURE — 36415 COLL VENOUS BLD VENIPUNCTURE: CPT

## 2018-10-24 PROCEDURE — 85014 HEMATOCRIT: CPT

## 2018-10-24 PROCEDURE — 85730 THROMBOPLASTIN TIME PARTIAL: CPT

## 2018-10-24 PROCEDURE — 93306 TTE W/DOPPLER COMPLETE: CPT

## 2018-10-24 PROCEDURE — 87070 CULTURE OTHR SPECIMN AEROBIC: CPT

## 2018-10-24 PROCEDURE — 86923 COMPATIBILITY TEST ELECTRIC: CPT

## 2018-10-24 PROCEDURE — 80048 BASIC METABOLIC PNL TOTAL CA: CPT

## 2018-10-24 PROCEDURE — 87086 URINE CULTURE/COLONY COUNT: CPT

## 2018-10-24 PROCEDURE — 74176 CT ABD & PELVIS W/O CONTRAST: CPT

## 2018-10-24 PROCEDURE — 71045 X-RAY EXAM CHEST 1 VIEW: CPT

## 2018-10-24 PROCEDURE — 99285 EMERGENCY DEPT VISIT HI MDM: CPT | Mod: 25

## 2018-10-24 PROCEDURE — 96367 TX/PROPH/DG ADDL SEQ IV INF: CPT

## 2018-10-24 PROCEDURE — 86900 BLOOD TYPING SEROLOGIC ABO: CPT

## 2018-10-24 PROCEDURE — 97116 GAIT TRAINING THERAPY: CPT

## 2018-10-24 PROCEDURE — 93005 ELECTROCARDIOGRAM TRACING: CPT

## 2018-10-24 PROCEDURE — 96366 THER/PROPH/DIAG IV INF ADDON: CPT

## 2018-10-24 PROCEDURE — 86901 BLOOD TYPING SEROLOGIC RH(D): CPT

## 2018-10-24 PROCEDURE — 85652 RBC SED RATE AUTOMATED: CPT

## 2018-10-24 PROCEDURE — P9016: CPT

## 2018-10-24 PROCEDURE — 87150 DNA/RNA AMPLIFIED PROBE: CPT

## 2018-10-24 PROCEDURE — 87633 RESP VIRUS 12-25 TARGETS: CPT

## 2018-10-24 PROCEDURE — 96376 TX/PRO/DX INJ SAME DRUG ADON: CPT

## 2018-10-24 PROCEDURE — 96365 THER/PROPH/DIAG IV INF INIT: CPT

## 2018-10-24 PROCEDURE — 97110 THERAPEUTIC EXERCISES: CPT

## 2018-10-24 PROCEDURE — 36430 TRANSFUSION BLD/BLD COMPNT: CPT

## 2018-10-24 PROCEDURE — 83605 ASSAY OF LACTIC ACID: CPT

## 2018-10-24 PROCEDURE — 81001 URINALYSIS AUTO W/SCOPE: CPT

## 2018-10-24 PROCEDURE — 97530 THERAPEUTIC ACTIVITIES: CPT

## 2018-10-24 PROCEDURE — 83735 ASSAY OF MAGNESIUM: CPT

## 2018-10-24 PROCEDURE — 86850 RBC ANTIBODY SCREEN: CPT

## 2018-10-24 PROCEDURE — 87186 SC STD MICRODIL/AGAR DIL: CPT

## 2018-10-24 PROCEDURE — 80053 COMPREHEN METABOLIC PANEL: CPT

## 2018-10-24 PROCEDURE — 96372 THER/PROPH/DIAG INJ SC/IM: CPT | Mod: XU

## 2018-10-24 PROCEDURE — 83690 ASSAY OF LIPASE: CPT

## 2018-10-24 PROCEDURE — 97162 PT EVAL MOD COMPLEX 30 MIN: CPT

## 2018-10-24 PROCEDURE — 96374 THER/PROPH/DIAG INJ IV PUSH: CPT | Mod: XU

## 2018-10-24 PROCEDURE — 96374 THER/PROPH/DIAG INJ IV PUSH: CPT

## 2018-10-24 PROCEDURE — 86140 C-REACTIVE PROTEIN: CPT

## 2018-10-24 PROCEDURE — 84443 ASSAY THYROID STIM HORMONE: CPT

## 2018-10-24 PROCEDURE — 87798 DETECT AGENT NOS DNA AMP: CPT

## 2018-10-24 PROCEDURE — 85610 PROTHROMBIN TIME: CPT

## 2018-10-24 PROCEDURE — 96375 TX/PRO/DX INJ NEW DRUG ADDON: CPT

## 2018-10-24 PROCEDURE — 72193 CT PELVIS W/DYE: CPT

## 2018-10-24 PROCEDURE — 83550 IRON BINDING TEST: CPT

## 2018-10-24 PROCEDURE — 82728 ASSAY OF FERRITIN: CPT

## 2018-10-24 PROCEDURE — 87486 CHLMYD PNEUM DNA AMP PROBE: CPT

## 2018-10-24 PROCEDURE — 70450 CT HEAD/BRAIN W/O DYE: CPT

## 2018-10-24 PROCEDURE — 82272 OCCULT BLD FECES 1-3 TESTS: CPT

## 2018-10-25 ENCOUNTER — RX RENEWAL (OUTPATIENT)
Age: 83
End: 2018-10-25

## 2019-03-04 PROBLEM — R41.3 MEMORY LOSS: Status: ACTIVE | Noted: 2017-07-06

## 2019-04-10 NOTE — DISCHARGE NOTE ADULT - NS AS ACTIVITY OBS
Bed: [id number]  Expected date:   Expected time:   Means of arrival:   Comments:  #5   No Heavy lifting/straining/Do not drive or operate machinery/Do not make important decisions

## 2020-03-26 NOTE — CONSULT NOTE ADULT - PROBLEM SELECTOR RECOMMENDATION 2
Patient Education     Prednisone tablets  Brand Names: Deltasone, Predone, Sterapred, Sterapred DS  What is this medicine?  PREDNISONE (PRED ni sone) is a corticosteroid. It is commonly used to treat inflammation of the skin, joints, lungs, and other organs. Common conditions treated include asthma, allergies, and arthritis. It is also used for other conditions, such as blood disorders and diseases of the adrenal glands.  How should I use this medicine?  Take this medicine by mouth with a glass of water. Follow the directions on the prescription label. Take this medicine with food. If you are taking this medicine once a day, take it in the morning. Do not take more medicine than you are told to take. Do not suddenly stop taking your medicine because you may develop a severe reaction. Your doctor will tell you how much medicine to take. If your doctor wants you to stop the medicine, the dose may be slowly lowered over time to avoid any side effects.  Talk to your pediatrician regarding the use of this medicine in children. Special care may be needed.  What side effects may I notice from receiving this medicine?  Side effects that you should report to your doctor or health care professional as soon as possible:  · allergic reactions like skin rash, itching or hives, swelling of the face, lips, or tongue  · changes in emotions or moods  · changes in vision  · depressed mood  · eye pain  · fever or chills, cough, sore throat, pain or difficulty passing urine  · increased thirst  · swelling of ankles, feet  Side effects that usually do not require medical attention (report to your doctor or health care professional if they continue or are bothersome):  · confusion, excitement, restlessness  · headache  · nausea, vomiting  · skin problems, acne, thin and shiny skin  · trouble sleeping  · weight gain  What may interact with this medicine?  Do not take this medicine with any of the following  medications:  · metyrapone  · mifepristone  This medicine may also interact with the following medications:  · aminoglutethimide  · amphotericin B  · aspirin and aspirin-like medicines  · barbiturates  · certain medicines for diabetes, like glipizide or glyburide  · cholestyramine  · cholinesterase inhibitors  · cyclosporine  · digoxin  · diuretics  · ephedrine  · female hormones, like estrogens and birth control pills  · isoniazid  · ketoconazole  · NSAIDS, medicines for pain and inflammation, like ibuprofen or naproxen  · phenytoin  · rifampin  · toxoids  · vaccines  · warfarin  What if I miss a dose?  If you miss a dose, take it as soon as you can. If it is almost time for your next dose, talk to your doctor or health care professional. You may need to miss a dose or take an extra dose. Do not take double or extra doses without advice.  Where should I keep my medicine?  Keep out of the reach of children.  Store at room temperature between 15 and 30 degrees C (59 and 86 degrees F). Protect from light. Keep container tightly closed. Throw away any unused medicine after the expiration date.  What should I tell my health care provider before I take this medicine?  They need to know if you have any of these conditions:  · Cushing's syndrome  · diabetes  · glaucoma  · heart disease  · high blood pressure  · infection (especially a virus infection such as chickenpox, cold sores, or herpes)  · kidney disease  · liver disease  · mental illness  · myasthenia gravis  · osteoporosis  · seizures  · stomach or intestine problems  · thyroid disease  · an unusual or allergic reaction to lactose, prednisone, other medicines, foods, dyes, or preservatives  · pregnant or trying to get pregnant  · breast-feeding  What should I watch for while using this medicine?  Visit your doctor or health care professional for regular checks on your progress. If you are taking this medicine over a prolonged period, carry an identification card  with your name and address, the type and dose of your medicine, and your doctor's name and address.  This medicine may increase your risk of getting an infection. Tell your doctor or health care professional if you are around anyone with measles or chickenpox, or if you develop sores or blisters that do not heal properly.  If you are going to have surgery, tell your doctor or health care professional that you have taken this medicine within the last twelve months.  Ask your doctor or health care professional about your diet. You may need to lower the amount of salt you eat.  This medicine may affect blood sugar levels. If you have diabetes, check with your doctor or health care professional before you change your diet or the dose of your diabetic medicine.  NOTE:This sheet is a summary. It may not cover all possible information. If you have questions about this medicine, talk to your doctor, pharmacist, or health care provider. Copyright© 2018 [employer]            further recs to follow, repeat blood cultures ordered.

## 2020-04-26 NOTE — ED ADULT TRIAGE NOTE - HEIGHT IN CM
04/25/20 2100   Sleep/Rest/Relaxation   Day/Evening Time Hours up all shift   Behavioral Health   Hallucinations denies / not responding to hallucinations   Thinking intact   Orientation person: oriented;place: oriented   Memory baseline memory   Insight insight appropriate to situation;insight appropriate to events   Judgement intact   Eye Contact at examiner;at floor   Affect full range affect;blunted, flat   Mood mood is calm   Physical Appearance/Attire appears stated age;attire appropriate to age and situation   Hygiene well groomed   1. Wish to be Dead (Recent) No   2. Non-Specific Active Suicidal Thoughts (Recent) No   Self Injury   (Denies)   Elopement   (None stated or observed)   Activity   (Active)   Speech coherent;clear   Medication Sensitivity no observed side effects;no stated side effects   Psychomotor / Gait balanced;steady   Activities of Daily Living   Hygiene/Grooming independent   Oral Hygiene independent   Dress scrubs (behavioral health)   Laundry unable to complete   Room Organization independent     Patient had a quiet but positive shift.    Patient did not require seclusion/restraints to manage behavior.    Bert Mari did participate in groups and was visible in the milieu.    Notable mental health symptoms during this shift:No notable mental health symptoms    Patient is working on these coping/social skills: Sharing feelings    Visitors during this shift included N/A.  Overall, the visit was N/A.  Significant events during the visit included N/A.    Other information about this shift: Pt had a quiet but positive shift this evening. Pt participated in all of the groups this evening but was often pretty quiet and did not initiate conversation with peers or staff. Pt seemed to enjoy the movie this evening and ate well. Pt denies SI/SIB and hallucinations. Pt rated depression 0/10 and anxiety 1/10. Pt seemed to think about the questions posed by writer but answered appropriately  "after a few seconds. Pt said when she first got here she \"fell asleep around 1 am\" but said she's been sleeping better. Pt has no other concerns at this time.  " 154.94

## 2020-05-15 NOTE — PATIENT PROFILE ADULT. - NS PRO OT REFERRAL QUES 2 YN
Oracio Flores  PGY1 Internal Medicine  [phone] / [phone]    Patient is a 81y old  Female who presents with a chief complaint of Shortness of breath, fevers (14 May 2020 08:46)      SUBJECTIVE / OVERNIGHT EVENTS: ON patient reports being cold, denies fevers. Denies LE pain/spasms as in other nights. SOB with coughing, activity. Patient reports generally feeling unwell. Endorses back soreness, does not radiate, no new leg weakness.       REVIEW OF SYSTEMS:    negative unless noted above    MEDICATIONS  (STANDING):  atorvastatin 20 milliGRAM(s) Oral at bedtime  bisacodyl 5 milliGRAM(s) Oral at bedtime  cholecalciferol 2000 Unit(s) Oral daily  enoxaparin Injectable 40 milliGRAM(s) SubCutaneous daily  levothyroxine 50 MICROGram(s) Oral daily  lidocaine   Patch 2 Patch Transdermal daily  melatonin 5 milliGRAM(s) Oral at bedtime  pantoprazole    Tablet 40 milliGRAM(s) Oral before breakfast  polyethylene glycol 3350 17 Gram(s) Oral daily  predniSONE   Tablet 10 milliGRAM(s) Oral daily  pyridostigmine 60 milliGRAM(s) Oral three times a day  pyridostigmine  milliGRAM(s) Oral <User Schedule>  senna 2 Tablet(s) Oral at bedtime  zinc oxide 40% Ointment 1 Application(s) Topical daily    MEDICATIONS  (PRN):  acetaminophen   Tablet .. 650 milliGRAM(s) Oral every 4 hours PRN Temp greater or equal to 38C (100.4F), Moderate Pain (4 - 6)  ALBUTerol    90 MICROgram(s) HFA Inhaler 2 Puff(s) Inhalation every 4 hours PRN Shortness of Breath and/or Wheezing  aluminum hydroxide/magnesium hydroxide/simethicone Suspension 30 milliLiter(s) Oral every 6 hours PRN Dyspepsia  benzonatate 100 milliGRAM(s) Oral three times a day PRN Cough  guaiFENesin   Syrup  (Sugar-Free) 100 milliGRAM(s) Oral every 6 hours PRN Cough  sodium chloride 0.65% Nasal 1 Spray(s) Both Nostrils two times a day PRN Nasal Congestion      CAPILLARY BLOOD GLUCOSE        I&O's Summary    14 May 2020 07:01  -  15 May 2020 07:00  --------------------------------------------------------  IN: 240 mL / OUT: 500 mL / NET: -260 mL        PHYSICAL EXAM:  Vital Signs Last 24 Hrs  T(C): 36.6 (15 May 2020 04:26), Max: 37.1 (14 May 2020 14:09)  T(F): 97.8 (15 May 2020 04:26), Max: 98.8 (14 May 2020 14:09)  HR: 59 (15 May 2020 04:26) (59 - 78)  BP: 119/69 (15 May 2020 04:26) (105/58 - 119/69)  BP(mean): --  RR: 19 (15 May 2020 04:26) (18 - 19)  SpO2: 99% (15 May 2020 04:26) (80% - 99%)    PHYSICAL EXAM:  GENERAL: NAD on 4L  CHEST/LUNG: Clear to auscultation bilaterally; No wheeze  HEART: Regular rate and rhythm; No murmurs, rubs, or gallops  ABDOMEN: soft, mild TTP diffusely, nd, neg morrell sign  EXTREMITIES:  no calf tenderness, no swelling  BACK: TTP along thoracic paraspinals  PSYCH: AAOx3  NEUROLOGY: non-focal, neck flexion intact, strength/sensation grossly intact      LABS:                        9.6    4.43  )-----------( 127      ( 15 May 2020 06:34 )             31.6     05-15    140  |  105  |  13  ----------------------------<  88  3.8   |  27  |  0.88    Ca    8.9      15 May 2020 06:34  Phos  3.2     05-15  Mg     1.7     05-15    TPro  5.7<L>  /  Alb  2.7<L>  /  TBili  0.2  /  DBili  x   /  AST  13  /  ALT  12  /  AlkPhos  55  05-15                RADIOLOGY & ADDITIONAL TESTS:  Results Reviewed:   Imaging Personally Reviewed:  Electrocardiogram Personally Reviewed:    COORDINATION OF CARE:  Care Discussed with Consultants/Other Providers [Y/N]:  Prior or Outpatient Records Reviewed [Y/N]: yes

## 2020-09-09 NOTE — DIETITIAN INITIAL EVALUATION ADULT. - PROBLEM SELECTOR PLAN 2
GYNECOLOGIC ONCOLOGY  Dr. Mony Griffin  Chief Complaint   Patient presents with   • Office Visit        FOLLOW-UP NOTE   9/23/2020        PCP: Darlene Perez NP  Referring Provider: Osvaldo Peralta MD    Chief Complaint: Surveillance visit,  Stage 1A Grade 2 endometrial cancer     History of Present Illness:  Cassandra Da Silva is a 61 year old female Cassandra Da Silva is a 61 year old female who presented with 1 month of post menopausal bleeding.  An US was performed which showed:    Uterus measures 9.0 x 5.1 x 4.4 cm. Anteverted/anteflexed configuration.  The myometrium is unremarkable. The endometrial stripe measures 16 mm in  thickness. No endometrial fluid is seen. Multiple nabothian cysts of the  cervix.  Right ovary measures 3.5 x 2.4 x 3.3 cm in size. Doppler imaging  demonstrates normal low resistance blood flow. No masses or abnormal fluid  collections are seen.  Nonvisualization of the left ovary.  Limited views of the urinary bladder are unremarkable. No pelvic free fluid  was demonstrated.    IMPRESSION:  Endometrial thickness of 16 mm, abnormal in the postmenopausal state.  Endometrial biopsy is recommended.  Nonvisualization of the left ovary.    Following Consultation she underwent surgery on  3/9/20 she underwent, Robotic-assisted total laparoscopic hysterectomy, bilateral salpingo-oophorectomy, washings, bilateral sentinel lymph node dissection, cystoscopy.    Final pathology revealed: Stage 1 A grade 2 endometrial cancer.  She presents to the clinic today for her surveillance visit.  She states she is feeling relatively well.  States she is \"back to normal\".  Using the fiber and now feeling like she is \"finally having regular BM\"    She denies vaginal bleeding, vaginal discharge, problems or changes with bowel or bladder function, fever, chills, chest pain, shortness of breath, nausea, vomiting, lower extremity edema or pain. Denies increase in abdominal girth, abdominal bloating, or early satiety.         REVIEW OF SYSTEMS     A 12 point review of systems is performed and reviewed with pertinent findings as noted above.      PAST MEDICAL, SURGICAL, FAMILY AND SOCIAL HISTORY     Past Medical History:   Diagnosis Date   • Essential (primary) hypertension    • Essential hypertension 2016   • Malignant neoplasm (CMS/HCC) 2020    endometrial.  coming for surgery       Past Surgical History:   Procedure Laterality Date   • Colonoscopy  2016   • Robotic assisted hysterectomy  2020    RA [facility] BSO sentinel nodes per Elias       Family History   Problem Relation Age of Onset   • Cancer Mother         breast   • Osteoporosis Mother    • Psychiatric Maternal Aunt         bipolar   • Substance abuse Maternal Aunt         alcohol   • Dementia/Alzheimers Maternal Aunt    • Dementia/Alzheimers Maternal Aunt        Social History     Socioeconomic History   • Marital status:      Spouse name: Not on file   • Number of children: Not on file   • Years of education: Not on file   • Highest education level: Not on file   Occupational History   • Not on file   Social Needs   • Financial resource strain: Not on file   • Food insecurity     Worry: Not on file     Inability: Not on file   • Transportation needs     Medical: Not on file     Non-medical: Not on file   Tobacco Use   • Smoking status: Former Smoker     Packs/day: 1.00     Types: Cigarettes     Start date: 1/10/1972     Quit date: 1984     Years since quittin.1   • Smokeless tobacco: Never Used   Substance and Sexual Activity   • Alcohol use: Yes     Alcohol/week: 2.0 standard drinks     Types: 1 Glasses of wine, 1 Cans of beer per week     Frequency: 2-3 times a week     Drinks per session: 1 or 2     Binge frequency: Never     Comment: 1-2   • Drug use: No   • Sexual activity: Not on file   Lifestyle   • Physical activity     Days per week: Not on file     Minutes per session: Not on file   • Stress: Not on file   Relationships   • Social  connections     Talks on phone: Not on file     Gets together: Not on file     Attends Hinduism service: Not on file     Active member of club or organization: Not on file     Attends meetings of clubs or organizations: Not on file     Relationship status: Not on file   • Intimate partner violence     Fear of current or ex partner: Not on file     Emotionally abused: Not on file     Physically abused: Not on file     Forced sexual activity: Not on file   Other Topics Concern   • Not on file   Social History Narrative   • Not on file       MEDICATIONS AND ALLERGIES     Current Medications    ASPIRIN 81 MG TABLET    Take 81 mg by mouth daily.    BETA CAROTENE (VITAMIN A) 43842 UNIT CAPSULE    Take 25,000 Units by mouth daily.    CALCIUM CARBONATE-VITAMIN D (CALTRATE+D) 600-400 MG-UNIT PER TABLET    Take 1 tablet by mouth daily.    CALCIUM POLYCARBOPHIL (FIBERCON PO)    Take by mouth 2 times daily.    FLAXSEED, LINSEED, (FLAX SEED OIL PO)    Take 1 capsule by mouth nightly.     GLUCOSAMINE-CHONDROITIN (GLUCOSAMINE CHONDR COMPLEX PO)    Take 1 tablet by mouth daily.    HYDROCODONE-ACETAMINOPHEN (NORCO) 5-325 MG PER TABLET    Take 1 tablet by mouth every 6 hours as needed for Pain.    LISINOPRIL-HYDROCHLOROTHIAZIDE (ZESTORETIC) 20-25 MG PER TABLET    Take 1 tablet by mouth daily.    MULTIPLE VITAMIN (MULTI-VITAMIN DAILY PO)    Take 1 tablet by mouth daily.     NAPROXEN SODIUM (ALEVE) 220 MG TABLET    Take 220 mg by mouth every morning.     NON FORMULARY    Take 1 each by mouth nightly. Cholest Off    OMEGA-3 FATTY ACIDS (FISH OIL PO)    Take 1 capsule by mouth daily.     VITAMIN MIXTURE (OLENA-C PO)           ALLERGIES:   Allergen Reactions   • Penicillins RASH   • Sulfa Antibiotics RASH   • Atorvastatin ARTHRALGIA     Knee joints only       ECOG PERFORMANCE STATUS     Patient's ECOG Performance Status is 1; restricted in physically strenuous activity but ambulatory and able to carry our work of a light sedentary  nature, i.e: light house work, office work.    PHYSICAL EXAM     Vitals:    09/23/20 1338   BP: 118/84   Pulse: 80   Weight: 127.9 kg     Body mass index is 48.41 kg/m².    Constitutional: she appears well-developed and well-nourished, no apparent distress.   HEENT: normocephalic and atraumatic, no masses, lesions or abnormalities, conjunctivae, eye lids and EOMs are normal.   Neck: normal range of motion with no tracheal deviation or asymmetry, thyroid is not enlarged.  Lungs: clear to auscultation bilaterally and normal respiratory effort.  Neurologic: Normal.  Musculoskeletal: coordination and gait normal, appropriate muscle strength and tone.  Skin: skin is warm and dry, no rash or skin lesions noted, she is not diaphoretic.  Psychiatric: she is alert and oriented to person, place, and time, she has a normal mood and affect, her behavior is normal, judgment and thought content normal.  Gyn:see below    Nursing note and vitals reviewed.    ASSESSMENT AND PLAN   Cassandra Da Silva is a 61 year old female  Following Consultation she underwent surgery on  3/9/20 she underwent, Robotic-assisted total laparoscopic hysterectomy, bilateral salpingo-oophorectomy, washings, bilateral sentinel lymph node dissection, cystoscopy.    Final pathology revealed: Stage 1 A grade 2 endometrial cancer.  She presents to the clinic today for her surveillance visit.  She states she is feeling relatively well.  I have discussed her pathology in great detail.  We discussed surveillance as well to include q.3 months pelvic exam x2 years followed by q.6 months pelvic exam x3 years an annual.    No evidence of disease noted on today's exam.  She will return to the clinic in 3 months time, sooner should she have any questions concerns or issues.    At the end of the visit the patient stated an understanding and agreement with the above stated plan of care. All of her questions were answered to her satisfaction. I've encouraged her to call the  office with any further issues, questions, or concerns.    Osvaldo Peralta MD, thank you so much for allowing me the opportunity to assist you in the care of this chet lady. Please contact me if you have any questions or concerns. I look forward to being of further service to you and will continue to keep you informed of any and all subsequent developments in her care.      Patient seen and evaluated along with the advanced practice clinician (APC).  I have participated in the care of the patient.  I agree with the above assessment and plan.      Ms. Da Silva presents today for surveillance.  Feeling very well.  Happy with bowel movements now    Physical exam:    Visit Vitals  /84   Pulse 80   Wt 127.9 kg   BMI 48.41 kg/m²       General Appearance:     alert, appears stated age, cooperative and no distress  Head:   Normocephalic, without obvious abnormality, atraumatic  Abdomen:  Obese,  soft, non-tender; bowel sounds normal; no palpable masses,  no organomegaly  Extremities:   extremities normal, atraumatic, no cyanosis or edema  Pulses:   2+ and symmetric  Neurologic:   Alert and oriented x3  GYN: External genitalia show no lesions.  Urethral meatus shows no polyps.  Urethra and bladder are well supported with no cystocele.  The vagina shows no lesions and none are palpable.  Cervix, uterus, adnexa, and parametria are surgically absent.  Vaginal cuff is healed well. The anus and perineum show no lesions.      All questions answered.  Support given.  Continue with surveillance plan    Mony Griffin MD.  [facility]     [address] [place] [address]  Phone: [phone]  Pager:  [phone]         monitor

## 2020-12-15 PROBLEM — Z87.440 HISTORY OF URINARY TRACT INFECTION: Status: RESOLVED | Noted: 2017-05-04 | Resolved: 2020-12-15

## 2021-02-22 NOTE — PATIENT PROFILE ADULT. - NSNUTRITIONRISK_GI_A_CORE
Received referral per Dr. Eric Willson    Dx: Stress Incontinence    called and left vm for patient to call the office at [phone]. We were following up on a referral received in our office. No indicators present

## 2021-06-28 NOTE — PROGRESS NOTE ADULT - PROBLEM SELECTOR PLAN 6
Spoke with daughter. If echo can be done at hospital today that is great. If it can't we can reschedule fu with echo same day as an o/p. Echo was just for routine AS surveillance. Will wait to see if echo will be done today. metoprolol

## 2022-03-08 NOTE — PATIENT PROFILE ADULT. - RESOURCE ASSISTANCE NEEDED TO OBTAIN FOOD
done
unable to ascertain this info from transfer packet sent with pt from [facility],pt confused.

## 2022-05-17 NOTE — PATIENT PROFILE ADULT. - AS SC BRADEN SENSORY
Found patient on cpap mode with resp rate of 8 and VE of 5, switched back to previous AC settings at this time.  
RAPID RESPONSE NURSE ROUND       Rounding completed with charge RN, Isabell. No concerns verbalized at this time. Instructed to call [phone] for further concerns or assistance.         
(2) very limited

## 2023-12-21 NOTE — CHART NOTE - NSCHARTNOTEFT_GEN_A_CORE
Patient states that she takes "blue pill" likely omeprazole for her GERD once in the morning and once at night. Started on protonix daily at this time. In no acute distress. (3) no apparent problem

## 2024-01-15 NOTE — PROGRESS NOTE ADULT - PROBLEM SELECTOR PLAN 3
Received a call from Naren GRIFFITH nurse stating that Dr. Walsh cancelled Venkata and now is ordering calcium acetate 667 mg with instructions to take 2 tablets with each meal TID and 1 tablet with snacks. Max 8 tablets daily. Script sent to pharmacy.    monitor ha a fib

## 2024-08-29 NOTE — PATIENT PROFILE ADULT. - HAS THE PATIENT HAD A SIGNIFICANT CHANGE IN FUNCTIONAL STATUS DUE TO CVA, HEAD TRAUMA, ORTHOPEDIC TRAUMA/SURGERY, OR FALL, WITH THE WEEK PRIOR TO ADMISSION
mg at 08/24/24 2238    [Held by provider] clopidogrel (PLAVIX) tablet 75 mg  75 mg Oral Daily Anson Rick MD   75 mg at 08/25/24 0935    nicotine (NICODERM CQ) 21 MG/24HR 1 patch  1 patch TransDERmal Daily Anson Rick MD        sodium chloride flush 0.9 % injection 5-40 mL  5-40 mL IntraVENous 2 times per day Anson Rick MD   10 mL at 08/28/24 2044    sodium chloride flush 0.9 % injection 5-40 mL  5-40 mL IntraVENous PRN Anson Rick MD        0.9 % sodium chloride infusion   IntraVENous PRN Anson Rick MD        ondansetron (ZOFRAN-ODT) disintegrating tablet 4 mg  4 mg Oral Q8H PRN Anson Rick MD        Or    ondansetron (ZOFRAN) injection 4 mg  4 mg IntraVENous Q6H PRN Anson Rick MD        polyethylene glycol (GLYCOLAX) packet 17 g  17 g Oral Daily PRN Anson Rick MD        [Held by provider] enoxaparin (LOVENOX) injection 40 mg  40 mg SubCUTAneous Daily Anson Rick MD   40 mg at 08/24/24 0924    [Held by provider] aspirin tablet 325 mg  325 mg Oral Daily Anson Rick MD   325 mg at 08/25/24 0935    atorvastatin (LIPITOR) tablet 80 mg  80 mg Oral Nightly Anson Rick MD   80 mg at 08/28/24 2043    labetalol (NORMODYNE;TRANDATE) injection 10 mg  10 mg IntraVENous Q10 Min PRN Anson Rick MD           Allergies:    Allergies   Allergen Reactions    Penicillins Anaphylaxis       Problem List:    Patient Active Problem List   Diagnosis Code    Malignant hypertension I10    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    Cerebrovascular accident (CVA) due to stenosis of right middle cerebral artery (HCC) I63.511    Hypertension I10    Hyperlipidemia E78.5    Tobacco use Z72.0    CKD (chronic kidney disease), stage III (HCC) N18.30    Dizziness R42       Past Medical History:        Diagnosis Date    Brain aneurysm     Hypertension        Past Surgical History:        Procedure Laterality Date    ENDOVASCULAR REPAIR PERIPHERAL ANEURYSM         Social History:     Social History     Tobacco Use    Smoking status: Every Day     Types: Cigarettes    Smokeless tobacco: Never   Substance Use Topics    Alcohol use: Yes     Comment: occasionally                                Ready to quit: Not Answered  Counseling given: Not Answered      Vital Signs (Current):   Vitals:    08/28/24 1916 08/28/24 2319 08/29/24 0615 08/29/24 0720   BP: (!) 140/66 (!) 150/68  (!) 140/83   Pulse: 80 63  70   Resp: 18 17  17   Temp: 98.1 °F (36.7 °C) 98.1 °F (36.7 °C)  98.1 °F (36.7 °C)   TempSrc: Oral Oral  Oral   SpO2: 100% 100%  98%   Weight:   84.4 kg (186 lb)    Height:                                                  BP Readings from Last 3 Encounters:   08/29/24 (!) 140/83   02/20/24 134/69   10/02/23 (!) 191/74       NPO Status:                                                                                 BMI:   Wt Readings from Last 3 Encounters:   08/29/24 84.4 kg (186 lb)   02/15/24 72.6 kg (160 lb)   10/02/23 83.9 kg (185 lb)     Body mass index is 29.13 kg/m².    CBC:   Lab Results   Component Value Date/Time    WBC 10.0 08/29/2024 10:55 AM    RBC 3.13 08/29/2024 10:55 AM    HGB 9.3 08/29/2024 10:55 AM    HCT 28.6 08/29/2024 10:55 AM    MCV 91.4 08/29/2024 10:55 AM    RDW 16.9 08/29/2024 10:55 AM     08/29/2024 10:55 AM       CMP:   Lab Results   Component Value Date/Time     08/29/2024 10:55 AM    K 4.0 08/29/2024 10:55 AM     08/29/2024 10:55 AM    CO2 25 08/29/2024 10:55 AM    BUN 14 08/29/2024 10:55 AM    CREATININE 1.14 08/29/2024 10:55 AM    LABGLOM 53 08/29/2024 10:55 AM    LABGLOM 52 02/20/2024 04:57 AM    GLUCOSE 125 08/29/2024 10:55 AM    CALCIUM 9.0 08/29/2024 10:55 AM    BILITOT 0.4 08/29/2024 10:55 AM    ALKPHOS 70 08/29/2024 10:55 AM    AST 15 08/29/2024 10:55 AM    ALT 27 08/29/2024 10:55 AM       POC Tests:   Recent Labs     08/26/24 2007   POCGLU 187*       Coags:   Lab Results   Component Value Date/Time    PROTIME 14.2 08/24/2024 12:35 AM    INR  no

## 2025-03-11 NOTE — PATIENT PROFILE ADULT. - NS MD HP INPLANTS MED DEV
no
unable to ascertain this info from transfer packet sent with pt from [facility],pt confused.